# Patient Record
Sex: FEMALE | Race: BLACK OR AFRICAN AMERICAN | NOT HISPANIC OR LATINO | URBAN - METROPOLITAN AREA
[De-identification: names, ages, dates, MRNs, and addresses within clinical notes are randomized per-mention and may not be internally consistent; named-entity substitution may affect disease eponyms.]

---

## 2019-01-19 ENCOUNTER — INPATIENT (INPATIENT)
Facility: HOSPITAL | Age: 54
LOS: 25 days | Discharge: NURSING, REHAB OR LONG-TERM CARE | DRG: 674 | End: 2019-02-14
Attending: INTERNAL MEDICINE | Admitting: INTERNAL MEDICINE
Payer: MEDICARE

## 2019-01-19 VITALS
HEART RATE: 72 BPM | OXYGEN SATURATION: 99 % | DIASTOLIC BLOOD PRESSURE: 70 MMHG | HEIGHT: 63 IN | TEMPERATURE: 97 F | SYSTOLIC BLOOD PRESSURE: 121 MMHG | WEIGHT: 154.98 LBS | RESPIRATION RATE: 18 BRPM

## 2019-01-19 DIAGNOSIS — N17.9 ACUTE KIDNEY FAILURE, UNSPECIFIED: ICD-10-CM

## 2019-01-19 LAB
ALBUMIN SERPL ELPH-MCNC: 4 G/DL — SIGNIFICANT CHANGE UP (ref 3.5–5)
ALP SERPL-CCNC: 110 U/L — SIGNIFICANT CHANGE UP (ref 40–120)
ALT FLD-CCNC: 28 U/L DA — SIGNIFICANT CHANGE UP (ref 10–60)
ANION GAP SERPL CALC-SCNC: 13 MMOL/L — SIGNIFICANT CHANGE UP (ref 5–17)
ANION GAP SERPL CALC-SCNC: 14 MMOL/L — SIGNIFICANT CHANGE UP (ref 5–17)
APPEARANCE UR: CLEAR — SIGNIFICANT CHANGE UP
AST SERPL-CCNC: 19 U/L — SIGNIFICANT CHANGE UP (ref 10–40)
BACTERIA # UR AUTO: ABNORMAL /HPF
BASOPHILS # BLD AUTO: 0.1 K/UL — SIGNIFICANT CHANGE UP (ref 0–0.2)
BASOPHILS NFR BLD AUTO: 0.9 % — SIGNIFICANT CHANGE UP (ref 0–2)
BILIRUB SERPL-MCNC: 0.3 MG/DL — SIGNIFICANT CHANGE UP (ref 0.2–1.2)
BILIRUB UR-MCNC: NEGATIVE — SIGNIFICANT CHANGE UP
BUN SERPL-MCNC: 58 MG/DL — HIGH (ref 7–18)
BUN SERPL-MCNC: 59 MG/DL — HIGH (ref 7–18)
CALCIUM SERPL-MCNC: 8.9 MG/DL — SIGNIFICANT CHANGE UP (ref 8.4–10.5)
CALCIUM SERPL-MCNC: 9 MG/DL — SIGNIFICANT CHANGE UP (ref 8.4–10.5)
CHLORIDE SERPL-SCNC: 110 MMOL/L — HIGH (ref 96–108)
CHLORIDE SERPL-SCNC: 111 MMOL/L — HIGH (ref 96–108)
CO2 SERPL-SCNC: 15 MMOL/L — LOW (ref 22–31)
CO2 SERPL-SCNC: 18 MMOL/L — LOW (ref 22–31)
COLOR SPEC: YELLOW — SIGNIFICANT CHANGE UP
CREAT ?TM UR-MCNC: 60 MG/DL — SIGNIFICANT CHANGE UP
CREAT SERPL-MCNC: 7.74 MG/DL — HIGH (ref 0.5–1.3)
CREAT SERPL-MCNC: 7.93 MG/DL — HIGH (ref 0.5–1.3)
DIFF PNL FLD: ABNORMAL
EOSINOPHIL # BLD AUTO: 0.1 K/UL — SIGNIFICANT CHANGE UP (ref 0–0.5)
EOSINOPHIL NFR BLD AUTO: 0.9 % — SIGNIFICANT CHANGE UP (ref 0–6)
EPI CELLS # UR: SIGNIFICANT CHANGE UP /HPF
GLUCOSE SERPL-MCNC: 244 MG/DL — HIGH (ref 70–99)
GLUCOSE SERPL-MCNC: 97 MG/DL — SIGNIFICANT CHANGE UP (ref 70–99)
GLUCOSE UR QL: NEGATIVE — SIGNIFICANT CHANGE UP
HCG SERPL-ACNC: <1 MIU/ML — SIGNIFICANT CHANGE UP
HCG UR QL: NEGATIVE — SIGNIFICANT CHANGE UP
HCT VFR BLD CALC: 27.4 % — LOW (ref 34.5–45)
HGB BLD-MCNC: 8.8 G/DL — LOW (ref 11.5–15.5)
HYALINE CASTS # UR AUTO: ABNORMAL /LPF
IRON SATN MFR SERPL: 22 % — SIGNIFICANT CHANGE UP (ref 15–50)
IRON SATN MFR SERPL: 66 UG/DL — SIGNIFICANT CHANGE UP (ref 40–150)
KETONES UR-MCNC: NEGATIVE — SIGNIFICANT CHANGE UP
LDH SERPL L TO P-CCNC: 394 U/L — HIGH (ref 120–225)
LEUKOCYTE ESTERASE UR-ACNC: ABNORMAL
LYMPHOCYTES # BLD AUTO: 1.2 K/UL — SIGNIFICANT CHANGE UP (ref 1–3.3)
LYMPHOCYTES # BLD AUTO: 14.6 % — SIGNIFICANT CHANGE UP (ref 13–44)
MAGNESIUM SERPL-MCNC: 1.4 MG/DL — SIGNIFICANT CHANGE UP (ref 1.6–2.6)
MCHC RBC-ENTMCNC: 27.6 PG — SIGNIFICANT CHANGE UP (ref 27–34)
MCHC RBC-ENTMCNC: 32 GM/DL — SIGNIFICANT CHANGE UP (ref 32–36)
MCV RBC AUTO: 86.1 FL — SIGNIFICANT CHANGE UP (ref 80–100)
MONOCYTES # BLD AUTO: 0.5 K/UL — SIGNIFICANT CHANGE UP (ref 0–0.9)
MONOCYTES NFR BLD AUTO: 6.2 % — SIGNIFICANT CHANGE UP (ref 2–14)
NEUTROPHILS # BLD AUTO: 6.3 K/UL — SIGNIFICANT CHANGE UP (ref 1.8–7.4)
NEUTROPHILS NFR BLD AUTO: 77.4 % — HIGH (ref 43–77)
NITRITE UR-MCNC: NEGATIVE — SIGNIFICANT CHANGE UP
OSMOLALITY UR: 284 MOS/KG — SIGNIFICANT CHANGE UP (ref 50–1200)
PH UR: 5 — SIGNIFICANT CHANGE UP (ref 5–8)
PLATELET # BLD AUTO: 265 K/UL — SIGNIFICANT CHANGE UP (ref 150–400)
POTASSIUM SERPL-MCNC: 4.8 MMOL/L — SIGNIFICANT CHANGE UP (ref 3.5–5.3)
POTASSIUM SERPL-MCNC: 5.5 MMOL/L — HIGH (ref 3.5–5.3)
POTASSIUM SERPL-SCNC: 4.8 MMOL/L — SIGNIFICANT CHANGE UP (ref 3.5–5.3)
POTASSIUM SERPL-SCNC: 5.5 MMOL/L — HIGH (ref 3.5–5.3)
PROT ?TM UR-MCNC: 51 MG/DL — HIGH (ref 0–12)
PROT SERPL-MCNC: 8.3 G/DL — SIGNIFICANT CHANGE UP (ref 6–8.3)
PROT UR-MCNC: 30 MG/DL
RBC # BLD: 3.18 M/UL — LOW (ref 3.8–5.2)
RBC # FLD: 11.7 % — SIGNIFICANT CHANGE UP (ref 10.3–14.5)
RBC CASTS # UR COMP ASSIST: ABNORMAL /HPF (ref 0–2)
SODIUM SERPL-SCNC: 140 MMOL/L — SIGNIFICANT CHANGE UP (ref 135–145)
SODIUM SERPL-SCNC: 141 MMOL/L — SIGNIFICANT CHANGE UP (ref 135–145)
SODIUM UR-SCNC: 52 MMOL/L — SIGNIFICANT CHANGE UP (ref 40–220)
SP GR SPEC: 1.01 — SIGNIFICANT CHANGE UP (ref 1.01–1.02)
TIBC SERPL-MCNC: 300 UG/DL — SIGNIFICANT CHANGE UP (ref 250–450)
TROPONIN I SERPL-MCNC: <0.015 NG/ML — SIGNIFICANT CHANGE UP (ref 0–0.04)
TSH SERPL-MCNC: 0.76 UU/ML — SIGNIFICANT CHANGE UP (ref 0.34–4.82)
UIBC SERPL-MCNC: 234 UG/DL — SIGNIFICANT CHANGE UP (ref 110–370)
URATE SERPL-MCNC: 5.2 MG/DL — SIGNIFICANT CHANGE UP (ref 2.5–7)
UROBILINOGEN FLD QL: NEGATIVE — SIGNIFICANT CHANGE UP
WBC # BLD: 8.2 K/UL — SIGNIFICANT CHANGE UP (ref 3.8–10.5)
WBC # FLD AUTO: 8.2 K/UL — SIGNIFICANT CHANGE UP (ref 3.8–10.5)
WBC UR QL: SIGNIFICANT CHANGE UP /HPF (ref 0–5)

## 2019-01-19 PROCEDURE — 71045 X-RAY EXAM CHEST 1 VIEW: CPT | Mod: 26

## 2019-01-19 PROCEDURE — 99285 EMERGENCY DEPT VISIT HI MDM: CPT

## 2019-01-19 RX ORDER — DEXTROSE 50 % IN WATER 50 %
50 SYRINGE (ML) INTRAVENOUS ONCE
Qty: 0 | Refills: 0 | Status: COMPLETED | OUTPATIENT
Start: 2019-01-19 | End: 2019-01-19

## 2019-01-19 RX ORDER — SODIUM CHLORIDE 9 MG/ML
3 INJECTION INTRAMUSCULAR; INTRAVENOUS; SUBCUTANEOUS ONCE
Qty: 0 | Refills: 0 | Status: COMPLETED | OUTPATIENT
Start: 2019-01-19 | End: 2019-01-19

## 2019-01-19 RX ORDER — SODIUM POLYSTYRENE SULFONATE 4.1 MEQ/G
30 POWDER, FOR SUSPENSION ORAL ONCE
Qty: 0 | Refills: 0 | Status: COMPLETED | OUTPATIENT
Start: 2019-01-19 | End: 2019-01-19

## 2019-01-19 RX ORDER — IPRATROPIUM/ALBUTEROL SULFATE 18-103MCG
3 AEROSOL WITH ADAPTER (GRAM) INHALATION ONCE
Qty: 0 | Refills: 0 | Status: COMPLETED | OUTPATIENT
Start: 2019-01-19 | End: 2019-01-19

## 2019-01-19 RX ORDER — INSULIN LISPRO 100/ML
VIAL (ML) SUBCUTANEOUS
Qty: 0 | Refills: 0 | Status: DISCONTINUED | OUTPATIENT
Start: 2019-01-19 | End: 2019-02-14

## 2019-01-19 RX ORDER — INSULIN HUMAN 100 [IU]/ML
10 INJECTION, SOLUTION SUBCUTANEOUS ONCE
Qty: 0 | Refills: 0 | Status: COMPLETED | OUTPATIENT
Start: 2019-01-19 | End: 2019-01-19

## 2019-01-19 RX ORDER — SODIUM CHLORIDE 9 MG/ML
1000 INJECTION INTRAMUSCULAR; INTRAVENOUS; SUBCUTANEOUS ONCE
Qty: 0 | Refills: 0 | Status: COMPLETED | OUTPATIENT
Start: 2019-01-19 | End: 2019-01-19

## 2019-01-19 RX ADMIN — SODIUM CHLORIDE 3 MILLILITER(S): 9 INJECTION INTRAMUSCULAR; INTRAVENOUS; SUBCUTANEOUS at 19:13

## 2019-01-19 NOTE — H&P ADULT - PROBLEM SELECTOR PLAN 4
On Glucophage at home , will hold   Start hss   f/u a1c On Glucophage at home , will hold   Start hss   f/u a1c  PATIENT DOES NOT KNOW DOSES OF MEDS ,  PRIMARY TEAM TO OBTAIN On Glucophage and amyryl at home , will hold   Start hss   f/u a1c  PATIENT DOES NOT KNOW DOSES OF MEDS ,  PRIMARY TEAM TO OBTAIN

## 2019-01-19 NOTE — ED ADULT TRIAGE NOTE - CHIEF COMPLAINT QUOTE
"lethargic, no appetite" as per nursing home paper "she is not opening her eyes, she said she is hot and sweating" patient alert awake and responsive at triage.

## 2019-01-19 NOTE — H&P ADULT - HISTORY OF PRESENT ILLNESS
53 female with PMH of DM , Schizophrenia , HLD , comes in with complaint of vomiting. Patient was BIB EMS to the ED for from assisted living with the report that patient was difficult to arouse, hot and sweating. In ED, patient states that she felt like sleeping after having her medication and did not want to wake up. Patient denies feeling sweaty or hot or losing consciousness. Patient has no complaints a this time. Patient denies recent illness, pain, headache, shortness of breath, dizziness or any other symptoms. Denies 53 female with PMH of DM , Schizophrenia , HLD , comes in with complaint of vomiting. Patient was BIB EMS to the ED for from assisted living with the report that patient was difficult to arouse, hot and sweating. In ED, patient states that she felt like sleeping after having her medication and did not want to wake up for 3 hours. Reports having nose bleeding and episode of NBNP vomiting after which she fell asleep for 3 hours. Patient denies feeling sweaty or hot or losing consciousness. Patient has no complaints a this time. Patient denies recent illness, pain, headache, shortness of breath, dizziness or any other symptoms. Per EMS note and Assisted living papers , patient  has been noted to be slightly lethargic since few months now with poor po intake.     In ED, BP : 121/70 mm hg, HR: 72 , Temp : 98.4 F  Cbc shows hb of 8.8 , with normal MCV  Bmp shows elevated BUN/ Creatinine (58/7.74)  T1 negative   EKG shows :   CXR shows mild increased interstitial markings

## 2019-01-19 NOTE — H&P ADULT - PROBLEM SELECTOR PLAN 8
IMPROVE VTE Individual Risk Assessment          RISK                                                          Points  [  ] Previous VTE                                                3  [  ] Thrombophilia                                             2  [  ] Lower limb paralysis                                   2        (unable to hold up >15 seconds)    [  ] Current Cancer                                             2         (within 6 months)  [ x ] Immobilization > 24 hrs                              1  [  ] ICU/CCU stay > 24 hours                             1  [  ] Age > 60                                                         1    IMPROVE VTE Score: 1  No indication of dvt ppx

## 2019-01-19 NOTE — ED PROVIDER NOTE - OBJECTIVE STATEMENT
52 y/o F pt with a PMHx of CAD, bipolar disorder, DM, schizophrenia, BIB EMS to the ED for from assisted living with the report that patient was difficult to arouse, hot and sweating. In ED, patient states that she felt like sleeping after having her medication and did not want to wake up. Patient denies feeling sweaty or hot or losing consciousness. Patient has no complaints a this time. Patient denies recent illness, pain, headache, shortness of breath, dizziness or any other symptoms. NKDA.

## 2019-01-19 NOTE — ED PROVIDER NOTE - PSYCHIATRIC, MLM
Alert and oriented to person, place, time/situation. normal mood and affect. no apparent risk to self or others. No suicidal ideations.

## 2019-01-19 NOTE — ED ADULT NURSE NOTE - NSIMPLEMENTINTERV_GEN_ALL_ED
Implemented All Universal Safety Interventions:  [place] to call system. Call bell, personal items and telephone within reach. Instruct patient to call for assistance. Room bathroom lighting operational. Non-slip footwear when patient is off stretcher. Physically safe environment: no spills, clutter or unnecessary equipment. Stretcher in lowest position, wheels locked, appropriate side rails in place.

## 2019-01-19 NOTE — H&P ADULT - PROBLEM SELECTOR PLAN 6
IMPROVE VTE Individual Risk Assessment          RISK                                                          Points  [  ] Previous VTE                                                3  [  ] Thrombophilia                                             2  [  ] Lower limb paralysis                                   2        (unable to hold up >15 seconds)    [  ] Current Cancer                                             2         (within 6 months)  [ x ] Immobilization > 24 hrs                              1  [  ] ICU/CCU stay > 24 hours                             1  [  ] Age > 60                                                         1    IMPROVE VTE Score: 1  No indication of dvt ppx c/w zocor   obtain lipid profile

## 2019-01-19 NOTE — H&P ADULT - ASSESSMENT
53 female with PMH of DM , Schizophrenia , HLD , comes in with complaint of vomiting. Patient was BIB EMS to the ED for from assisted living with the report that patient was difficult to arouse, hot and sweating. In ED, patient states that she felt like sleeping after having her medication and did not want to wake up for 3 hours. Reports having nose bleeding and episode of NBNP vomiting after which she fell asleep for 3 hours. Patient denies feeling sweaty or hot or losing consciousness. Patient has no complaints a this time. Patient denies recent illness, pain, headache, shortness of breath, dizziness or any other symptoms. Per EMS note and Assisted living papers , patient  has been noted to be slightly lethargic since few months now with poor po intake. No history of renal failure.     In ED, BP : 121/70 mm hg, HR: 72 , Temp : 98.4 F  Cbc shows hb of 8.8 , with normal MCV  Bmp shows elevated BUN/ Creatinine (58/7.74) with K of 5.5   T1 negative   EKG shows :   CXR shows mild increased interstitial markings     Will admit to telemetry for New onset Renal Failure.

## 2019-01-19 NOTE — H&P ADULT - PROBLEM SELECTOR PLAN 2
Hyperkalemia on labs , K of 5.5 , likely from kidney failure   s/p cocktail , d50 , insulin , duoneb , kayxelate   Would repeat BMP in couple hours   Monitor on tele   No EKG changes

## 2019-01-19 NOTE — ED PROVIDER NOTE - MEDICAL DECISION MAKING DETAILS
52 y/o F pt presents with episode of lethargy per assisted living. Patient at baseline. Will do labs, EKG and discuss with Dr. Mendez.

## 2019-01-19 NOTE — H&P ADULT - PROBLEM SELECTOR PLAN 1
Comes in with Acute Kidney failure , no known history of CKD  Could have underlying CKD given h/o DM  No abdo distension on exam , can pass urine   Urine lytes s/o post obstructive picture   Patient refusing Yoder   Would obtain CT A/P non contrast   s/p 1 L Fluid bolus , euvolemic on exam however   CXR shows mild interstitial lung markings , but no sob on exam   Would obtain Pro-bnp , VBG   Monitor I&Os  Avoid nephrotoxins   Nephro Dr. Herzog

## 2019-01-20 DIAGNOSIS — E11.9 TYPE 2 DIABETES MELLITUS WITHOUT COMPLICATIONS: ICD-10-CM

## 2019-01-20 DIAGNOSIS — D64.9 ANEMIA, UNSPECIFIED: ICD-10-CM

## 2019-01-20 DIAGNOSIS — E03.9 HYPOTHYROIDISM, UNSPECIFIED: ICD-10-CM

## 2019-01-20 DIAGNOSIS — N17.9 ACUTE KIDNEY FAILURE, UNSPECIFIED: ICD-10-CM

## 2019-01-20 DIAGNOSIS — E87.5 HYPERKALEMIA: ICD-10-CM

## 2019-01-20 DIAGNOSIS — F20.9 SCHIZOPHRENIA, UNSPECIFIED: ICD-10-CM

## 2019-01-20 DIAGNOSIS — E78.5 HYPERLIPIDEMIA, UNSPECIFIED: ICD-10-CM

## 2019-01-20 DIAGNOSIS — Z29.9 ENCOUNTER FOR PROPHYLACTIC MEASURES, UNSPECIFIED: ICD-10-CM

## 2019-01-20 LAB
ALBUMIN SERPL ELPH-MCNC: 3.2 G/DL — LOW (ref 3.5–5)
ALP SERPL-CCNC: 99 U/L — SIGNIFICANT CHANGE UP (ref 40–120)
ALT FLD-CCNC: 22 U/L DA — SIGNIFICANT CHANGE UP (ref 10–60)
ANION GAP SERPL CALC-SCNC: 10 MMOL/L — SIGNIFICANT CHANGE UP (ref 5–17)
ANION GAP SERPL CALC-SCNC: 11 MMOL/L — SIGNIFICANT CHANGE UP (ref 5–17)
ANION GAP SERPL CALC-SCNC: 9 MMOL/L — SIGNIFICANT CHANGE UP (ref 5–17)
AST SERPL-CCNC: 12 U/L — SIGNIFICANT CHANGE UP (ref 10–40)
BASE EXCESS BLDV CALC-SCNC: -7.4 MMOL/L — LOW (ref -2–2)
BASOPHILS # BLD AUTO: 0.1 K/UL — SIGNIFICANT CHANGE UP (ref 0–0.2)
BASOPHILS NFR BLD AUTO: 1.5 % — SIGNIFICANT CHANGE UP (ref 0–2)
BILIRUB SERPL-MCNC: 0.3 MG/DL — SIGNIFICANT CHANGE UP (ref 0.2–1.2)
BLOOD GAS COMMENTS, VENOUS: SIGNIFICANT CHANGE UP
BUN SERPL-MCNC: 58 MG/DL — HIGH (ref 7–18)
BUN SERPL-MCNC: 60 MG/DL — HIGH (ref 7–18)
BUN SERPL-MCNC: 63 MG/DL — HIGH (ref 7–18)
CALCIUM SERPL-MCNC: 8.1 MG/DL — LOW (ref 8.4–10.5)
CALCIUM SERPL-MCNC: 8.4 MG/DL — SIGNIFICANT CHANGE UP (ref 8.4–10.5)
CALCIUM SERPL-MCNC: 8.5 MG/DL — SIGNIFICANT CHANGE UP (ref 8.4–10.5)
CHLORIDE SERPL-SCNC: 112 MMOL/L — HIGH (ref 96–108)
CHLORIDE SERPL-SCNC: 116 MMOL/L — HIGH (ref 96–108)
CHLORIDE SERPL-SCNC: 116 MMOL/L — HIGH (ref 96–108)
CHOLEST SERPL-MCNC: 94 MG/DL — SIGNIFICANT CHANGE UP (ref 10–199)
CO2 SERPL-SCNC: 19 MMOL/L — LOW (ref 22–31)
CO2 SERPL-SCNC: 20 MMOL/L — LOW (ref 22–31)
CO2 SERPL-SCNC: 22 MMOL/L — SIGNIFICANT CHANGE UP (ref 22–31)
CREAT SERPL-MCNC: 7.62 MG/DL — HIGH (ref 0.5–1.3)
CREAT SERPL-MCNC: 7.74 MG/DL — HIGH (ref 0.5–1.3)
CREAT SERPL-MCNC: 7.74 MG/DL — HIGH (ref 0.5–1.3)
EOSINOPHIL # BLD AUTO: 0.2 K/UL — SIGNIFICANT CHANGE UP (ref 0–0.5)
EOSINOPHIL NFR BLD AUTO: 3.1 % — SIGNIFICANT CHANGE UP (ref 0–6)
FERRITIN SERPL-MCNC: 140 NG/ML — SIGNIFICANT CHANGE UP (ref 15–150)
GLUCOSE BLDC GLUCOMTR-MCNC: 188 MG/DL — HIGH (ref 70–99)
GLUCOSE BLDC GLUCOMTR-MCNC: 189 MG/DL — HIGH (ref 70–99)
GLUCOSE BLDC GLUCOMTR-MCNC: 235 MG/DL — HIGH (ref 70–99)
GLUCOSE BLDC GLUCOMTR-MCNC: 468 MG/DL — CRITICAL HIGH (ref 70–99)
GLUCOSE BLDC GLUCOMTR-MCNC: 60 MG/DL — LOW (ref 70–99)
GLUCOSE SERPL-MCNC: 156 MG/DL — HIGH (ref 70–99)
GLUCOSE SERPL-MCNC: 49 MG/DL — LOW (ref 70–99)
GLUCOSE SERPL-MCNC: 50 MG/DL — LOW (ref 70–99)
HAPTOGLOB SERPL-MCNC: 151 MG/DL — SIGNIFICANT CHANGE UP (ref 34–200)
HBA1C BLD-MCNC: 6.8 % — HIGH (ref 4–5.6)
HCO3 BLDV-SCNC: 18 MMOL/L — LOW (ref 21–29)
HCT VFR BLD CALC: 24 % — LOW (ref 34.5–45)
HDLC SERPL-MCNC: 33 MG/DL — LOW
HGB BLD-MCNC: 7.2 G/DL — LOW (ref 11.5–15.5)
HOROWITZ INDEX BLDV+IHG-RTO: 21 — SIGNIFICANT CHANGE UP
LIPID PNL WITH DIRECT LDL SERPL: 38 MG/DL — SIGNIFICANT CHANGE UP
LYMPHOCYTES # BLD AUTO: 1.8 K/UL — SIGNIFICANT CHANGE UP (ref 1–3.3)
LYMPHOCYTES # BLD AUTO: 25.9 % — SIGNIFICANT CHANGE UP (ref 13–44)
MAGNESIUM SERPL-MCNC: 1.6 MG/DL — SIGNIFICANT CHANGE UP (ref 1.6–2.6)
MCHC RBC-ENTMCNC: 26.4 PG — LOW (ref 27–34)
MCHC RBC-ENTMCNC: 30.1 GM/DL — LOW (ref 32–36)
MCV RBC AUTO: 87.8 FL — SIGNIFICANT CHANGE UP (ref 80–100)
MONOCYTES # BLD AUTO: 0.6 K/UL — SIGNIFICANT CHANGE UP (ref 0–0.9)
MONOCYTES NFR BLD AUTO: 8.4 % — SIGNIFICANT CHANGE UP (ref 2–14)
NEUTROPHILS # BLD AUTO: 4.1 K/UL — SIGNIFICANT CHANGE UP (ref 1.8–7.4)
NEUTROPHILS NFR BLD AUTO: 61.2 % — SIGNIFICANT CHANGE UP (ref 43–77)
NT-PROBNP SERPL-SCNC: 97 PG/ML — SIGNIFICANT CHANGE UP (ref 0–125)
PCO2 BLDV: 36 MMHG — SIGNIFICANT CHANGE UP (ref 35–50)
PH BLDV: 7.31 — LOW (ref 7.35–7.45)
PHOSPHATE SERPL-MCNC: 4 MG/DL — SIGNIFICANT CHANGE UP (ref 2.5–4.5)
PLATELET # BLD AUTO: 220 K/UL — SIGNIFICANT CHANGE UP (ref 150–400)
PO2 BLDV: 72 MMHG — HIGH (ref 25–45)
POTASSIUM SERPL-MCNC: 4.5 MMOL/L — SIGNIFICANT CHANGE UP (ref 3.5–5.3)
POTASSIUM SERPL-MCNC: 4.6 MMOL/L — SIGNIFICANT CHANGE UP (ref 3.5–5.3)
POTASSIUM SERPL-MCNC: 4.7 MMOL/L — SIGNIFICANT CHANGE UP (ref 3.5–5.3)
POTASSIUM SERPL-SCNC: 4.5 MMOL/L — SIGNIFICANT CHANGE UP (ref 3.5–5.3)
POTASSIUM SERPL-SCNC: 4.6 MMOL/L — SIGNIFICANT CHANGE UP (ref 3.5–5.3)
POTASSIUM SERPL-SCNC: 4.7 MMOL/L — SIGNIFICANT CHANGE UP (ref 3.5–5.3)
PROT SERPL-MCNC: 7.2 G/DL — SIGNIFICANT CHANGE UP (ref 6–8.3)
RBC # BLD: 2.74 M/UL — LOW (ref 3.8–5.2)
RBC # FLD: 11.8 % — SIGNIFICANT CHANGE UP (ref 10.3–14.5)
SAO2 % BLDV: 94 % — HIGH (ref 67–88)
SODIUM SERPL-SCNC: 143 MMOL/L — SIGNIFICANT CHANGE UP (ref 135–145)
SODIUM SERPL-SCNC: 146 MMOL/L — HIGH (ref 135–145)
SODIUM SERPL-SCNC: 146 MMOL/L — HIGH (ref 135–145)
TOTAL CHOLESTEROL/HDL RATIO MEASUREMENT: 2.8 RATIO — LOW (ref 3.3–7.1)
TRIGL SERPL-MCNC: 114 MG/DL — SIGNIFICANT CHANGE UP (ref 10–149)
TSH SERPL-MCNC: 1.48 UU/ML — SIGNIFICANT CHANGE UP (ref 0.34–4.82)
WBC # BLD: 6.8 K/UL — SIGNIFICANT CHANGE UP (ref 3.8–10.5)
WBC # FLD AUTO: 6.8 K/UL — SIGNIFICANT CHANGE UP (ref 3.8–10.5)

## 2019-01-20 PROCEDURE — 74176 CT ABD & PELVIS W/O CONTRAST: CPT | Mod: 26

## 2019-01-20 RX ORDER — DEXTROSE 50 % IN WATER 50 %
50 SYRINGE (ML) INTRAVENOUS ONCE
Qty: 0 | Refills: 0 | Status: COMPLETED | OUTPATIENT
Start: 2019-01-20 | End: 2019-01-20

## 2019-01-20 RX ORDER — DEXTROSE 50 % IN WATER 50 %
25 SYRINGE (ML) INTRAVENOUS ONCE
Qty: 0 | Refills: 0 | Status: DISCONTINUED | OUTPATIENT
Start: 2019-01-20 | End: 2019-02-02

## 2019-01-20 RX ORDER — SIMVASTATIN 20 MG/1
20 TABLET, FILM COATED ORAL AT BEDTIME
Qty: 0 | Refills: 0 | Status: DISCONTINUED | OUTPATIENT
Start: 2019-01-20 | End: 2019-02-14

## 2019-01-20 RX ORDER — GLUCAGON INJECTION, SOLUTION 0.5 MG/.1ML
1 INJECTION, SOLUTION SUBCUTANEOUS ONCE
Qty: 0 | Refills: 0 | Status: DISCONTINUED | OUTPATIENT
Start: 2019-01-20 | End: 2019-02-02

## 2019-01-20 RX ORDER — SODIUM CHLORIDE 9 MG/ML
1000 INJECTION, SOLUTION INTRAVENOUS
Qty: 0 | Refills: 0 | Status: DISCONTINUED | OUTPATIENT
Start: 2019-01-20 | End: 2019-01-29

## 2019-01-20 RX ORDER — DEXTROSE 50 % IN WATER 50 %
12.5 SYRINGE (ML) INTRAVENOUS ONCE
Qty: 0 | Refills: 0 | Status: DISCONTINUED | OUTPATIENT
Start: 2019-01-20 | End: 2019-02-02

## 2019-01-20 RX ORDER — BENZTROPINE MESYLATE 1 MG
0.5 TABLET ORAL
Qty: 0 | Refills: 0 | Status: DISCONTINUED | OUTPATIENT
Start: 2019-01-20 | End: 2019-02-14

## 2019-01-20 RX ORDER — LEVOTHYROXINE SODIUM 125 MCG
50 TABLET ORAL DAILY
Qty: 0 | Refills: 0 | Status: DISCONTINUED | OUTPATIENT
Start: 2019-01-20 | End: 2019-01-22

## 2019-01-20 RX ORDER — SODIUM CHLORIDE 9 MG/ML
1000 INJECTION, SOLUTION INTRAVENOUS
Qty: 0 | Refills: 0 | Status: DISCONTINUED | OUTPATIENT
Start: 2019-01-20 | End: 2019-01-20

## 2019-01-20 RX ORDER — SODIUM BICARBONATE 1 MEQ/ML
50 SYRINGE (ML) INTRAVENOUS ONCE
Qty: 0 | Refills: 0 | Status: COMPLETED | OUTPATIENT
Start: 2019-01-20 | End: 2019-01-20

## 2019-01-20 RX ORDER — SODIUM CHLORIDE 9 MG/ML
1000 INJECTION INTRAMUSCULAR; INTRAVENOUS; SUBCUTANEOUS
Qty: 0 | Refills: 0 | Status: DISCONTINUED | OUTPATIENT
Start: 2019-01-20 | End: 2019-01-20

## 2019-01-20 RX ORDER — ASPIRIN/CALCIUM CARB/MAGNESIUM 324 MG
81 TABLET ORAL DAILY
Qty: 0 | Refills: 0 | Status: DISCONTINUED | OUTPATIENT
Start: 2019-01-20 | End: 2019-01-29

## 2019-01-20 RX ORDER — DEXTROSE 50 % IN WATER 50 %
15 SYRINGE (ML) INTRAVENOUS ONCE
Qty: 0 | Refills: 0 | Status: DISCONTINUED | OUTPATIENT
Start: 2019-01-20 | End: 2019-02-02

## 2019-01-20 RX ORDER — SODIUM CHLORIDE 9 MG/ML
1000 INJECTION, SOLUTION INTRAVENOUS
Qty: 0 | Refills: 0 | Status: DISCONTINUED | OUTPATIENT
Start: 2019-01-20 | End: 2019-01-22

## 2019-01-20 RX ORDER — AMLODIPINE BESYLATE 2.5 MG/1
5 TABLET ORAL DAILY
Qty: 0 | Refills: 0 | Status: DISCONTINUED | OUTPATIENT
Start: 2019-01-20 | End: 2019-02-11

## 2019-01-20 RX ADMIN — Medication 0.5 MILLIGRAM(S): at 06:39

## 2019-01-20 RX ADMIN — Medication 81 MILLIGRAM(S): at 13:50

## 2019-01-20 RX ADMIN — Medication 0.5 MILLIGRAM(S): at 18:23

## 2019-01-20 RX ADMIN — Medication 2: at 17:08

## 2019-01-20 RX ADMIN — AMLODIPINE BESYLATE 5 MILLIGRAM(S): 2.5 TABLET ORAL at 06:39

## 2019-01-20 RX ADMIN — SODIUM CHLORIDE 2000 MILLILITER(S): 9 INJECTION INTRAMUSCULAR; INTRAVENOUS; SUBCUTANEOUS at 00:29

## 2019-01-20 RX ADMIN — Medication 50 MILLILITER(S): at 00:29

## 2019-01-20 RX ADMIN — SODIUM CHLORIDE 80 MILLILITER(S): 9 INJECTION INTRAMUSCULAR; INTRAVENOUS; SUBCUTANEOUS at 06:41

## 2019-01-20 RX ADMIN — Medication 1: at 22:41

## 2019-01-20 RX ADMIN — Medication 50 MICROGRAM(S): at 06:39

## 2019-01-20 RX ADMIN — SIMVASTATIN 20 MILLIGRAM(S): 20 TABLET, FILM COATED ORAL at 22:42

## 2019-01-20 RX ADMIN — Medication 3 MILLILITER(S): at 00:28

## 2019-01-20 RX ADMIN — Medication 50 MILLIEQUIVALENT(S): at 06:40

## 2019-01-20 RX ADMIN — SODIUM POLYSTYRENE SULFONATE 30 GRAM(S): 4.1 POWDER, FOR SUSPENSION ORAL at 00:29

## 2019-01-20 RX ADMIN — Medication 50 MILLILITER(S): at 08:15

## 2019-01-20 RX ADMIN — SODIUM CHLORIDE 80 MILLILITER(S): 9 INJECTION, SOLUTION INTRAVENOUS at 11:50

## 2019-01-20 RX ADMIN — INSULIN HUMAN 10 UNIT(S): 100 INJECTION, SOLUTION SUBCUTANEOUS at 00:28

## 2019-01-20 NOTE — CONSULT NOTE ADULT - SUBJECTIVE AND OBJECTIVE BOX
HPI:  53 female with PMH of DM , Schizophrenia , HLD , comes in with complaint of vomiting. Patient was BIB EMS to the ED for from assisted living with the report that patient was difficult to arouse, hot and sweating. In ED, patient states that she felt like sleeping after having her medication and did not want to wake up for 3 hours. Reports having nose bleeding and episode of NBNP vomiting after which she fell asleep for 3 hours. Patient denies feeling sweaty or hot or losing consciousness. Patient has no complaints a this time. Patient denies recent illness, pain, headache, shortness of breath, dizziness or any other symptoms. Per EMS note and Assisted living papers , patient  has been noted to be slightly lethargic since few months now with poor po intake.   There is no previous history or kidney disease, blood in urine or difficulty passing urine.    PMH:   CAD (coronary artery disease)  Bipolar disorder  Schizophrenia  Diabetes      PSH:   No significant past surgical history      FAMILY HISTORY:  Family history of diabetes mellitus (Father)      Social History:  non-smoker/ non-alcoholic     Home Meds:  MEDICATIONS  (STANDING):  amLODIPine   Tablet 5 milliGRAM(s) Oral daily  aspirin enteric coated 81 milliGRAM(s) Oral daily  benztropine 0.5 milliGRAM(s) Oral two times a day  dextrose 5%. 1000 milliLiter(s) (50 mL/Hr) IV Continuous <Continuous>  dextrose 50% Injectable 12.5 Gram(s) IV Push once  dextrose 50% Injectable 25 Gram(s) IV Push once  dextrose 50% Injectable 25 Gram(s) IV Push once  insulin lispro (HumaLOG) corrective regimen sliding scale   SubCutaneous Before meals and at bedtime  levothyroxine 50 MICROGram(s) Oral daily  simvastatin 20 milliGRAM(s) Oral at bedtime  sodium chloride 0.45%. 1000 milliLiter(s) (80 mL/Hr) IV Continuous <Continuous>    MEDICATIONS  (PRN):  dextrose 40% Gel 15 Gram(s) Oral once PRN Blood Glucose LESS THAN 70 milliGRAM(s)/deciLiter  glucagon  Injectable 1 milliGRAM(s) IntraMuscular once PRN Glucose <70 milliGRAM(s)/deciLiter      Allergies:  No Known Allergies    REVIEW OF SYSTEMS:    CONSTITUTIONAL: No fever or chills  EYES: No eye pain or discharge  ENMT: No throat pain  NECK: No pain or stiffness  RESPIRATORY: No cough. No shortness of breath  CARDIOVASCULAR: No chest pain, palpitations, dizziness, or leg swelling  GASTROINTESTINAL: Positive for vomiting  GENITOURINARY: No dysuria, frequency, hematuria, or incontinence  NEUROLOGICAL: No headaches  SKIN: No itching, burning, rashes    Vital Signs Last 24 Hrs  T(C): 36.8 (2019 16:10), Max: 37 (2019 00:19)  T(F): 98.3 (2019 16:10), Max: 98.6 (2019 00:19)  HR: 62 (2019 16:10) (61 - 79)  BP: 117/47 (2019 16:10) (117/47 - 157/61)  BP(mean): --  RR: 16 (2019 16:10) (16 - 19)  SpO2: 100% (2019 16:10) (97% - 100%)     @ 07:01  -   @ 16:54  --------------------------------------------------------  IN: 0 mL / OUT: 1500 mL / NET: -1500 mL    PHYSICAL EXAM:    GENERAL: NAD, well-nourished, well-developed  HEAD:  Atraumatic, Normocephalic  EYES: Sclera anicteric  ENMT: Moist mucous membranes  NECK: Supple, No JVD  NERVOUS SYSTEM:  Alert & Oriented X3  CHEST/LUNG: Clear   HEART: Regular rate and rhythm; No murmurs  ABDOMEN: Soft, Nontender, Nondistended; Bowel sounds present  EXTREMITIES:  no edema  SKIN: Normal turgor    LABS:                        7.2    6.8   )-----------( 220      ( 2019 06:43 )             24.0         143  |  112<H>  |  58<H>  ----------------------------<  156<H>  4.5   |  22  |  7.74<H>    Ca    8.1<L>      2019 11:07  Phos  4.0       Mg     1.6         TPro  7.2  /  Alb  3.2<L>  /  TBili  0.3  /  DBili  x   /  AST  12  /  ALT  22  /  AlkPhos  99  -20    Urinalysis Basic - ( 2019 19:16 )    Color: Yellow / Appearance: Clear / S.010 / pH: x  Gluc: x / Ketone: Negative  / Bili: Negative / Urobili: Negative   Blood: x / Protein: 30 mg/dL / Nitrite: Negative   Leuk Esterase: Trace / RBC: 2-5 /HPF / WBC 3-5 /HPF   Sq Epi: x / Non Sq Epi: Few /HPF / Bacteria: Few /HPF    ASSESSMENT AND PLAN:  1. Impaired renal function appears chronic, however there might be acute component due to volume depletion (vomiting)  2. R/O CKD due to Diabetic nephropathy  3. Anemia possible due to CKD  4. Hypoglycemia   Continue current IVF  Keep patient euvolemic and renal diet  Avoid Nephrtoxic Meds/ Agents such as NSAIDs, Gadolinium contrast, Phosphate containing enemas, etc..)  Adjust Medications according to eGFR  Obtain TSAT, PO4, PTH, spot urine for protein and creatinine, Renal US  Follow BMP, H/H  Many thanks HPI:  53 female with PMH of DM , Schizophrenia , HLD , comes in with complaint of vomiting. Patient was BIB EMS to the ED for from assisted living with the report that patient was difficult to arouse, hot and sweating. In ED, patient states that she felt like sleeping after having her medication and did not want to wake up for 3 hours. Reports having nose bleeding and episode of NBNP vomiting after which she fell asleep for 3 hours. Patient denies feeling sweaty or hot or losing consciousness. Patient has no complaints a this time. Patient denies recent illness, pain, headache, shortness of breath, dizziness or any other symptoms. Per EMS note and Assisted living papers , patient  has been noted to be slightly lethargic since few months now with poor po intake.   There is no previous history or kidney disease, blood in urine or difficulty passing urine.    PMH:   CAD (coronary artery disease)  Bipolar disorder  Schizophrenia  Diabetes      PSH:   No significant past surgical history      FAMILY HISTORY:  Family history of diabetes mellitus (Father)      Social History:  non-smoker/ non-alcoholic     Home Meds:  MEDICATIONS  (STANDING):  amLODIPine   Tablet 5 milliGRAM(s) Oral daily  aspirin enteric coated 81 milliGRAM(s) Oral daily  benztropine 0.5 milliGRAM(s) Oral two times a day  dextrose 5%. 1000 milliLiter(s) (50 mL/Hr) IV Continuous <Continuous>  dextrose 50% Injectable 12.5 Gram(s) IV Push once  dextrose 50% Injectable 25 Gram(s) IV Push once  dextrose 50% Injectable 25 Gram(s) IV Push once  insulin lispro (HumaLOG) corrective regimen sliding scale   SubCutaneous Before meals and at bedtime  levothyroxine 50 MICROGram(s) Oral daily  simvastatin 20 milliGRAM(s) Oral at bedtime  sodium chloride 0.45%. 1000 milliLiter(s) (80 mL/Hr) IV Continuous <Continuous>    MEDICATIONS  (PRN):  dextrose 40% Gel 15 Gram(s) Oral once PRN Blood Glucose LESS THAN 70 milliGRAM(s)/deciLiter  glucagon  Injectable 1 milliGRAM(s) IntraMuscular once PRN Glucose <70 milliGRAM(s)/deciLiter      Allergies:  No Known Allergies    REVIEW OF SYSTEMS:    CONSTITUTIONAL: No fever or chills  EYES: No eye pain or discharge  ENMT: No throat pain  NECK: No pain or stiffness  RESPIRATORY: No cough. No shortness of breath  CARDIOVASCULAR: No chest pain, palpitations, dizziness, or leg swelling  GASTROINTESTINAL: Positive for vomiting  GENITOURINARY: No dysuria, frequency, hematuria, or incontinence  NEUROLOGICAL: No headaches  SKIN: No itching, burning, rashes    Vital Signs Last 24 Hrs  T(C): 36.8 (2019 16:10), Max: 37 (2019 00:19)  T(F): 98.3 (2019 16:10), Max: 98.6 (2019 00:19)  HR: 62 (2019 16:10) (61 - 79)  BP: 117/47 (2019 16:10) (117/47 - 157/61)  BP(mean): --  RR: 16 (2019 16:10) (16 - 19)  SpO2: 100% (2019 16:10) (97% - 100%)     @ 07:01  -   @ 16:54  --------------------------------------------------------  IN: 0 mL / OUT: 1500 mL / NET: -1500 mL    PHYSICAL EXAM:    GENERAL: NAD, well-nourished, well-developed  HEAD:  Atraumatic, Normocephalic  EYES: Sclera anicteric  ENMT: Moist mucous membranes  NECK: Supple, No JVD  NERVOUS SYSTEM:  Alert & Oriented X3  CHEST/LUNG: Clear   HEART: Regular rate and rhythm; No murmurs  ABDOMEN: Soft, Nontender, Nondistended; Bowel sounds present  EXTREMITIES:  no edema  SKIN: Normal turgor    LABS:                        7.2    6.8   )-----------( 220      ( 2019 06:43 )             24.0         143  |  112<H>  |  58<H>  ----------------------------<  156<H>  4.5   |  22  |  7.74<H>    Ca    8.1<L>      2019 11:07  Phos  4.0       Mg     1.6         TPro  7.2  /  Alb  3.2<L>  /  TBili  0.3  /  DBili  x   /  AST  12  /  ALT  22  /  AlkPhos  99  -20    Urinalysis Basic - ( 2019 19:16 )    Color: Yellow / Appearance: Clear / S.010 / pH: x  Gluc: x / Ketone: Negative  / Bili: Negative / Urobili: Negative   Blood: x / Protein: 30 mg/dL / Nitrite: Negative   Leuk Esterase: Trace / RBC: 2-5 /HPF / WBC 3-5 /HPF   Sq Epi: x / Non Sq Epi: Few /HPF / Bacteria: Few /HPF    ASSESSMENT AND PLAN:  1. Impaired renal function appears chronic, however there might be acute component due to volume depletion (vomiting). There is no indication for emergent HD  2. R/O CKD due to Diabetic nephropathy  3. Anemia possible due to CKD  4. Hypoglycemia   Continue current IVF  Keep patient euvolemic and renal diet  Avoid Nephrtoxic Meds/ Agents such as NSAIDs, Gadolinium contrast, Phosphate containing enemas, etc..)  Adjust Medications according to eGFR  Obtain TSAT, PO4, PTH, spot urine for protein and creatinine, Renal US  Follow BMP, H/H  Many thanks

## 2019-01-21 LAB
ALBUMIN SERPL ELPH-MCNC: 3.1 G/DL — LOW (ref 3.5–5)
ALP SERPL-CCNC: 91 U/L — SIGNIFICANT CHANGE UP (ref 40–120)
ALT FLD-CCNC: 19 U/L DA — SIGNIFICANT CHANGE UP (ref 10–60)
ANION GAP SERPL CALC-SCNC: 11 MMOL/L — SIGNIFICANT CHANGE UP (ref 5–17)
AST SERPL-CCNC: 10 U/L — SIGNIFICANT CHANGE UP (ref 10–40)
BASOPHILS # BLD AUTO: 0.2 K/UL — SIGNIFICANT CHANGE UP (ref 0–0.2)
BASOPHILS NFR BLD AUTO: 3 % — HIGH (ref 0–2)
BILIRUB SERPL-MCNC: 0.3 MG/DL — SIGNIFICANT CHANGE UP (ref 0.2–1.2)
BUN SERPL-MCNC: 56 MG/DL — HIGH (ref 7–18)
CALCIUM SERPL-MCNC: 8.2 MG/DL — LOW (ref 8.4–10.5)
CHLORIDE SERPL-SCNC: 112 MMOL/L — HIGH (ref 96–108)
CO2 SERPL-SCNC: 22 MMOL/L — SIGNIFICANT CHANGE UP (ref 22–31)
CREAT SERPL-MCNC: 7.39 MG/DL — HIGH (ref 0.5–1.3)
EOSINOPHIL # BLD AUTO: 0.2 K/UL — SIGNIFICANT CHANGE UP (ref 0–0.5)
EOSINOPHIL NFR BLD AUTO: 4.6 % — SIGNIFICANT CHANGE UP (ref 0–6)
GLUCOSE BLDC GLUCOMTR-MCNC: 163 MG/DL — HIGH (ref 70–99)
GLUCOSE BLDC GLUCOMTR-MCNC: 222 MG/DL — HIGH (ref 70–99)
GLUCOSE BLDC GLUCOMTR-MCNC: 286 MG/DL — HIGH (ref 70–99)
GLUCOSE BLDC GLUCOMTR-MCNC: 94 MG/DL — SIGNIFICANT CHANGE UP (ref 70–99)
GLUCOSE SERPL-MCNC: 83 MG/DL — SIGNIFICANT CHANGE UP (ref 70–99)
HCT VFR BLD CALC: 23.8 % — LOW (ref 34.5–45)
HGB BLD-MCNC: 7.4 G/DL — LOW (ref 11.5–15.5)
LYMPHOCYTES # BLD AUTO: 2.2 K/UL — SIGNIFICANT CHANGE UP (ref 1–3.3)
LYMPHOCYTES # BLD AUTO: 42.4 % — SIGNIFICANT CHANGE UP (ref 13–44)
MCHC RBC-ENTMCNC: 26.8 PG — LOW (ref 27–34)
MCHC RBC-ENTMCNC: 31 GM/DL — LOW (ref 32–36)
MCV RBC AUTO: 86.3 FL — SIGNIFICANT CHANGE UP (ref 80–100)
MONOCYTES # BLD AUTO: 0.5 K/UL — SIGNIFICANT CHANGE UP (ref 0–0.9)
MONOCYTES NFR BLD AUTO: 9.2 % — SIGNIFICANT CHANGE UP (ref 2–14)
NEUTROPHILS # BLD AUTO: 2.1 K/UL — SIGNIFICANT CHANGE UP (ref 1.8–7.4)
NEUTROPHILS NFR BLD AUTO: 40.7 % — LOW (ref 43–77)
PLATELET # BLD AUTO: 220 K/UL — SIGNIFICANT CHANGE UP (ref 150–400)
POTASSIUM SERPL-MCNC: 3.8 MMOL/L — SIGNIFICANT CHANGE UP (ref 3.5–5.3)
POTASSIUM SERPL-SCNC: 3.8 MMOL/L — SIGNIFICANT CHANGE UP (ref 3.5–5.3)
PROT SERPL-MCNC: 6.6 G/DL — SIGNIFICANT CHANGE UP (ref 6–8.3)
RBC # BLD: 2.76 M/UL — LOW (ref 3.8–5.2)
RBC # FLD: 11.4 % — SIGNIFICANT CHANGE UP (ref 10.3–14.5)
SODIUM SERPL-SCNC: 145 MMOL/L — SIGNIFICANT CHANGE UP (ref 135–145)
TRANSFERRIN SERPL-MCNC: 213 MG/DL — SIGNIFICANT CHANGE UP (ref 200–360)
WBC # BLD: 5.2 K/UL — SIGNIFICANT CHANGE UP (ref 3.8–10.5)
WBC # FLD AUTO: 5.2 K/UL — SIGNIFICANT CHANGE UP (ref 3.8–10.5)

## 2019-01-21 RX ORDER — FLUTICASONE PROPIONATE 50 MCG
1 SPRAY, SUSPENSION NASAL
Qty: 0 | Refills: 0 | Status: DISCONTINUED | OUTPATIENT
Start: 2019-01-21 | End: 2019-02-14

## 2019-01-21 RX ORDER — ERYTHROPOIETIN 10000 [IU]/ML
6000 INJECTION, SOLUTION INTRAVENOUS; SUBCUTANEOUS
Qty: 0 | Refills: 0 | Status: DISCONTINUED | OUTPATIENT
Start: 2019-01-21 | End: 2019-01-30

## 2019-01-21 RX ADMIN — Medication 81 MILLIGRAM(S): at 12:04

## 2019-01-21 RX ADMIN — ERYTHROPOIETIN 6000 UNIT(S): 10000 INJECTION, SOLUTION INTRAVENOUS; SUBCUTANEOUS at 18:13

## 2019-01-21 RX ADMIN — Medication 0.5 MILLIGRAM(S): at 18:17

## 2019-01-21 RX ADMIN — AMLODIPINE BESYLATE 5 MILLIGRAM(S): 2.5 TABLET ORAL at 06:08

## 2019-01-21 RX ADMIN — Medication 3: at 12:03

## 2019-01-21 RX ADMIN — Medication 0.5 MILLIGRAM(S): at 06:07

## 2019-01-21 RX ADMIN — Medication 50 MICROGRAM(S): at 06:07

## 2019-01-21 RX ADMIN — Medication 2: at 18:12

## 2019-01-21 NOTE — PROGRESS NOTE ADULT - ASSESSMENT
53 female with PMH of DM , Schizophrenia , HLD , comes in with complaint of vomiting. Patient was BIB EMS to the ED for from assisted living with the report that patient was difficult to arouse, hot and sweatingWill admit to telemetry for New onset Renal Failure.     Problem/Plan - 1:  ·  Problem: Acute renal failure.  Plan: Comes in with Acute Kidney failure , no known history of CKD  Could have underlying CKD given h/o DM      Problem/Plan - 2:  ·  Problem: Hyperkalemia.  Plan: Hyperkalemia on labs , K of 5.5 , likely from kidney failure   s/p cocktail , d50 , insulin , duoneb , kayxelate   Corrected    Problem/Plan - 3:  ·  Problem: Anemia.  Plan: Hb of 8.8 with normal MCV   Likely anemia in chronic dz  Would get anemia panel   No signs of active bleeding   Occult with next BM  Refused occult on admission  Monitor cbc daily.     Problem/Plan - 4:  ·  Problem: Diabetes.  Plan: On Glucophage and amyryl at home , will hold   Start hss       Problem/Plan - 5:  ·  Problem: Schizophrenia.  Plan: on monthly injections of invega   Euphoric mood on exam.     Problem/Plan - 6:  Problem: HLD (hyperlipidemia).Plan: c/w zocor Lipid Profile in AM (01.20.19 @ 06:43)    Total Cholesterol/HDL Ratio Measurement: 2.8 RATIO    Cholesterol, Serum: 94 mg/dL    Triglycerides, Serum: 114 mg/dL    HDL Cholesterol, Cya71ws/dL    Problem/Plan - 7:  ·  Problem: Hypothyroid.  Plan: will start synthroid 50 OD  Primary team to confirm doses

## 2019-01-21 NOTE — ED ADULT NURSE REASSESSMENT NOTE - NS ED NURSE REASSESS COMMENT FT1
PT AOX3 admitted for kidney failure. Skin intact. Denies respiratory distress or chest pain. Ambulates at [place]. Sleeps the whole day but responds when called. Yoder 16" intact and patent. Vitals stable, afebrile. Tele monitoring discontinued. Normal Sinus rhythm.
Pt noted sleepy most of the time easily arose by verbal stimuli no complains voiced F/C in place continue monitoring safety maintained

## 2019-01-21 NOTE — PROGRESS NOTE ADULT - SUBJECTIVE AND OBJECTIVE BOX
SUBJECTIVE:  pt feels fine and denies cp,sob,gi or uremic  symptons  U/O good via del cid cath        REVIEW OF SYSTEMS:  CONSTITUTIONAL: No weakness, fevers or chills  EYES/ENT: No visual changes;  No vertigo or throat pain   NECK: No pain or stiffness  RESPIRATORY: No cough, wheezing, hemoptysis; No shortness of breath  CARDIOVASCULAR: No chest pain or palpitations  GASTROINTESTINAL: No abdominal or epigastric pain. No nausea, vomiting, or hematemesis; No diarrhea or constipation. No melena or hematochezia.  GENITOURINARY: No dysuria, frequency , hematuria, flank pain or nocturia  NEUROLOGICAL: No numbness or weakness  SKIN: No itching, burning, rashes, or lesions   All other review of systems is negative unless indicated above    Current meds:    amLODIPine   Tablet 5 milliGRAM(s) Oral daily  aspirin enteric coated 81 milliGRAM(s) Oral daily  benztropine 0.5 milliGRAM(s) Oral two times a day  glucagon  Injectable 1 milliGRAM(s) IntraMuscular once PRN  insulin lispro (HumaLOG) corrective regimen sliding scale   SubCutaneous Before meals and at bedtime  levothyroxine 50 MICROGram(s) Oral daily  simvastatin 20 milliGRAM(s) Oral at bedtime  sodium chloride 0.45%. 1000 milliLiter(s) IV Continuous <Continuous>      Vital Signs    T(F): 97.8 (01-21-19 @ 11:37), Max: 98.4 (01-20-19 @ 22:47)  HR: 77 (01-21-19 @ 11:37) (56 - 77)  BP: 132/60 (01-21-19 @ 11:37) (117/47 - 132/60)  RR: 16 (01-21-19 @ 11:37) (16 - 18)  SpO2: 100% (01-21-19 @ 11:37) (100% - 100%)  I and O's:    01-20 @ 07:01  -  01-21 @ 07:00  --------------------------------------------------------  IN:  Total IN: 0 mL    OUT:    Indwelling Catheter - Urethral: 1900 mL    Voided: 1500 mL  Total OUT: 3400 mL    Total NET: -3400 mL    PHYSICAL EXAM:  Constitutional: well developed, obese  and in nad  HEENT: PERRLA,  no icteric sclera and mild pallor of conjunctiva noted  Neck: No JVD, thyromegaly or adenopathy  Respiratory: reduced air entry lower lungs with no rales, wheezing or rhonchi  Cardiovascular: S1 and S2 normally heard  Gastrointestinal: soft,obese, nondistended, nontender and normal bowel sounds heard  Extremities: No peripheral edema or cyanosis  Neurological: A/O x 3, no focal deficits  : No flank or cva tenderness palpated.  Skin: No rashes      LABS:    CBC:                          7.4    5.2   )-----------( 220      ( 21 Jan 2019 06:07 )             23.8     BMP:    01-21    145  |  112<H>  |  56<H>  ----------------------------<  83  3.8   |  22  |  7.39<H>  01-20    143  |  112<H>  |  58<H>  ----------------------------<  156<H>  4.5   |  22  |  7.74<H>  01-20    146<H>  |  116<H>  |  63<H>  ----------------------------<  49<L>  4.7   |  19<L>  |  7.74<H>  01-20    146<H>  |  116<H>  |  60<H>  ----------------------------<  50<L>  4.6   |  20<L>  |  7.62<H>  01-19    141  |  110<H>  |  59<H>  ----------------------------<  244<H>  5.5<H>   |  18<L>  |  7.93<H>  01-19    140  |  111<H>  |  58<H>  ----------------------------<  97  4.8   |  15<L>  |  7.74<H>    Ca    8.2<L>      21 Jan 2019 06:07  Ca    8.1<L>      20 Jan 2019 11:07  Ca    8.4      20 Jan 2019 06:43  Ca    8.5      20 Jan 2019 03:17  Ca    8.9      19 Jan 2019 21:53  Ca    9.0      19 Jan 2019 19:16  Phos  4.0     01-20  Mg     1.6     01-20  Mg     1.4     01-19    TPro  6.6  /  Alb  3.1<L>  /  TBili  0.3  /  DBili  x   /  AST  10  /  ALT  19  /  AlkPhos  91  01-21  TPro  7.2  /  Alb  3.2<L>  /  TBili  0.3  /  DBili  x   /  AST  12  /  ALT  22  /  AlkPhos  99  01-20  TPro  8.3  /  Alb  4.0  /  TBili  0.3  /  DBili  x   /  AST  19  /  ALT  28  /  AlkPhos  110  01-19      Thyroid Stimulating Hormone, Serum: 1.48 uU/mL (01-20 @ 06:43)  Thyroid Stimulating Hormone, Serum: 0.76 uU/mL (01-19 @ 19:16)    Iron with Total Binding Capacity (01.19.19 @ 22:19)    Iron - Total Binding Capacity.: 300 ug/dL    % Saturation, Iron: 22 %    Iron Total, Serum: 66 ug/dL    Unsaturated Iron Binding Capacity: 234 ug/dL      URINE STUDIES:  Sodium, Random Urine: 52 mmol/L (01-19 @ 21:58)  Creatinine, Random Urine: 60 mg/dL (01-19 @ 21:58)  Osmolality, Random Urine: 284 mos/kg (01-19 @ 21:58)    Total Protein, Random Urine (01.19.19 @ 21:58)    Total Protein, Random Urine: 51 mg/dL

## 2019-01-21 NOTE — PROGRESS NOTE ADULT - SUBJECTIVE AND OBJECTIVE BOX
pt seen and examined.pts current chart reviewed and case discussed with resident covering.    SUBJECTIVE:  pt feels fine and denies cp,sob,gi or gu/uremic  symptons    REVIEW OF SYSTEMS:  CONSTITUTIONAL: No weakness, fevers or chills  EYES/ENT: No visual changes;  No vertigo or throat pain   NECK: No pain or stiffness  RESPIRATORY: No cough, wheezing, hemoptysis; No shortness of breath  CARDIOVASCULAR: No chest pain or palpitations  GASTROINTESTINAL: No abdominal or epigastric pain. No nausea, vomiting, or hematemesis; No diarrhea or constipation. No melena or hematochezia.  GENITOURINARY: No dysuria, frequency , hematuria, flank pain or nocturia  NEUROLOGICAL: No numbness or weakness  SKIN: No itching, burning, rashes, or lesions   All other review of systems is negative unless indicated above    Current meds:    amLODIPine   Tablet 5 milliGRAM(s) Oral daily  aspirin enteric coated 81 milliGRAM(s) Oral daily  benztropine 0.5 milliGRAM(s) Oral two times a day  dextrose 40% Gel 15 Gram(s) Oral once PRN  dextrose 5%. 1000 milliLiter(s) IV Continuous <Continuous>  dextrose 50% Injectable 12.5 Gram(s) IV Push once  dextrose 50% Injectable 25 Gram(s) IV Push once  dextrose 50% Injectable 25 Gram(s) IV Push once  glucagon  Injectable 1 milliGRAM(s) IntraMuscular once PRN  insulin lispro (HumaLOG) corrective regimen sliding scale   SubCutaneous Before meals and at bedtime  levothyroxine 50 MICROGram(s) Oral daily  simvastatin 20 milliGRAM(s) Oral at bedtime  sodium chloride 0.45%. 1000 milliLiter(s) IV Continuous <Continuous>      Vital Signs    T(F): 97.8 (01-21-19 @ 11:37), Max: 98.4 (01-20-19 @ 22:47)  HR: 77 (01-21-19 @ 11:37) (56 - 77)  BP: 132/60 (01-21-19 @ 11:37) (117/47 - 132/60)  ABP: --  RR: 16 (01-21-19 @ 11:37) (16 - 18)  SpO2: 100% (01-21-19 @ 11:37) (100% - 100%)  Wt(kg): --  CVP(cm H2O): --  CO: --  PCWP: --    I and O's:    01-20 @ 07:01  -  01-21 @ 07:00  --------------------------------------------------------  IN:  Total IN: 0 mL    OUT:    Indwelling Catheter - Urethral: 1900 mL    Voided: 1500 mL  Total OUT: 3400 mL    Total NET: -3400 mL        Daily     Daily     PHYSICAL EXAM:  Constitutional: well developed, well nourished  and in nad  HEENT: PERRLA,  no icteric sclera and mild pallor of conjunctiva noted  Neck: No JVD, thyromegaly or adenopathy  Respiratory: reduced air entry lower lungs with no rales, wheezing or rhonchi  Cardiovascular: S1 and S2 normally heard  Gastrointestinal: soft, nondistended, nontender and normal bowel sounds heard  Extremities: No peripheral edema or cyanosis  Neurological: A/O x 3, no focal deficits  : No flank or cva tenderness palpated.  Skin: No rashes      LABS:    CBC:                          7.4    5.2   )-----------( 220      ( 21 Jan 2019 06:07 )             23.8           BMP:    01-21    145  |  112<H>  |  56<H>  ----------------------------<  83  3.8   |  22  |  7.39<H>  01-20    143  |  112<H>  |  58<H>  ----------------------------<  156<H>  4.5   |  22  |  7.74<H>  01-20    146<H>  |  116<H>  |  63<H>  ----------------------------<  49<L>  4.7   |  19<L>  |  7.74<H>  01-20    146<H>  |  116<H>  |  60<H>  ----------------------------<  50<L>  4.6   |  20<L>  |  7.62<H>  01-19    141  |  110<H>  |  59<H>  ----------------------------<  244<H>  5.5<H>   |  18<L>  |  7.93<H>  01-19    140  |  111<H>  |  58<H>  ----------------------------<  97  4.8   |  15<L>  |  7.74<H>    Ca    8.2<L>      21 Jan 2019 06:07  Ca    8.1<L>      20 Jan 2019 11:07  Ca    8.4      20 Jan 2019 06:43  Ca    8.5      20 Jan 2019 03:17  Ca    8.9      19 Jan 2019 21:53  Ca    9.0      19 Jan 2019 19:16  Phos  4.0     01-20  Mg     1.6     01-20  Mg     1.4     01-19    TPro  6.6  /  Alb  3.1<L>  /  TBili  0.3  /  DBili  x   /  AST  10  /  ALT  19  /  AlkPhos  91  01-21  TPro  7.2  /  Alb  3.2<L>  /  TBili  0.3  /  DBili  x   /  AST  12  /  ALT  22  /  AlkPhos  99  01-20  TPro  8.3  /  Alb  4.0  /  TBili  0.3  /  DBili  x   /  AST  19  /  ALT  28  /  AlkPhos  110  01-19      Thyroid Stimulating Hormone, Serum: 1.48 uU/mL (01-20 @ 06:43)  Thyroid Stimulating Hormone, Serum: 0.76 uU/mL (01-19 @ 19:16)      URINE STUDIES:        Sodium, Random Urine: 52 mmol/L (01-19 @ 21:58)  Creatinine, Random Urine: 60 mg/dL (01-19 @ 21:58)  Osmolality, Random Urine: 284 mos/kg (01-19 @ 21:58)                  RADIOLOGY & ADDITIONAL STUDIES: pt seen and examined.pts current chart reviewed and case discussed with NP  covering.    SUBJECTIVE:  pt feels fine and denies cp,sob,gi or uremic  symptons  U/O good via del cid cath  REVIEW OF SYSTEMS:  CONSTITUTIONAL: No weakness, fevers or chills  EYES/ENT: No visual changes;  No vertigo or throat pain   NECK: No pain or stiffness  RESPIRATORY: No cough, wheezing, hemoptysis; No shortness of breath  CARDIOVASCULAR: No chest pain or palpitations  GASTROINTESTINAL: No abdominal or epigastric pain. No nausea, vomiting, or hematemesis; No diarrhea or constipation. No melena or hematochezia.  GENITOURINARY: No dysuria, frequency , hematuria, flank pain or nocturia  NEUROLOGICAL: No numbness or weakness  SKIN: No itching, burning, rashes, or lesions   All other review of systems is negative unless indicated above    Current meds:    amLODIPine   Tablet 5 milliGRAM(s) Oral daily  aspirin enteric coated 81 milliGRAM(s) Oral daily  benztropine 0.5 milliGRAM(s) Oral two times a day  dextrose 40% Gel 15 Gram(s) Oral once PRN  dextrose 5%. 1000 milliLiter(s) IV Continuous <Continuous>  dextrose 50% Injectable 12.5 Gram(s) IV Push once  dextrose 50% Injectable 25 Gram(s) IV Push once  dextrose 50% Injectable 25 Gram(s) IV Push once  glucagon  Injectable 1 milliGRAM(s) IntraMuscular once PRN  insulin lispro (HumaLOG) corrective regimen sliding scale   SubCutaneous Before meals and at bedtime  levothyroxine 50 MICROGram(s) Oral daily  simvastatin 20 milliGRAM(s) Oral at bedtime  sodium chloride 0.45%. 1000 milliLiter(s) IV Continuous <Continuous>      Vital Signs    T(F): 97.8 (01-21-19 @ 11:37), Max: 98.4 (01-20-19 @ 22:47)  HR: 77 (01-21-19 @ 11:37) (56 - 77)  BP: 132/60 (01-21-19 @ 11:37) (117/47 - 132/60)  ABP: --  RR: 16 (01-21-19 @ 11:37) (16 - 18)  SpO2: 100% (01-21-19 @ 11:37) (100% - 100%)  Wt(kg): --  CVP(cm H2O): --  CO: --  PCWP: --    I and O's:    01-20 @ 07:01  -  01-21 @ 07:00  --------------------------------------------------------  IN:  Total IN: 0 mL    OUT:    Indwelling Catheter - Urethral: 1900 mL    Voided: 1500 mL  Total OUT: 3400 mL    Total NET: -3400 mL        Daily     Daily     PHYSICAL EXAM:  Constitutional: well developed, obese  and in nad  HEENT: PERRLA,  no icteric sclera and mild pallor of conjunctiva noted  Neck: No JVD, thyromegaly or adenopathy  Respiratory: reduced air entry lower lungs with no rales, wheezing or rhonchi  Cardiovascular: S1 and S2 normally heard  Gastrointestinal: soft,obese, nondistended, nontender and normal bowel sounds heard  Extremities: No peripheral edema or cyanosis  Neurological: A/O x 3, no focal deficits  : No flank or cva tenderness palpated.  Skin: No rashes      LABS:    CBC:                          7.4    5.2   )-----------( 220      ( 21 Jan 2019 06:07 )             23.8     BMP:    01-21    145  |  112<H>  |  56<H>  ----------------------------<  83  3.8   |  22  |  7.39<H>  01-20    143  |  112<H>  |  58<H>  ----------------------------<  156<H>  4.5   |  22  |  7.74<H>  01-20    146<H>  |  116<H>  |  63<H>  ----------------------------<  49<L>  4.7   |  19<L>  |  7.74<H>  01-20    146<H>  |  116<H>  |  60<H>  ----------------------------<  50<L>  4.6   |  20<L>  |  7.62<H>  01-19    141  |  110<H>  |  59<H>  ----------------------------<  244<H>  5.5<H>   |  18<L>  |  7.93<H>  01-19    140  |  111<H>  |  58<H>  ----------------------------<  97  4.8   |  15<L>  |  7.74<H>    Ca    8.2<L>      21 Jan 2019 06:07  Ca    8.1<L>      20 Jan 2019 11:07  Ca    8.4      20 Jan 2019 06:43  Ca    8.5      20 Jan 2019 03:17  Ca    8.9      19 Jan 2019 21:53  Ca    9.0      19 Jan 2019 19:16  Phos  4.0     01-20  Mg     1.6     01-20  Mg     1.4     01-19    TPro  6.6  /  Alb  3.1<L>  /  TBili  0.3  /  DBili  x   /  AST  10  /  ALT  19  /  AlkPhos  91  01-21  TPro  7.2  /  Alb  3.2<L>  /  TBili  0.3  /  DBili  x   /  AST  12  /  ALT  22  /  AlkPhos  99  01-20  TPro  8.3  /  Alb  4.0  /  TBili  0.3  /  DBili  x   /  AST  19  /  ALT  28  /  AlkPhos  110  01-19      Thyroid Stimulating Hormone, Serum: 1.48 uU/mL (01-20 @ 06:43)  Thyroid Stimulating Hormone, Serum: 0.76 uU/mL (01-19 @ 19:16)    Iron with Total Binding Capacity (01.19.19 @ 22:19)    Iron - Total Binding Capacity.: 300 ug/dL    % Saturation, Iron: 22 %    Iron Total, Serum: 66 ug/dL    Unsaturated Iron Binding Capacity: 234 ug/dL      URINE STUDIES:  Sodium, Random Urine: 52 mmol/L (01-19 @ 21:58)  Creatinine, Random Urine: 60 mg/dL (01-19 @ 21:58)  Osmolality, Random Urine: 284 mos/kg (01-19 @ 21:58)    Total Protein, Random Urine (01.19.19 @ 21:58)    Total Protein, Random Urine: 51 mg/dL                  RADIOLOGY & ADDITIONAL STUDIES:    < from: CT Abdomen and Pelvis No Cont (01.20.19 @ 01:51) >  EXAM:  CT ABDOMEN AND PELVIS                            PROCEDURE DATE:  01/20/2019          INTERPRETATION:  CT of the Abdomen without contrast and CT of the Pelvis   without contrast    HISTORY: Acute renal failure. Evaluate for possible urinary obstruction.    TECHNIQUE: Multiple axial scans of the abdomen and pelvis were obtained   without administration of IV contrast material.  A small amount of oral   contrast is present.    Interpretation: Liver: No focal lesions in this noncontrast study.      Biliary tree: Not dilated.      Solid organs: unremarkable.      Retroperitoneum: No evidence of lymphadenopathy.      Bowel: No evidence of ascites, bowel obstruction or free air.      Appendix: Not dilated    Urinary bladder: Fills with smooth margins.      Pelvis: Shows no free fluid.      The inguinal regions are unremarkable.      The lung bases are clear.      IMPRESSION: No evidence of kidney stones or hydronephrosis.  The above findings correspond to a report provided by the offsite   overnight image reading service at the time of the examination.    < from: Xray Chest 1 View-PORTABLE IMMEDIATE (01.19.19 @ 21:37) >  EXAM:  XR CHEST PORTABLE IMMED 1V                            PROCEDURE DATE:  01/19/2019          INTERPRETATION:  AP semierect chest on January 19, 2019 at 9:23 PM.   Concern is fluid overload. Patient is lethargic.    COMPARISON: None available.    Heart is magnified by technique.    The lung fields and pleural surfaces are unremarkable.    IMPRESSION: Clear lungs.    < end of copied text >

## 2019-01-21 NOTE — PROGRESS NOTE ADULT - ATTENDING COMMENTS
Patient was seen and examined,interim events noted,labs and radiology studies reviewed.  Marshall Sanchez MD,FACC.  [address] [place] [address].  [facility].  [phone]

## 2019-01-21 NOTE — PROGRESS NOTE ADULT - ASSESSMENT
ASSESSMENT AND PLAN:  1. Impaired renal function appears chronic, however there might be acute component due to volume depletion (vomiting). There is no indication for  HD at this time  2. R/O CKD due to Diabetic nephropathy  -pts baseline cr is [place]   -please obatin old records from assited living home  -obtain renal us to check kidney size  -f/u bmp dailyKeep patient euvolemic and renal diet  Avoid Nephrtoxic Meds/ Agents such as NSAIDs, Gadolinium contrast, Phosphate containing enemas, etc..)  Adjust Medications according to eGFR  3. 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

## 2019-01-22 DIAGNOSIS — E11.9 TYPE 2 DIABETES MELLITUS WITHOUT COMPLICATIONS: ICD-10-CM

## 2019-01-22 LAB
ALBUMIN SERPL ELPH-MCNC: 3.3 G/DL — LOW (ref 3.5–5)
ALP SERPL-CCNC: 96 U/L — SIGNIFICANT CHANGE UP (ref 40–120)
ALT FLD-CCNC: 20 U/L DA — SIGNIFICANT CHANGE UP (ref 10–60)
ANION GAP SERPL CALC-SCNC: 11 MMOL/L — SIGNIFICANT CHANGE UP (ref 5–17)
AST SERPL-CCNC: 12 U/L — SIGNIFICANT CHANGE UP (ref 10–40)
BILIRUB SERPL-MCNC: 0.2 MG/DL — SIGNIFICANT CHANGE UP (ref 0.2–1.2)
BUN SERPL-MCNC: 71 MG/DL — HIGH (ref 7–18)
CALCIUM SERPL-MCNC: 8.6 MG/DL — SIGNIFICANT CHANGE UP (ref 8.4–10.5)
CALCIUM SERPL-MCNC: 9.1 MG/DL — SIGNIFICANT CHANGE UP (ref 8.4–10.5)
CHLORIDE SERPL-SCNC: 111 MMOL/L — HIGH (ref 96–108)
CO2 SERPL-SCNC: 20 MMOL/L — LOW (ref 22–31)
CREAT SERPL-MCNC: 7.92 MG/DL — HIGH (ref 0.5–1.3)
EOSINOPHIL NFR BLD AUTO: 2 % — SIGNIFICANT CHANGE UP (ref 0–6)
GLUCOSE BLDC GLUCOMTR-MCNC: 101 MG/DL — HIGH (ref 70–99)
GLUCOSE BLDC GLUCOMTR-MCNC: 201 MG/DL — HIGH (ref 70–99)
GLUCOSE BLDC GLUCOMTR-MCNC: 207 MG/DL — HIGH (ref 70–99)
GLUCOSE BLDC GLUCOMTR-MCNC: 245 MG/DL — HIGH (ref 70–99)
GLUCOSE SERPL-MCNC: 94 MG/DL — SIGNIFICANT CHANGE UP (ref 70–99)
HCT VFR BLD CALC: 22.9 % — LOW (ref 34.5–45)
HGB BLD-MCNC: 7.2 G/DL — LOW (ref 11.5–15.5)
LYMPHOCYTES # BLD AUTO: 39 % — SIGNIFICANT CHANGE UP (ref 13–44)
MAGNESIUM SERPL-MCNC: 1.7 MG/DL — SIGNIFICANT CHANGE UP (ref 1.6–2.6)
MCHC RBC-ENTMCNC: 27 PG — SIGNIFICANT CHANGE UP (ref 27–34)
MCHC RBC-ENTMCNC: 31.2 GM/DL — LOW (ref 32–36)
MCV RBC AUTO: 86.6 FL — SIGNIFICANT CHANGE UP (ref 80–100)
MONOCYTES NFR BLD AUTO: 4 % — SIGNIFICANT CHANGE UP (ref 2–14)
NEUTROPHILS NFR BLD AUTO: 53 % — SIGNIFICANT CHANGE UP (ref 43–77)
PHOSPHATE SERPL-MCNC: 4.7 MG/DL — HIGH (ref 2.5–4.5)
PLATELET # BLD AUTO: 223 K/UL — SIGNIFICANT CHANGE UP (ref 150–400)
POTASSIUM SERPL-MCNC: 4 MMOL/L — SIGNIFICANT CHANGE UP (ref 3.5–5.3)
POTASSIUM SERPL-SCNC: 4 MMOL/L — SIGNIFICANT CHANGE UP (ref 3.5–5.3)
PROT SERPL-MCNC: 7.1 G/DL — SIGNIFICANT CHANGE UP (ref 6–8.3)
PTH-INTACT FLD-MCNC: 116 PG/ML — HIGH (ref 15–65)
RBC # BLD: 2.65 M/UL — LOW (ref 3.8–5.2)
RBC # FLD: 11.5 % — SIGNIFICANT CHANGE UP (ref 10.3–14.5)
SODIUM SERPL-SCNC: 142 MMOL/L — SIGNIFICANT CHANGE UP (ref 135–145)
WBC # BLD: 5.6 K/UL — SIGNIFICANT CHANGE UP (ref 3.8–10.5)
WBC # FLD AUTO: 5.6 K/UL — SIGNIFICANT CHANGE UP (ref 3.8–10.5)

## 2019-01-22 PROCEDURE — 76770 US EXAM ABDO BACK WALL COMP: CPT | Mod: 26

## 2019-01-22 RX ORDER — LEVOTHYROXINE SODIUM 125 MCG
25 TABLET ORAL DAILY
Qty: 0 | Refills: 0 | Status: DISCONTINUED | OUTPATIENT
Start: 2019-01-22 | End: 2019-02-14

## 2019-01-22 RX ORDER — AMLODIPINE BESYLATE 2.5 MG/1
0 TABLET ORAL
Qty: 0 | Refills: 0 | COMMUNITY

## 2019-01-22 RX ORDER — METFORMIN HYDROCHLORIDE 850 MG/1
0 TABLET ORAL
Qty: 0 | Refills: 0 | COMMUNITY

## 2019-01-22 RX ORDER — HEPARIN SODIUM 5000 [USP'U]/ML
5000 INJECTION INTRAVENOUS; SUBCUTANEOUS EVERY 8 HOURS
Qty: 0 | Refills: 0 | Status: DISCONTINUED | OUTPATIENT
Start: 2019-01-22 | End: 2019-02-05

## 2019-01-22 RX ORDER — LEVOTHYROXINE SODIUM 125 MCG
0 TABLET ORAL
Qty: 0 | Refills: 0 | COMMUNITY

## 2019-01-22 RX ORDER — SIMVASTATIN 20 MG/1
0 TABLET, FILM COATED ORAL
Qty: 0 | Refills: 0 | COMMUNITY

## 2019-01-22 RX ADMIN — AMLODIPINE BESYLATE 5 MILLIGRAM(S): 2.5 TABLET ORAL at 06:51

## 2019-01-22 RX ADMIN — SIMVASTATIN 20 MILLIGRAM(S): 20 TABLET, FILM COATED ORAL at 22:05

## 2019-01-22 RX ADMIN — Medication 1 SPRAY(S): at 17:28

## 2019-01-22 RX ADMIN — Medication 0.5 MILLIGRAM(S): at 06:51

## 2019-01-22 RX ADMIN — Medication 1 SPRAY(S): at 06:51

## 2019-01-22 RX ADMIN — HEPARIN SODIUM 5000 UNIT(S): 5000 INJECTION INTRAVENOUS; SUBCUTANEOUS at 22:06

## 2019-01-22 RX ADMIN — Medication 50 MICROGRAM(S): at 06:51

## 2019-01-22 RX ADMIN — Medication 2: at 22:05

## 2019-01-22 RX ADMIN — Medication 0.5 MILLIGRAM(S): at 17:28

## 2019-01-22 RX ADMIN — Medication 81 MILLIGRAM(S): at 12:07

## 2019-01-22 RX ADMIN — SIMVASTATIN 20 MILLIGRAM(S): 20 TABLET, FILM COATED ORAL at 00:04

## 2019-01-22 RX ADMIN — Medication 2: at 12:07

## 2019-01-22 RX ADMIN — Medication 2: at 17:27

## 2019-01-22 NOTE — PROGRESS NOTE ADULT - SUBJECTIVE AND OBJECTIVE BOX
pt seen and examined.pts current chart reviewed and case discussed with resident covering.    SUBJECTIVE:  pt feels fine and denies cp,sob,gi or gu/uremic  symptons    REVIEW OF SYSTEMS:  CONSTITUTIONAL: No weakness, fevers or chills  EYES/ENT: No visual changes;  No vertigo or throat pain   NECK: No pain or stiffness  RESPIRATORY: No cough, wheezing, hemoptysis; No shortness of breath  CARDIOVASCULAR: No chest pain or palpitations  GASTROINTESTINAL: No abdominal or epigastric pain. No nausea, vomiting, or hematemesis; No diarrhea or constipation. No melena or hematochezia.  GENITOURINARY: No dysuria, frequency , hematuria, flank pain or nocturia  NEUROLOGICAL: No numbness or weakness  SKIN: No itching, burning, rashes, or lesions   All other review of systems is negative unless indicated above    Current meds:    amLODIPine   Tablet 5 milliGRAM(s) Oral daily  aspirin enteric coated 81 milliGRAM(s) Oral daily  benztropine 0.5 milliGRAM(s) Oral two times a day  dextrose 40% Gel 15 Gram(s) Oral once PRN  dextrose 5%. 1000 milliLiter(s) IV Continuous <Continuous>  dextrose 50% Injectable 12.5 Gram(s) IV Push once  dextrose 50% Injectable 25 Gram(s) IV Push once  dextrose 50% Injectable 25 Gram(s) IV Push once  epoetin seema Injectable 6000 Unit(s) SubCutaneous <User Schedule>  fluticasone propionate 50 MICROgram(s)/spray Nasal Spray 1 Spray(s) Both Nostrils two times a day  glucagon  Injectable 1 milliGRAM(s) IntraMuscular once PRN  insulin lispro (HumaLOG) corrective regimen sliding scale   SubCutaneous Before meals and at bedtime  levothyroxine 25 MICROGram(s) Oral daily  simvastatin 20 milliGRAM(s) Oral at bedtime      Vital Signs    T(F): 98.6 (01-22-19 @ 08:07), Max: 98.6 (01-22-19 @ 08:07)  HR: 66 (01-22-19 @ 08:07) (66 - 81)  BP: 130/62 (01-22-19 @ 08:07) (122/74 - 134/59)  ABP: --  RR: 16 (01-22-19 @ 08:07) (16 - 18)  SpO2: 100% (01-22-19 @ 08:07) (98% - 100%)  Wt(kg): --  CVP(cm H2O): --  CO: --  PCWP: --    I and O's:    01-20 @ 07:01  -  01-21 @ 07:00  --------------------------------------------------------  IN:  Total IN: 0 mL    OUT:    Indwelling Catheter - Urethral: 1900 mL    Voided: 1500 mL  Total OUT: 3400 mL    Total NET: -3400 mL        Daily     Daily     PHYSICAL EXAM:  Constitutional: well developed, well nourished  and in nad  HEENT: PERRLA,  no icteric sclera and mild pallor of conjunctiva noted  Neck: No JVD, thyromegaly or adenopathy  Respiratory: reduced air entry lower lungs with no rales, wheezing or rhonchi  Cardiovascular: S1 and S2 normally heard  Gastrointestinal: soft, nondistended, nontender and normal bowel sounds heard  Extremities: No peripheral edema or cyanosis  Neurological: A/O x 3, no focal deficits  : No flank or cva tenderness palpated.  Skin: No rashes      LABS:    CBC:                          7.2    5.6   )-----------( 223      ( 22 Jan 2019 06:26 )             22.9           BMP:    01-22    142  |  111<H>  |  71<H>  ----------------------------<  94  4.0   |  20<L>  |  7.92<H>  01-21    145  |  112<H>  |  56<H>  ----------------------------<  83  3.8   |  22  |  7.39<H>  01-20    143  |  112<H>  |  58<H>  ----------------------------<  156<H>  4.5   |  22  |  7.74<H>  01-20    146<H>  |  116<H>  |  63<H>  ----------------------------<  49<L>  4.7   |  19<L>  |  7.74<H>  01-20    146<H>  |  116<H>  |  60<H>  ----------------------------<  50<L>  4.6   |  20<L>  |  7.62<H>  01-19    141  |  110<H>  |  59<H>  ----------------------------<  244<H>  5.5<H>   |  18<L>  |  7.93<H>  01-19    140  |  111<H>  |  58<H>  ----------------------------<  97  4.8   |  15<L>  |  7.74<H>    Ca    8.6      22 Jan 2019 06:26  Ca    8.2<L>      21 Jan 2019 06:07  Ca    8.1<L>      20 Jan 2019 11:07  Ca    8.4      20 Jan 2019 06:43  Ca    8.5      20 Jan 2019 03:17  Ca    8.9      19 Jan 2019 21:53  Ca    9.0      19 Jan 2019 19:16  Phos  4.7     01-22  Phos  4.0     01-20  Mg     1.7     01-22  Mg     1.6     01-20  Mg     1.4     01-19    TPro  7.1  /  Alb  3.3<L>  /  TBili  0.2  /  DBili  x   /  AST  12  /  ALT  20  /  AlkPhos  96  01-22  TPro  6.6  /  Alb  3.1<L>  /  TBili  0.3  /  DBili  x   /  AST  10  /  ALT  19  /  AlkPhos  91  01-21  TPro  7.2  /  Alb  3.2<L>  /  TBili  0.3  /  DBili  x   /  AST  12  /  ALT  22  /  AlkPhos  99  01-20  TPro  8.3  /  Alb  4.0  /  TBili  0.3  /  DBili  x   /  AST  19  /  ALT  28  /  AlkPhos  110  01-19      Intact PTH: 116 pg/mL (01-22 @ 09:19)  Thyroid Stimulating Hormone, Serum: 1.48 uU/mL (01-20 @ 06:43)  Thyroid Stimulating Hormone, Serum: 0.76 uU/mL (01-19 @ 19:16)      URINE STUDIES:        Sodium, Random Urine: 52 mmol/L (01-19 @ 21:58)  Creatinine, Random Urine: 60 mg/dL (01-19 @ 21:58)  Osmolality, Random Urine: 284 mos/kg (01-19 @ 21:58)                  RADIOLOGY & ADDITIONAL STUDIES: pt seen and examined.    SUBJECTIVE:  pt feels fine and denies cp,sob,gi or uremic  symptons  U/O ok via del cid cath  REVIEW OF SYSTEMS:  CONSTITUTIONAL: No weakness, fevers or chills  RESPIRATORY: No cough, wheezing, hemoptysis; No shortness of breath  CARDIOVASCULAR: No chest pain or palpitations  GASTROINTESTINAL: No abdominal or epigastric pain. No nausea, vomiting, or hematemesis; No diarrhea or constipation. No melena or hematochezia.  GENITOURINARY: No dysuria, frequency , hematuria, flank pain or nocturia  NEUROLOGICAL: No numbness or weakness  SKIN: No itching, burning, rashes, or lesions   All other review of systems is negative unless indicated above    Current meds:    amLODIPine   Tablet 5 milliGRAM(s) Oral daily  aspirin enteric coated 81 milliGRAM(s) Oral daily  benztropine 0.5 milliGRAM(s) Oral two times a day  dextrose 40% Gel 15 Gram(s) Oral once PRN  dextrose 5%. 1000 milliLiter(s) IV Continuous <Continuous>  dextrose 50% Injectable 12.5 Gram(s) IV Push once  dextrose 50% Injectable 25 Gram(s) IV Push once  dextrose 50% Injectable 25 Gram(s) IV Push once  epoetin seema Injectable 6000 Unit(s) SubCutaneous <User Schedule>  fluticasone propionate 50 MICROgram(s)/spray Nasal Spray 1 Spray(s) Both Nostrils two times a day  glucagon  Injectable 1 milliGRAM(s) IntraMuscular once PRN  insulin lispro (HumaLOG) corrective regimen sliding scale   SubCutaneous Before meals and at bedtime  levothyroxine 25 MICROGram(s) Oral daily  simvastatin 20 milliGRAM(s) Oral at bedtime      Vital Signs    T(F): 98.6 (01-22-19 @ 08:07), Max: 98.6 (01-22-19 @ 08:07)  HR: 66 (01-22-19 @ 08:07) (66 - 81)  BP: 130/62 (01-22-19 @ 08:07) (122/74 - 134/59)  ABP: --  RR: 16 (01-22-19 @ 08:07) (16 - 18)  SpO2: 100% (01-22-19 @ 08:07) (98% - 100%)  Wt(kg): --  CVP(cm H2O): --  CO: --  PCWP: --    I and O's:    01-20 @ 07:01  -  01-21 @ 07:00  --------------------------------------------------------  IN:  Total IN: 0 mL    OUT:    Indwelling Catheter - Urethral: 1900 mL    Voided: 1500 mL  Total OUT: 3400 mL    Total NET: -3400 mL        Daily     Daily     PHYSICAL EXAM:  Constitutional: well developed, obese  and in nad  HEENT: PERRLA,  no icteric sclera and mild pallor of conjunctiva noted  Neck: No JVD, thyromegaly or adenopathy  Respiratory: reduced air entry lower lungs with no rales, wheezing or rhonchi  Cardiovascular: S1 and S2 normally heard  Gastrointestinal: soft,obese, nondistended, nontender and normal bowel sounds heard  Extremities: No peripheral edema or cyanosis  Neurological: A/O x 3, no focal deficits  : No flank or cva tenderness palpated.  Skin: No rashes    LABS:    CBC:                          7.2    5.6   )-----------( 223      ( 22 Jan 2019 06:26 )             22.9           BMP:    01-22    142  |  111<H>  |  71<H>  ----------------------------<  94  4.0   |  20<L>  |  7.92<H>  01-21    145  |  112<H>  |  56<H>  ----------------------------<  83  3.8   |  22  |  7.39<H>  01-20    143  |  112<H>  |  58<H>  ----------------------------<  156<H>  4.5   |  22  |  7.74<H>  01-20    146<H>  |  116<H>  |  63<H>  ----------------------------<  49<L>  4.7   |  19<L>  |  7.74<H>  01-20    146<H>  |  116<H>  |  60<H>  ----------------------------<  50<L>  4.6   |  20<L>  |  7.62<H>  01-19    141  |  110<H>  |  59<H>  ----------------------------<  244<H>  5.5<H>   |  18<L>  |  7.93<H>  01-19    140  |  111<H>  |  58<H>  ----------------------------<  97  4.8   |  15<L>  |  7.74<H>    Ca    8.6      22 Jan 2019 06:26  Ca    8.2<L>      21 Jan 2019 06:07  Ca    8.1<L>      20 Jan 2019 11:07  Ca    8.4      20 Jan 2019 06:43  Ca    8.5      20 Jan 2019 03:17  Ca    8.9      19 Jan 2019 21:53  Ca    9.0      19 Jan 2019 19:16  Phos  4.7     01-22  Phos  4.0     01-20  Mg     1.7     01-22  Mg     1.6     01-20  Mg     1.4     01-19    TPro  7.1  /  Alb  3.3<L>  /  TBili  0.2  /  DBili  x   /  AST  12  /  ALT  20  /  AlkPhos  96  01-22  TPro  6.6  /  Alb  3.1<L>  /  TBili  0.3  /  DBili  x   /  AST  10  /  ALT  19  /  AlkPhos  91  01-21  TPro  7.2  /  Alb  3.2<L>  /  TBili  0.3  /  DBili  x   /  AST  12  /  ALT  22  /  AlkPhos  99  01-20  TPro  8.3  /  Alb  4.0  /  TBili  0.3  /  DBili  x   /  AST  19  /  ALT  28  /  AlkPhos  110  01-19      Intact PTH: 116 pg/mL (01-22 @ 09:19)  Thyroid Stimulating Hormone, Serum: 1.48 uU/mL (01-20 @ 06:43)  Thyroid Stimulating Hormone, Serum: 0.76 uU/mL (01-19 @ 19:16)      URINE STUDIES:  Sodium, Random Urine: 52 mmol/L (01-19 @ 21:58)  Creatinine, Random Urine: 60 mg/dL (01-19 @ 21:58)  Osmolality, Random Urine: 284 mos/kg (01-19 @ 21:58)    Total Protein, Random Urine (01.19.19 @ 21:58)    Total Protein, Random Urine: 51 mg/dL                  RADIOLOGY & ADDITIONAL STUDIES:    < from: US Kidney and Bladder (01.22.19 @ 11:20) >  EXAM:  US KIDNEYS AND BLADDER                            PROCEDURE DATE:  01/22/2019          INTERPRETATION:  CLINICAL INFORMATION: Renal failure    COMPARISON: CT of the abdomen pelvis dated 1/20/2008 and    TECHNIQUE: Sonography of the kidneys and bladder.     FINDINGS:    Right kidney:  8.4 cm. No renal mass, hydronephrosis or calculi.    Left kidney:  10.1 cm. No renal mass, hydronephrosis or calculi.    Urinary bladder: Incompletely collapsed around a Del Cid catheter    IMPRESSION:     Normal renal ultrasound.    Urinary bladder is incompletely collapsed around a Del Cid catheter.    < end of copied text >

## 2019-01-22 NOTE — PROGRESS NOTE ADULT - SUBJECTIVE AND OBJECTIVE BOX
Resident Note discussed with  primary attending    Patient is a 53y old  Female who presents with a chief complaint of vomiting (21 Jan 2019 12:54)      INTERVAL HPI/OVERNIGHT EVENTS: no new complaints    MEDICATIONS  (STANDING):  amLODIPine   Tablet 5 milliGRAM(s) Oral daily  aspirin enteric coated 81 milliGRAM(s) Oral daily  benztropine 0.5 milliGRAM(s) Oral two times a day  dextrose 5%. 1000 milliLiter(s) (50 mL/Hr) IV Continuous <Continuous>  dextrose 50% Injectable 12.5 Gram(s) IV Push once  dextrose 50% Injectable 25 Gram(s) IV Push once  dextrose 50% Injectable 25 Gram(s) IV Push once  epoetin seema Injectable 6000 Unit(s) SubCutaneous <User Schedule>  fluticasone propionate 50 MICROgram(s)/spray Nasal Spray 1 Spray(s) Both Nostrils two times a day  insulin lispro (HumaLOG) corrective regimen sliding scale   SubCutaneous Before meals and at bedtime  levothyroxine 50 MICROGram(s) Oral daily  simvastatin 20 milliGRAM(s) Oral at bedtime  sodium chloride 0.45%. 1000 milliLiter(s) (80 mL/Hr) IV Continuous <Continuous>    MEDICATIONS  (PRN):  dextrose 40% Gel 15 Gram(s) Oral once PRN Blood Glucose LESS THAN 70 milliGRAM(s)/deciLiter  glucagon  Injectable 1 milliGRAM(s) IntraMuscular once PRN Glucose <70 milliGRAM(s)/deciLiter      __________________________________________________  REVIEW OF SYSTEMS:    CONSTITUTIONAL: No fever,   EYES: no acute visual disturbances  NECK: No pain or stiffness  RESPIRATORY: No cough; No shortness of breath  CARDIOVASCULAR: No chest pain, no palpitations  GASTROINTESTINAL: No pain. No nausea or vomiting; No diarrhea   NEUROLOGICAL: No headache or numbness, no tremors  MUSCULOSKELETAL: No joint pain, no muscle pain  GENITOURINARY: no dysuria, no frequency, no hesitancy  PSYCHIATRY: no depression , no anxiety  ALL OTHER  ROS negative        Vital Signs Last 24 Hrs  T(C): 37 (22 Jan 2019 08:07), Max: 37 (22 Jan 2019 08:07)  T(F): 98.6 (22 Jan 2019 08:07), Max: 98.6 (22 Jan 2019 08:07)  HR: 66 (22 Jan 2019 08:07) (66 - 81)  BP: 130/62 (22 Jan 2019 08:07) (122/74 - 134/59)  BP(mean): --  RR: 16 (22 Jan 2019 08:07) (16 - 18)  SpO2: 100% (22 Jan 2019 08:07) (98% - 100%)    ________________________________________________  PHYSICAL EXAM:  GENERAL: NAD  HEENT:Normocephalic;  conjunctivae and sclerae clear; moist mucous membranes;   NECK : supple  CHEST/LUNG: Clear to auscuitation bilaterally with good air entry   HEART: S1 S2  regular; no murmurs, gallops or rubs  ABDOMEN: Soft, Nontender, Nondistended; Bowel sounds present  EXTREMITIES: no cyanosis; no edema; no calf tenderness  NERVOUS SYSTEM:  Awake and alert; Oriented  to place, person and time ; no new deficits    _________________________________________________  LABS:                        7.2    5.6   )-----------( 223      ( 22 Jan 2019 06:26 )             22.9     01-22    142  |  111<H>  |  71<H>  ----------------------------<  94  4.0   |  20<L>  |  7.92<H>    Ca    8.6      22 Jan 2019 06:26  Phos  4.7     01-22  Mg     1.7     01-22    TPro  7.1  /  Alb  3.3<L>  /  TBili  0.2  /  DBili  x   /  AST  12  /  ALT  20  /  AlkPhos  96  01-22        CAPILLARY BLOOD GLUCOSE      POCT Blood Glucose.: 101 mg/dL (22 Jan 2019 08:18)  POCT Blood Glucose.: 163 mg/dL (21 Jan 2019 21:46)  POCT Blood Glucose.: 222 mg/dL (21 Jan 2019 17:17)  POCT Blood Glucose.: 286 mg/dL (21 Jan 2019 11:11)        RADIOLOGY & ADDITIONAL TESTS:    Imaging Personally Reviewed:  YES    Consultant(s) Notes Reviewed:   YES    Care Discussed with Consultants : YES    Plan of care was discussed with patient and /or primary care giver; all questions and concerns were addressed and care was aligned with patient's wishes.

## 2019-01-22 NOTE — PROGRESS NOTE ADULT - ASSESSMENT
ASSESSMENT AND PLAN:  1. Impaired renal function appears chronic, however there might be acute component due to volume depletion (vomiting). There is no indication for  HD at this time  2. R/O CKD due to Diabetic nephropathy  -pts baseline cr is uk   -please obatin old records from assited living home  -obtain renal us to check kidney size  -f/u bmp dailyKeep patient euvolemic and renal diet  Avoid Nephrtoxic Meds/ Agents such as NSAIDs, Gadolinium contrast, Phosphate containing enemas, etc..)  Adjust Medications according to eGFR  3. 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 Impaired renal function appears chronic, however there might be acute component due to volume depletion (vomiting). There is no indication for  HD at this time  -pts egfr is still mildly worsening despite good u/o   2. 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  -f/u bmp daily  -Keep patient euvolemic and renal diet  Avoid Nephrtoxic Meds/ Agents such as NSAIDs, Gadolinium contrast, Phosphate containing enemas, etc..)  Adjust Medications according to eGFR  -pt may need dialysis if renal function remains poor next few days  3. 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

## 2019-01-22 NOTE — CHART NOTE - NSCHARTNOTEFT_GEN_A_CORE
Pt stated she takes Flonase nasal spray in each nostril 2 x day for allergy relief and is requesting that medication. Same was ordered.

## 2019-01-22 NOTE — PROGRESS NOTE ADULT - ASSESSMENT
53 female from assisted living with PMH of DM, HLD, Schizophrenia came with complaint of vomiting, difficult to arouse and sweating. Per EMS note and Assisted living papers , patient  has been noted to be slightly lethargic since few months now with poor po intake. No history of renal failure. Patient noted to have acute renal failure with Cr of 7.74 and BUN of 58.

## 2019-01-22 NOTE — PROGRESS NOTE ADULT - PROBLEM SELECTOR PLAN 1
- Cr of 7.74 and BUN of 58 on admission  - Unknown baseline  - Fena on admission was 4.9%; indicating post renal  - CT abdomen showed no hydronephrosis or kidney stones   - f/u US renal   - Avoid nephrotoxic medications   - Monitor BMP  - Nephro follow up - Cr of 7.74 and BUN of 58 on admission  - Unknown baseline; attempt made to contact the facility as per contact number in chart ([phone]) but ni reply   - Fena on admission was 4.9%; indicating post renal  - CT abdomen showed no hydronephrosis or kidney stones   - f/u US renal   - Avoid nephrotoxic medications   - Monitor BMP  - Nephro follow up

## 2019-01-23 DIAGNOSIS — F20.9 SCHIZOPHRENIA, UNSPECIFIED: ICD-10-CM

## 2019-01-23 LAB
ALBUMIN SERPL ELPH-MCNC: 3.2 G/DL — LOW (ref 3.5–5)
ALP SERPL-CCNC: 124 U/L — HIGH (ref 40–120)
ALT FLD-CCNC: 35 U/L DA — SIGNIFICANT CHANGE UP (ref 10–60)
ANION GAP SERPL CALC-SCNC: 11 MMOL/L — SIGNIFICANT CHANGE UP (ref 5–17)
ASO AB SER QL: 157 IU/ML — SIGNIFICANT CHANGE UP (ref 0–199)
AST SERPL-CCNC: 25 U/L — SIGNIFICANT CHANGE UP (ref 10–40)
BASOPHILS # BLD AUTO: 0.1 K/UL — SIGNIFICANT CHANGE UP (ref 0–0.2)
BASOPHILS NFR BLD AUTO: 1.9 % — SIGNIFICANT CHANGE UP (ref 0–2)
BILIRUB SERPL-MCNC: 0.2 MG/DL — SIGNIFICANT CHANGE UP (ref 0.2–1.2)
BUN SERPL-MCNC: 81 MG/DL — HIGH (ref 7–18)
C3 SERPL-MCNC: 116 MG/DL — SIGNIFICANT CHANGE UP (ref 81–157)
C4 SERPL-MCNC: 39 MG/DL — SIGNIFICANT CHANGE UP (ref 13–39)
CALCIUM SERPL-MCNC: 8.5 MG/DL — SIGNIFICANT CHANGE UP (ref 8.4–10.5)
CHLORIDE SERPL-SCNC: 109 MMOL/L — HIGH (ref 96–108)
CO2 SERPL-SCNC: 20 MMOL/L — LOW (ref 22–31)
CREAT SERPL-MCNC: 7.79 MG/DL — HIGH (ref 0.5–1.3)
EOSINOPHIL # BLD AUTO: 0.3 K/UL — SIGNIFICANT CHANGE UP (ref 0–0.5)
EOSINOPHIL NFR BLD AUTO: 4 % — SIGNIFICANT CHANGE UP (ref 0–6)
GGT SERPL-CCNC: 83 U/L — HIGH (ref 8–40)
GLUCOSE BLDC GLUCOMTR-MCNC: 122 MG/DL — HIGH (ref 70–99)
GLUCOSE BLDC GLUCOMTR-MCNC: 212 MG/DL — HIGH (ref 70–99)
GLUCOSE BLDC GLUCOMTR-MCNC: 246 MG/DL — HIGH (ref 70–99)
GLUCOSE BLDC GLUCOMTR-MCNC: 252 MG/DL — HIGH (ref 70–99)
GLUCOSE BLDC GLUCOMTR-MCNC: 269 MG/DL — HIGH (ref 70–99)
GLUCOSE SERPL-MCNC: 100 MG/DL — HIGH (ref 70–99)
HAV IGM SER-ACNC: SIGNIFICANT CHANGE UP
HBV CORE IGM SER-ACNC: SIGNIFICANT CHANGE UP
HBV SURFACE AG SER-ACNC: SIGNIFICANT CHANGE UP
HCT VFR BLD CALC: 22.9 % — LOW (ref 34.5–45)
HCV AB S/CO SERPL IA: 0.08 S/CO — SIGNIFICANT CHANGE UP
HCV AB SERPL-IMP: SIGNIFICANT CHANGE UP
HGB BLD-MCNC: 7.1 G/DL — LOW (ref 11.5–15.5)
HIV 1+2 AB+HIV1 P24 AG SERPL QL IA: SIGNIFICANT CHANGE UP
LYMPHOCYTES # BLD AUTO: 2.4 K/UL — SIGNIFICANT CHANGE UP (ref 1–3.3)
LYMPHOCYTES # BLD AUTO: 35.2 % — SIGNIFICANT CHANGE UP (ref 13–44)
MCHC RBC-ENTMCNC: 26.5 PG — LOW (ref 27–34)
MCHC RBC-ENTMCNC: 30.8 GM/DL — LOW (ref 32–36)
MCV RBC AUTO: 85.9 FL — SIGNIFICANT CHANGE UP (ref 80–100)
MONOCYTES # BLD AUTO: 0.6 K/UL — SIGNIFICANT CHANGE UP (ref 0–0.9)
MONOCYTES NFR BLD AUTO: 8.6 % — SIGNIFICANT CHANGE UP (ref 2–14)
NEUTROPHILS # BLD AUTO: 3.4 K/UL — SIGNIFICANT CHANGE UP (ref 1.8–7.4)
NEUTROPHILS NFR BLD AUTO: 50.4 % — SIGNIFICANT CHANGE UP (ref 43–77)
PLATELET # BLD AUTO: 216 K/UL — SIGNIFICANT CHANGE UP (ref 150–400)
POTASSIUM SERPL-MCNC: 4.2 MMOL/L — SIGNIFICANT CHANGE UP (ref 3.5–5.3)
POTASSIUM SERPL-SCNC: 4.2 MMOL/L — SIGNIFICANT CHANGE UP (ref 3.5–5.3)
PROT SERPL-MCNC: 7 G/DL — SIGNIFICANT CHANGE UP (ref 6–8.3)
RBC # BLD: 2.67 M/UL — LOW (ref 3.8–5.2)
RBC # FLD: 11.3 % — SIGNIFICANT CHANGE UP (ref 10.3–14.5)
RHEUMATOID FACT SERPL-ACNC: <10 IU/ML — SIGNIFICANT CHANGE UP (ref 0–13)
SODIUM SERPL-SCNC: 140 MMOL/L — SIGNIFICANT CHANGE UP (ref 135–145)
WBC # BLD: 6.8 K/UL — SIGNIFICANT CHANGE UP (ref 3.8–10.5)
WBC # FLD AUTO: 6.8 K/UL — SIGNIFICANT CHANGE UP (ref 3.8–10.5)

## 2019-01-23 RX ORDER — SODIUM CHLORIDE 9 MG/ML
1000 INJECTION INTRAMUSCULAR; INTRAVENOUS; SUBCUTANEOUS
Qty: 0 | Refills: 0 | Status: DISCONTINUED | OUTPATIENT
Start: 2019-01-23 | End: 2019-01-28

## 2019-01-23 RX ADMIN — Medication 0.5 MILLIGRAM(S): at 17:15

## 2019-01-23 RX ADMIN — Medication 0.5 MILLIGRAM(S): at 06:37

## 2019-01-23 RX ADMIN — Medication 1 SPRAY(S): at 06:37

## 2019-01-23 RX ADMIN — HEPARIN SODIUM 5000 UNIT(S): 5000 INJECTION INTRAVENOUS; SUBCUTANEOUS at 06:38

## 2019-01-23 RX ADMIN — AMLODIPINE BESYLATE 5 MILLIGRAM(S): 2.5 TABLET ORAL at 06:37

## 2019-01-23 RX ADMIN — HEPARIN SODIUM 5000 UNIT(S): 5000 INJECTION INTRAVENOUS; SUBCUTANEOUS at 22:46

## 2019-01-23 RX ADMIN — ERYTHROPOIETIN 6000 UNIT(S): 10000 INJECTION, SOLUTION INTRAVENOUS; SUBCUTANEOUS at 16:45

## 2019-01-23 RX ADMIN — Medication 81 MILLIGRAM(S): at 11:54

## 2019-01-23 RX ADMIN — Medication 2: at 11:54

## 2019-01-23 RX ADMIN — HEPARIN SODIUM 5000 UNIT(S): 5000 INJECTION INTRAVENOUS; SUBCUTANEOUS at 13:40

## 2019-01-23 RX ADMIN — Medication 3: at 22:46

## 2019-01-23 RX ADMIN — Medication 1 SPRAY(S): at 17:15

## 2019-01-23 RX ADMIN — SODIUM CHLORIDE 100 MILLILITER(S): 9 INJECTION INTRAMUSCULAR; INTRAVENOUS; SUBCUTANEOUS at 17:42

## 2019-01-23 RX ADMIN — Medication 25 MICROGRAM(S): at 06:37

## 2019-01-23 RX ADMIN — SIMVASTATIN 20 MILLIGRAM(S): 20 TABLET, FILM COATED ORAL at 22:47

## 2019-01-23 RX ADMIN — Medication 3: at 16:43

## 2019-01-23 NOTE — PROGRESS NOTE ADULT - SUBJECTIVE AND OBJECTIVE BOX
pt seen and examined.pts current chart reviewed and case discussed with resident covering.    SUBJECTIVE:  pt feels fine and denies cp,sob,gi or gu/uremic  symptons    REVIEW OF SYSTEMS:  CONSTITUTIONAL: No weakness, fevers or chills  EYES/ENT: No visual changes;  No vertigo or throat pain   NECK: No pain or stiffness  RESPIRATORY: No cough, wheezing, hemoptysis; No shortness of breath  CARDIOVASCULAR: No chest pain or palpitations  GASTROINTESTINAL: No abdominal or epigastric pain. No nausea, vomiting, or hematemesis; No diarrhea or constipation. No melena or hematochezia.  GENITOURINARY: No dysuria, frequency , hematuria, flank pain or nocturia  NEUROLOGICAL: No numbness or weakness  SKIN: No itching, burning, rashes, or lesions   All other review of systems is negative unless indicated above    Current meds:    amLODIPine   Tablet 5 milliGRAM(s) Oral daily  aspirin enteric coated 81 milliGRAM(s) Oral daily  benztropine 0.5 milliGRAM(s) Oral two times a day  dextrose 40% Gel 15 Gram(s) Oral once PRN  dextrose 5%. 1000 milliLiter(s) IV Continuous <Continuous>  dextrose 50% Injectable 12.5 Gram(s) IV Push once  dextrose 50% Injectable 25 Gram(s) IV Push once  dextrose 50% Injectable 25 Gram(s) IV Push once  epoetin seema Injectable 6000 Unit(s) SubCutaneous <User Schedule>  fluticasone propionate 50 MICROgram(s)/spray Nasal Spray 1 Spray(s) Both Nostrils two times a day  glucagon  Injectable 1 milliGRAM(s) IntraMuscular once PRN  heparin  Injectable 5000 Unit(s) SubCutaneous every 8 hours  insulin lispro (HumaLOG) corrective regimen sliding scale   SubCutaneous Before meals and at bedtime  levothyroxine 25 MICROGram(s) Oral daily  simvastatin 20 milliGRAM(s) Oral at bedtime  sodium chloride 0.9%. 1000 milliLiter(s) IV Continuous <Continuous>      Vital Signs    T(F): 98.7 (01-23-19 @ 15:51), Max: 98.7 (01-23-19 @ 15:51)  HR: 60 (01-23-19 @ 15:51) (55 - 84)  BP: 138/56 (01-23-19 @ 15:51) (118/53 - 143/70)  ABP: --  RR: 18 (01-23-19 @ 15:51) (16 - 20)  SpO2: 100% (01-23-19 @ 15:51) (95% - 100%)  Wt(kg): --  CVP(cm H2O): --  CO: --  PCWP: --    I and O's:    01-22 @ 07:01  -  01-23 @ 07:00  --------------------------------------------------------  IN:  Total IN: 0 mL    OUT:    Indwelling Catheter - Urethral: 4050 mL  Total OUT: 4050 mL    Total NET: -4050 mL      01-23 @ 07:01  -  01-23 @ 18:50  --------------------------------------------------------  IN:  Total IN: 0 mL    OUT:    Indwelling Catheter - Urethral: 1100 mL  Total OUT: 1100 mL    Total NET: -1100 mL        Daily     Daily     PHYSICAL EXAM:  Constitutional: well developed, well nourished  and in nad  HEENT: PERRLA,  no icteric sclera and mild pallor of conjunctiva noted  Neck: No JVD, thyromegaly or adenopathy  Respiratory: reduced air entry lower lungs with no rales, wheezing or rhonchi  Cardiovascular: S1 and S2 normally heard  Gastrointestinal: soft, nondistended, nontender and normal bowel sounds heard  Extremities: No peripheral edema or cyanosis  Neurological: A/O x 3, no focal deficits  : No flank or cva tenderness palpated.  Skin: No rashes      LABS:    CBC:                          7.1    6.8   )-----------( 216      ( 23 Jan 2019 07:17 )             22.9           BMP:    01-23    140  |  109<H>  |  81<H>  ----------------------------<  100<H>  4.2   |  20<L>  |  7.79<H>  01-22    142  |  111<H>  |  71<H>  ----------------------------<  94  4.0   |  20<L>  |  7.92<H>  01-21    145  |  112<H>  |  56<H>  ----------------------------<  83  3.8   |  22  |  7.39<H>    Ca    8.5      23 Jan 2019 07:17  Ca    8.6      22 Jan 2019 06:26  Ca    8.2<L>      21 Jan 2019 06:07  Phos  4.7     01-22  Mg     1.7     01-22    TPro  7.0  /  Alb  3.2<L>  /  TBili  0.2  /  DBili  x   /  AST  25  /  ALT  35  /  AlkPhos  124<H>  01-23  TPro  7.1  /  Alb  3.3<L>  /  TBili  0.2  /  DBili  x   /  AST  12  /  ALT  20  /  AlkPhos  96  01-22  TPro  6.6  /  Alb  3.1<L>  /  TBili  0.3  /  DBili  x   /  AST  10  /  ALT  19  /  AlkPhos  91  01-21      Intact PTH: 116 pg/mL (01-22 @ 09:19)      URINE STUDIES:        Sodium, Random Urine: 52 mmol/L (01-19 @ 21:58)  Creatinine, Random Urine: 60 mg/dL (01-19 @ 21:58)  Osmolality, Random Urine: 284 mos/kg (01-19 @ 21:58)                  RADIOLOGY & ADDITIONAL STUDIES: pt seen and examined.    SUBJECTIVE:  pt feels fine and denies cp,sob,gi or uremic  symptons  pt ha smild blood tinged urine via del cid cath today  pt was apparently had serum cr 1.04 in oct 2018  as per [facility] PCP   pt gives hx of dental work >2 months ago and she denies recent dental work up ,sorethroat,fever/chills or skin infection  pt also denies using any nephrotoxic meds like Nsaids or antibiotics recently   Pt needs full renal w/u for possible RPGN as d/w resident  pt is having good u/o ,infact 4 liters last 24 hrs ?    REVIEW OF SYSTEMS:  CONSTITUTIONAL: No weakness, fevers or chills  RESPIRATORY: No cough, wheezing, hemoptysis; No shortness of breath  CARDIOVASCULAR: No chest pain or palpitations  GASTROINTESTINAL: No abdominal or epigastric pain. No nausea, vomiting, or hematemesis; No diarrhea or constipation. No melena or hematochezia.  GENITOURINARY: No dysuria, frequency , hematuria, flank pain or nocturia  NEUROLOGICAL: No numbness or weakness  SKIN: No itching, burning, rashes, or lesions   All other review of systems is negative unless indicated above    Current meds:    amLODIPine   Tablet 5 milliGRAM(s) Oral daily  aspirin enteric coated 81 milliGRAM(s) Oral daily  benztropine 0.5 milliGRAM(s) Oral two times a day  dextrose 40% Gel 15 Gram(s) Oral once PRN  dextrose 5%. 1000 milliLiter(s) IV Continuous <Continuous>  dextrose 50% Injectable 12.5 Gram(s) IV Push once  dextrose 50% Injectable 25 Gram(s) IV Push once  dextrose 50% Injectable 25 Gram(s) IV Push once  epoetin seema Injectable 6000 Unit(s) SubCutaneous <User Schedule>  fluticasone propionate 50 MICROgram(s)/spray Nasal Spray 1 Spray(s) Both Nostrils two times a day  glucagon  Injectable 1 milliGRAM(s) IntraMuscular once PRN  heparin  Injectable 5000 Unit(s) SubCutaneous every 8 hours  insulin lispro (HumaLOG) corrective regimen sliding scale   SubCutaneous Before meals and at bedtime  levothyroxine 25 MICROGram(s) Oral daily  simvastatin 20 milliGRAM(s) Oral at bedtime  sodium chloride 0.9%. 1000 milliLiter(s) IV Continuous <Continuous>      Vital Signs    T(F): 98.7 (01-23-19 @ 15:51), Max: 98.7 (01-23-19 @ 15:51)  HR: 60 (01-23-19 @ 15:51) (55 - 84)  BP: 138/56 (01-23-19 @ 15:51) (118/53 - 143/70)  ABP: --  RR: 18 (01-23-19 @ 15:51) (16 - 20)  SpO2: 100% (01-23-19 @ 15:51) (95% - 100%)  Wt(kg): --  CVP(cm H2O): --  CO: --  PCWP: --    I and O's:    01-22 @ 07:01  -  01-23 @ 07:00  --------------------------------------------------------  IN:  Total IN: 0 mL    OUT:    Indwelling Catheter - Urethral: 4050 mL  Total OUT: 4050 mL    Total NET: -4050 mL      01-23 @ 07:01  -  01-23 @ 18:50  --------------------------------------------------------  IN:  Total IN: 0 mL    OUT:    Indwelling Catheter - Urethral: 1100 mL  Total OUT: 1100 mL    Total NET: -1100 mL        Daily     Daily     PHYSICAL EXAM:  Constitutional: well developed, obese  and in nad  HEENT: PERRLA,  no icteric sclera and mild pallor of conjunctiva noted  Neck: No JVD, thyromegaly or adenopathy  Respiratory: reduced air entry lower lungs with no rales, wheezing or rhonchi  Cardiovascular: S1 and S2 normally heard  Gastrointestinal: soft,obese, nondistended, nontender and normal bowel sounds heard  Extremities: No peripheral edema or cyanosis  Neurological: A/O x 3, no focal deficits  : No flank or cva tenderness palpated.  Skin: No rashes      LABS:    CBC:                          7.1    6.8   )-----------( 216      ( 23 Jan 2019 07:17 )             22.9     BMP:    01-23    140  |  109<H>  |  81<H>  ----------------------------<  100<H>  4.2   |  20<L>  |  7.79<H>  01-22    142  |  111<H>  |  71<H>  ----------------------------<  94  4.0   |  20<L>  |  7.92<H>  01-21    145  |  112<H>  |  56<H>  ----------------------------<  83  3.8   |  22  |  7.39<H>    Ca    8.5      23 Jan 2019 07:17  Ca    8.6      22 Jan 2019 06:26  Ca    8.2<L>      21 Jan 2019 06:07  Phos  4.7     01-22  Mg     1.7     01-22    TPro  7.0  /  Alb  3.2<L>  /  TBili  0.2  /  DBili  x   /  AST  25  /  ALT  35  /  AlkPhos  124<H>  01-23  TPro  7.1  /  Alb  3.3<L>  /  TBili  0.2  /  DBili  x   /  AST  12  /  ALT  20  /  AlkPhos  96  01-22  TPro  6.6  /  Alb  3.1<L>  /  TBili  0.3  /  DBili  x   /  AST  10  /  ALT  19  /  AlkPhos  91  01-21      Intact PTH: 116 pg/mL (01-22 @ 09:19)      URINE STUDIES:  Sodium, Random Urine: 52 mmol/L (01-19 @ 21:58)  Creatinine, Random Urine: 60 mg/dL (01-19 @ 21:58)  Osmolality, Random Urine: 284 mos/kg (01-19 @ 21:58)

## 2019-01-23 NOTE — PROGRESS NOTE ADULT - PROBLEM SELECTOR PLAN 1
- Cr of 7.74 and BUN of 58 on admission ; baseline back in Oct 2017 was Cr of 1.04 and BUN of 19; as per Dr. Vanessa Joe (PCP at the facility)  - Fena on admission was 4.9%; indicating post renal but no source noted on imaging. CT abdomen and US renal both unremarkable.   - will send autoimmune work up including KRYSTAL, ANCA, ASCA, GGT, Hepatitis panel, HIV, Complement levels, ASO, RF, Cryoglobulin, SPEP, UPEP, Urine bence stahl   - Patient might need kidney biopsy and dialysis if no improvement noted in Cr.   - Monitor BMP  - Avoid nephrotoxic medications   - Nephro follow up - Cr of 7.74 and BUN of 58 on admission ; baseline back in Oct 2018 was Cr of 1.04 and BUN of 19; as per Dr. Vanessa Joe (PCP at the facility)  - Fena on admission was 4.9%; indicating post renal but no source noted on imaging. CT abdomen and US renal both unremarkable.   - will send autoimmune work up including KRYSTAL, ANCA, ASCA, GGT, Hepatitis panel, HIV, Complement levels, ASO, RF, Cryoglobulin, SPEP, UPEP, Urine bence stahl   - Patient might need kidney biopsy and dialysis if no improvement noted in Cr.   - Monitor BMP  - Avoid nephrotoxic medications   - Nephro follow up

## 2019-01-23 NOTE — PROGRESS NOTE ADULT - SUBJECTIVE AND OBJECTIVE BOX
Resident Note discussed with  primary attending    Patient is a 53y old  Female who presents with a chief complaint of vomiting (22 Jan 2019 13:04)      INTERVAL HPI/ OVERNIGHT EVENTS: no new complaints. Renal US done and it was unremarkable. Patient evaluated by Psych and has capacity. Spoke to Dr. Vanessa Joe, who is PCP at the facility, baseline Cr from Oct 2017 was 1.04 and BUN of 19.       MEDICATIONS  (STANDING):  amLODIPine   Tablet 5 milliGRAM(s) Oral daily  aspirin enteric coated 81 milliGRAM(s) Oral daily  benztropine 0.5 milliGRAM(s) Oral two times a day  dextrose 5%. 1000 milliLiter(s) (50 mL/Hr) IV Continuous <Continuous>  dextrose 50% Injectable 12.5 Gram(s) IV Push once  dextrose 50% Injectable 25 Gram(s) IV Push once  dextrose 50% Injectable 25 Gram(s) IV Push once  epoetin seema Injectable 6000 Unit(s) SubCutaneous <User Schedule>  fluticasone propionate 50 MICROgram(s)/spray Nasal Spray 1 Spray(s) Both Nostrils two times a day  heparin  Injectable 5000 Unit(s) SubCutaneous every 8 hours  insulin lispro (HumaLOG) corrective regimen sliding scale   SubCutaneous Before meals and at bedtime  levothyroxine 25 MICROGram(s) Oral daily  simvastatin 20 milliGRAM(s) Oral at bedtime    MEDICATIONS  (PRN):  dextrose 40% Gel 15 Gram(s) Oral once PRN Blood Glucose LESS THAN 70 milliGRAM(s)/deciLiter  glucagon  Injectable 1 milliGRAM(s) IntraMuscular once PRN Glucose <70 milliGRAM(s)/deciLiter      __________________________________________________  REVIEW OF SYSTEMS:    CONSTITUTIONAL: No fever,   EYES: no acute visual disturbances  NECK: No pain or stiffness  RESPIRATORY: No cough; No shortness of breath  CARDIOVASCULAR: No chest pain, no palpitations  GASTROINTESTINAL: No pain. No nausea or vomiting; No diarrhea   NEUROLOGICAL: No headache or numbness, no tremors  MUSCULOSKELETAL: No joint pain, no muscle pain  GENITOURINARY: no dysuria, no frequency, no hesitancy  PSYCHIATRY: no depression , no anxiety  ALL OTHER  ROS negative        Vital Signs Last 24 Hrs  T(C): 36.6 (23 Jan 2019 07:42), Max: 36.6 (22 Jan 2019 16:03)  T(F): 97.9 (23 Jan 2019 07:42), Max: 97.9 (22 Jan 2019 16:03)  HR: 55 (23 Jan 2019 07:42) (55 - 84)  BP: 129/59 (23 Jan 2019 07:42) (118/53 - 143/70)  BP(mean): --  RR: 20 (23 Jan 2019 07:42) (16 - 20)  SpO2: 100% (23 Jan 2019 07:42) (95% - 100%)    ________________________________________________  PHYSICAL EXAM:  GENERAL: NAD  HEENT: Normocephalic;  conjunctivae and sclerae clear; moist mucous membranes;   NECK : supple  CHEST/LUNG: Clear to auscultation bilaterally with good air entry   HEART: S1 S2  regular; no murmurs, gallops or rubs  ABDOMEN: Soft, Nontender, Nondistended; Bowel sounds present  EXTREMITIES: no cyanosis; no edema; no calf tenderness  NERVOUS SYSTEM:  Awake and alert; Oriented  to place, person and time ; no new deficits    _________________________________________________  LABS:                        7.1    6.8   )-----------( 216      ( 23 Jan 2019 07:17 )             22.9     01-23    140  |  109<H>  |  81<H>  ----------------------------<  100<H>  4.2   |  20<L>  |  7.79<H>    Ca    8.5      23 Jan 2019 07:17  Phos  4.7     01-22  Mg     1.7     01-22    TPro  7.0  /  Alb  3.2<L>  /  TBili  0.2  /  DBili  x   /  AST  25  /  ALT  35  /  AlkPhos  124<H>  01-23        CAPILLARY BLOOD GLUCOSE      POCT Blood Glucose.: 122 mg/dL (23 Jan 2019 08:21)  POCT Blood Glucose.: 207 mg/dL (22 Jan 2019 21:12)  POCT Blood Glucose.: 201 mg/dL (22 Jan 2019 17:04)  POCT Blood Glucose.: 245 mg/dL (22 Jan 2019 11:53)        RADIOLOGY & ADDITIONAL TESTS:    Imaging Personally Reviewed:  YES    Consultant(s) Notes Reviewed:   YES    Care Discussed with Consultants : YES    Plan of care was discussed with patient and /or primary care giver; all questions and concerns were addressed and care was aligned with patient's wishes. Resident Note discussed with  primary attending    Patient is a 53y old  Female who presents with a chief complaint of vomiting (22 Jan 2019 13:04)      INTERVAL HPI/ OVERNIGHT EVENTS: no new complaints. Renal US done and it was unremarkable. Patient evaluated by Psych and has capacity. Spoke to Dr. Vanessa Joe, who is PCP at the facility, baseline Cr from Oct 2018 was 1.04 and BUN of 19.       MEDICATIONS  (STANDING):  amLODIPine   Tablet 5 milliGRAM(s) Oral daily  aspirin enteric coated 81 milliGRAM(s) Oral daily  benztropine 0.5 milliGRAM(s) Oral two times a day  dextrose 5%. 1000 milliLiter(s) (50 mL/Hr) IV Continuous <Continuous>  dextrose 50% Injectable 12.5 Gram(s) IV Push once  dextrose 50% Injectable 25 Gram(s) IV Push once  dextrose 50% Injectable 25 Gram(s) IV Push once  epoetin seema Injectable 6000 Unit(s) SubCutaneous <User Schedule>  fluticasone propionate 50 MICROgram(s)/spray Nasal Spray 1 Spray(s) Both Nostrils two times a day  heparin  Injectable 5000 Unit(s) SubCutaneous every 8 hours  insulin lispro (HumaLOG) corrective regimen sliding scale   SubCutaneous Before meals and at bedtime  levothyroxine 25 MICROGram(s) Oral daily  simvastatin 20 milliGRAM(s) Oral at bedtime    MEDICATIONS  (PRN):  dextrose 40% Gel 15 Gram(s) Oral once PRN Blood Glucose LESS THAN 70 milliGRAM(s)/deciLiter  glucagon  Injectable 1 milliGRAM(s) IntraMuscular once PRN Glucose <70 milliGRAM(s)/deciLiter      __________________________________________________  REVIEW OF SYSTEMS:    CONSTITUTIONAL: No fever,   EYES: no acute visual disturbances  NECK: No pain or stiffness  RESPIRATORY: No cough; No shortness of breath  CARDIOVASCULAR: No chest pain, no palpitations  GASTROINTESTINAL: No pain. No nausea or vomiting; No diarrhea   NEUROLOGICAL: No headache or numbness, no tremors  MUSCULOSKELETAL: No joint pain, no muscle pain  GENITOURINARY: no dysuria, no frequency, no hesitancy  PSYCHIATRY: no depression , no anxiety  ALL OTHER  ROS negative        Vital Signs Last 24 Hrs  T(C): 36.6 (23 Jan 2019 07:42), Max: 36.6 (22 Jan 2019 16:03)  T(F): 97.9 (23 Jan 2019 07:42), Max: 97.9 (22 Jan 2019 16:03)  HR: 55 (23 Jan 2019 07:42) (55 - 84)  BP: 129/59 (23 Jan 2019 07:42) (118/53 - 143/70)  BP(mean): --  RR: 20 (23 Jan 2019 07:42) (16 - 20)  SpO2: 100% (23 Jan 2019 07:42) (95% - 100%)    ________________________________________________  PHYSICAL EXAM:  GENERAL: NAD  HEENT: Normocephalic;  conjunctivae and sclerae clear; moist mucous membranes;   NECK : supple  CHEST/LUNG: Clear to auscultation bilaterally with good air entry   HEART: S1 S2  regular; no murmurs, gallops or rubs  ABDOMEN: Soft, Nontender, Nondistended; Bowel sounds present  EXTREMITIES: no cyanosis; no edema; no calf tenderness  NERVOUS SYSTEM:  Awake and alert; Oriented  to place, person and time ; no new deficits    _________________________________________________  LABS:                        7.1    6.8   )-----------( 216      ( 23 Jan 2019 07:17 )             22.9     01-23    140  |  109<H>  |  81<H>  ----------------------------<  100<H>  4.2   |  20<L>  |  7.79<H>    Ca    8.5      23 Jan 2019 07:17  Phos  4.7     01-22  Mg     1.7     01-22    TPro  7.0  /  Alb  3.2<L>  /  TBili  0.2  /  DBili  x   /  AST  25  /  ALT  35  /  AlkPhos  124<H>  01-23        CAPILLARY BLOOD GLUCOSE      POCT Blood Glucose.: 122 mg/dL (23 Jan 2019 08:21)  POCT Blood Glucose.: 207 mg/dL (22 Jan 2019 21:12)  POCT Blood Glucose.: 201 mg/dL (22 Jan 2019 17:04)  POCT Blood Glucose.: 245 mg/dL (22 Jan 2019 11:53)        RADIOLOGY & ADDITIONAL TESTS:    Imaging Personally Reviewed:  YES    Consultant(s) Notes Reviewed:   YES    Care Discussed with Consultants : YES    Plan of care was discussed with patient and /or primary care giver; all questions and concerns were addressed and care was aligned with patient's wishes.

## 2019-01-23 NOTE — PROGRESS NOTE ADULT - ASSESSMENT
ASSESSMENT AND PLAN:  1. Impaired renal function appears chronic, however there might be acute component due to volume depletion (vomiting). There is no indication for  HD at this time  -pts egfr is still mildly worsening despite good u/o   2. 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  -f/u bmp daily  -Keep patient euvolemic and renal diet  Avoid Nephrtoxic Meds/ Agents such as NSAIDs, Gadolinium contrast, Phosphate containing enemas, etc..)  Adjust Medications according to eGFR  -pt may need dialysis if renal function remains poor next few days  3. 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 DEISY : Etiology unclear as pt had near normal cr of 1.04 in oct 2018 , now ct of 7 to 7.9  DD include;ATN (most likely) ,AIN or RPGN (less likley as pts ua is benign on admission with mild ppriteinuria)  -pts egfr is stable today  with  good u/o   -renal w/u with ANCAs,KRYSTAL,RO,C3/C4,HIV,HEPATITIS PROFILE,UPEP,SPEP,URINE FOR BJP ,,etc  -may need renal biopsy pending serology   -no need for hd at this time  -may add iv saline as pt has high u/o ?diuretic phase of DEISY ?  2. 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  -f/u bmp daily  -check CPK to r/o Rhabdomyolysis as pt was on Zocor at adult home  -Keep patient euvolemic and renal diet  Avoid Nephrtoxic Meds/ Agents such as NSAIDs, Gadolinium contrast, Phosphate containing enemas, etc..)  Adjust Medications according to eGFR  3. Anemia possible due to CKD  -anemia w/u   -continue  epogen for anemia of ckd  -f/u Hb daily  4.Htn:bp is acceptble  5.Metabolic acidosis:mild , secondary to ckd  -f/u co2 daily  6.MBD: secondary to deisy on ckd  -pt has acceptble phos level and acceptble pth level-no intervention needed at this time

## 2019-01-23 NOTE — PROGRESS NOTE ADULT - ASSESSMENT
53 female from assisted living with PMH of DM, HLD, Schizophrenia came with complaint of vomiting, difficult to arouse and sweating. Per EMS note and Assisted living papers , patient  has been noted to be slightly lethargic since few months now with poor po intake. No history of renal failure. Patient noted to have acute renal failure with Cr of 7.74 and BUN of 58. Admitted for further evaluation and management.

## 2019-01-24 LAB
% ALBUMIN: 56 % — SIGNIFICANT CHANGE UP
% ALPHA 1: 4.7 % — SIGNIFICANT CHANGE UP
% ALPHA 2: 10.5 % — SIGNIFICANT CHANGE UP
% BETA: 11.3 % — SIGNIFICANT CHANGE UP
% GAMMA: 17.5 % — SIGNIFICANT CHANGE UP
ALBUMIN SERPL ELPH-MCNC: 3.3 G/DL — LOW (ref 3.5–5)
ALBUMIN SERPL ELPH-MCNC: 3.9 G/DL — SIGNIFICANT CHANGE UP (ref 3.6–5.5)
ALBUMIN/GLOB SERPL ELPH: 1.3 RATIO — SIGNIFICANT CHANGE UP
ALP SERPL-CCNC: 142 U/L — HIGH (ref 40–120)
ALPHA1 GLOB SERPL ELPH-MCNC: 0.3 G/DL — SIGNIFICANT CHANGE UP (ref 0.1–0.4)
ALPHA2 GLOB SERPL ELPH-MCNC: 0.7 G/DL — SIGNIFICANT CHANGE UP (ref 0.5–1)
ALT FLD-CCNC: 43 U/L DA — SIGNIFICANT CHANGE UP (ref 10–60)
ANA TITR SER: NEGATIVE — SIGNIFICANT CHANGE UP
ANION GAP SERPL CALC-SCNC: 12 MMOL/L — SIGNIFICANT CHANGE UP (ref 5–17)
AST SERPL-CCNC: 23 U/L — SIGNIFICANT CHANGE UP (ref 10–40)
AUTO DIFF PNL BLD: NEGATIVE — SIGNIFICANT CHANGE UP
B-GLOBULIN SERPL ELPH-MCNC: 0.8 G/DL — SIGNIFICANT CHANGE UP (ref 0.5–1)
BAKER'S YEAST IGA QN IA: 7.1 UNITS — SIGNIFICANT CHANGE UP
BAKER'S YEAST IGA QN IA: NEGATIVE — SIGNIFICANT CHANGE UP
BAKER'S YEAST IGG QN IA: 7.6 UNITS — SIGNIFICANT CHANGE UP
BAKER'S YEAST IGG QN IA: NEGATIVE — SIGNIFICANT CHANGE UP
BASOPHILS # BLD AUTO: 0.1 K/UL — SIGNIFICANT CHANGE UP (ref 0–0.2)
BASOPHILS NFR BLD AUTO: 1.7 % — SIGNIFICANT CHANGE UP (ref 0–2)
BILIRUB SERPL-MCNC: 0.2 MG/DL — SIGNIFICANT CHANGE UP (ref 0.2–1.2)
BUN SERPL-MCNC: 85 MG/DL — HIGH (ref 7–18)
C-ANCA SER-ACNC: NEGATIVE — SIGNIFICANT CHANGE UP
CALCIUM SERPL-MCNC: 8.8 MG/DL — SIGNIFICANT CHANGE UP (ref 8.4–10.5)
CHLORIDE SERPL-SCNC: 111 MMOL/L — HIGH (ref 96–108)
CO2 SERPL-SCNC: 18 MMOL/L — LOW (ref 22–31)
CREAT SERPL-MCNC: 7.97 MG/DL — HIGH (ref 0.5–1.3)
CREATININE, URINE RESULT: 27 MG/DL — SIGNIFICANT CHANGE UP
EOSINOPHIL # BLD AUTO: 0.3 K/UL — SIGNIFICANT CHANGE UP (ref 0–0.5)
EOSINOPHIL NFR BLD AUTO: 3.7 % — SIGNIFICANT CHANGE UP (ref 0–6)
GAMMA GLOBULIN: 1.2 G/DL — SIGNIFICANT CHANGE UP (ref 0.6–1.6)
GLUCOSE BLDC GLUCOMTR-MCNC: 125 MG/DL — HIGH (ref 70–99)
GLUCOSE BLDC GLUCOMTR-MCNC: 224 MG/DL — HIGH (ref 70–99)
GLUCOSE BLDC GLUCOMTR-MCNC: 283 MG/DL — HIGH (ref 70–99)
GLUCOSE BLDC GLUCOMTR-MCNC: 289 MG/DL — HIGH (ref 70–99)
GLUCOSE SERPL-MCNC: 102 MG/DL — HIGH (ref 70–99)
HCT VFR BLD CALC: 22.2 % — LOW (ref 34.5–45)
HGB BLD-MCNC: 7 G/DL — CRITICAL LOW (ref 11.5–15.5)
INTERPRETATION SERPL IFE-IMP: SIGNIFICANT CHANGE UP
LYMPHOCYTES # BLD AUTO: 2 K/UL — SIGNIFICANT CHANGE UP (ref 1–3.3)
LYMPHOCYTES # BLD AUTO: 29.5 % — SIGNIFICANT CHANGE UP (ref 13–44)
MCHC RBC-ENTMCNC: 26.6 PG — LOW (ref 27–34)
MCHC RBC-ENTMCNC: 31.4 GM/DL — LOW (ref 32–36)
MCV RBC AUTO: 84.5 FL — SIGNIFICANT CHANGE UP (ref 80–100)
MONOCYTES # BLD AUTO: 0.6 K/UL — SIGNIFICANT CHANGE UP (ref 0–0.9)
MONOCYTES NFR BLD AUTO: 8.9 % — SIGNIFICANT CHANGE UP (ref 2–14)
NEUTROPHILS # BLD AUTO: 3.9 K/UL — SIGNIFICANT CHANGE UP (ref 1.8–7.4)
NEUTROPHILS NFR BLD AUTO: 56.2 % — SIGNIFICANT CHANGE UP (ref 43–77)
P-ANCA SER-ACNC: NEGATIVE — SIGNIFICANT CHANGE UP
PLATELET # BLD AUTO: 223 K/UL — SIGNIFICANT CHANGE UP (ref 150–400)
POTASSIUM SERPL-MCNC: 4.3 MMOL/L — SIGNIFICANT CHANGE UP (ref 3.5–5.3)
POTASSIUM SERPL-SCNC: 4.3 MMOL/L — SIGNIFICANT CHANGE UP (ref 3.5–5.3)
PROT ?TM UR-MCNC: 44 MG/DL — HIGH (ref 0–12)
PROT PATTERN SERPL ELPH-IMP: SIGNIFICANT CHANGE UP
PROT SERPL-MCNC: 7.2 G/DL — SIGNIFICANT CHANGE UP (ref 6–8.3)
RBC # BLD: 2.63 M/UL — LOW (ref 3.8–5.2)
RBC # FLD: 11.4 % — SIGNIFICANT CHANGE UP (ref 10.3–14.5)
SODIUM SERPL-SCNC: 141 MMOL/L — SIGNIFICANT CHANGE UP (ref 135–145)
WBC # BLD: 6.9 K/UL — SIGNIFICANT CHANGE UP (ref 3.8–10.5)
WBC # FLD AUTO: 6.9 K/UL — SIGNIFICANT CHANGE UP (ref 3.8–10.5)

## 2019-01-24 RX ORDER — CALCIUM ACETATE 667 MG
667 TABLET ORAL
Qty: 0 | Refills: 0 | Status: DISCONTINUED | OUTPATIENT
Start: 2019-01-24 | End: 2019-02-04

## 2019-01-24 RX ORDER — IRON SUCROSE 20 MG/ML
100 INJECTION, SOLUTION INTRAVENOUS EVERY 24 HOURS
Qty: 0 | Refills: 0 | Status: COMPLETED | OUTPATIENT
Start: 2019-01-24 | End: 2019-01-26

## 2019-01-24 RX ADMIN — Medication 1 SPRAY(S): at 06:38

## 2019-01-24 RX ADMIN — SIMVASTATIN 20 MILLIGRAM(S): 20 TABLET, FILM COATED ORAL at 22:15

## 2019-01-24 RX ADMIN — Medication 1 SPRAY(S): at 17:34

## 2019-01-24 RX ADMIN — AMLODIPINE BESYLATE 5 MILLIGRAM(S): 2.5 TABLET ORAL at 06:38

## 2019-01-24 RX ADMIN — IRON SUCROSE 210 MILLIGRAM(S): 20 INJECTION, SOLUTION INTRAVENOUS at 10:52

## 2019-01-24 RX ADMIN — Medication 3: at 12:34

## 2019-01-24 RX ADMIN — Medication 0.5 MILLIGRAM(S): at 06:38

## 2019-01-24 RX ADMIN — Medication 25 MICROGRAM(S): at 06:38

## 2019-01-24 RX ADMIN — SODIUM CHLORIDE 100 MILLILITER(S): 9 INJECTION INTRAMUSCULAR; INTRAVENOUS; SUBCUTANEOUS at 17:35

## 2019-01-24 RX ADMIN — HEPARIN SODIUM 5000 UNIT(S): 5000 INJECTION INTRAVENOUS; SUBCUTANEOUS at 06:38

## 2019-01-24 RX ADMIN — HEPARIN SODIUM 5000 UNIT(S): 5000 INJECTION INTRAVENOUS; SUBCUTANEOUS at 13:51

## 2019-01-24 RX ADMIN — Medication 2: at 17:34

## 2019-01-24 RX ADMIN — Medication 667 MILLIGRAM(S): at 22:15

## 2019-01-24 RX ADMIN — Medication 0.5 MILLIGRAM(S): at 17:34

## 2019-01-24 RX ADMIN — Medication 3: at 22:15

## 2019-01-24 RX ADMIN — SODIUM CHLORIDE 100 MILLILITER(S): 9 INJECTION INTRAMUSCULAR; INTRAVENOUS; SUBCUTANEOUS at 10:52

## 2019-01-24 RX ADMIN — HEPARIN SODIUM 5000 UNIT(S): 5000 INJECTION INTRAVENOUS; SUBCUTANEOUS at 22:15

## 2019-01-24 RX ADMIN — Medication 81 MILLIGRAM(S): at 11:00

## 2019-01-24 NOTE — PROGRESS NOTE ADULT - SUBJECTIVE AND OBJECTIVE BOX
Resident Note discussed with  primary attending    Patient is a 53y old  Female who presents with a chief complaint of vomiting (23 Jan 2019 18:50)      INTERVAL HPI/OVERNIGHT EVENTS: no new complaints    MEDICATIONS  (STANDING):  amLODIPine   Tablet 5 milliGRAM(s) Oral daily  aspirin enteric coated 81 milliGRAM(s) Oral daily  benztropine 0.5 milliGRAM(s) Oral two times a day  dextrose 5%. 1000 milliLiter(s) (50 mL/Hr) IV Continuous <Continuous>  dextrose 50% Injectable 12.5 Gram(s) IV Push once  dextrose 50% Injectable 25 Gram(s) IV Push once  dextrose 50% Injectable 25 Gram(s) IV Push once  epoetin seema Injectable 6000 Unit(s) SubCutaneous <User Schedule>  fluticasone propionate 50 MICROgram(s)/spray Nasal Spray 1 Spray(s) Both Nostrils two times a day  heparin  Injectable 5000 Unit(s) SubCutaneous every 8 hours  insulin lispro (HumaLOG) corrective regimen sliding scale   SubCutaneous Before meals and at bedtime  iron sucrose IVPB 100 milliGRAM(s) IV Intermittent every 24 hours  levothyroxine 25 MICROGram(s) Oral daily  simvastatin 20 milliGRAM(s) Oral at bedtime  sodium chloride 0.9%. 1000 milliLiter(s) (100 mL/Hr) IV Continuous <Continuous>    MEDICATIONS  (PRN):  dextrose 40% Gel 15 Gram(s) Oral once PRN Blood Glucose LESS THAN 70 milliGRAM(s)/deciLiter  glucagon  Injectable 1 milliGRAM(s) IntraMuscular once PRN Glucose <70 milliGRAM(s)/deciLiter      __________________________________________________  REVIEW OF SYSTEMS:    CONSTITUTIONAL: No fever,   EYES: no acute visual disturbances  NECK: No pain or stiffness  RESPIRATORY: No cough; No shortness of breath  CARDIOVASCULAR: No chest pain, no palpitations  GASTROINTESTINAL: No pain. No nausea or vomiting; No diarrhea   NEUROLOGICAL: No headache or numbness, no tremors  MUSCULOSKELETAL: No joint pain, no muscle pain  GENITOURINARY: no dysuria, no frequency, no hesitancy  PSYCHIATRY: no depression , no anxiety  ALL OTHER  ROS negative        Vital Signs Last 24 Hrs  T(C): 36.8 (24 Jan 2019 08:06), Max: 37.1 (23 Jan 2019 15:51)  T(F): 98.2 (24 Jan 2019 08:06), Max: 98.7 (23 Jan 2019 15:51)  HR: 63 (24 Jan 2019 08:06) (60 - 80)  BP: 112/39 (24 Jan 2019 08:06) (112/39 - 138/56)  BP(mean): --  RR: 18 (24 Jan 2019 08:06) (18 - 18)  SpO2: 100% (24 Jan 2019 08:06) (99% - 100%)    ________________________________________________  PHYSICAL EXAM:  GENERAL: NAD  HEENT:Normocephalic;  conjunctivae and sclerae clear; moist mucous membranes;   NECK : supple  CHEST/LUNG: Clear to auscuitation bilaterally with good air entry   HEART: S1 S2  regular; no murmurs, gallops or rubs  ABDOMEN: Soft, Nontender, Nondistended; Bowel sounds present  EXTREMITIES: no cyanosis; no edema; no calf tenderness  NERVOUS SYSTEM:  Awake and alert; Oriented  to place, person and time ; no new deficits    _________________________________________________  LABS:                        7.0    6.9   )-----------( 223      ( 24 Jan 2019 07:43 )             22.2     01-24    141  |  111<H>  |  85<H>  ----------------------------<  102<H>  4.3   |  18<L>  |  7.97<H>    Ca    8.8      24 Jan 2019 07:43    TPro  7.2  /  Alb  3.3<L>  /  TBili  0.2  /  DBili  x   /  AST  23  /  ALT  43  /  AlkPhos  142<H>  01-24        CAPILLARY BLOOD GLUCOSE      POCT Blood Glucose.: 125 mg/dL (24 Jan 2019 08:25)  POCT Blood Glucose.: 252 mg/dL (23 Jan 2019 22:43)  POCT Blood Glucose.: 246 mg/dL (23 Jan 2019 21:35)  POCT Blood Glucose.: 269 mg/dL (23 Jan 2019 16:13)  POCT Blood Glucose.: 212 mg/dL (23 Jan 2019 11:52)        Consultant(s) Notes Reviewed:   YES    Care Discussed with Consultants : YES    Plan of care was discussed with patient and /or primary care giver; all questions and concerns were addressed and care was aligned with patient's wishes.

## 2019-01-24 NOTE — PROGRESS NOTE ADULT - ASSESSMENT
ASSESSMENT AND PLAN:  1. DEISY : Etiology unclear as pt had near normal cr of 1.04 in oct 2018 , now ct of 7 to 7.9  DD include;ATN (most likely) ,AIN or RPGN (less likley as pts ua is benign on admission with mild ppriteinuria)  -pts egfr is stable today  with  good u/o   -renal w/u with ANCAs,KRYSTAL,RO,C3/C4,HIV,HEPATITIS PROFILE,UPEP,SPEP,URINE FOR BJP ,,etc  -may need renal biopsy pending serology   -no need for hd at this time  -may add iv saline as pt has high u/o ?diuretic phase of DEISY ?  2. 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  -f/u bmp daily  -check CPK to r/o Rhabdomyolysis as pt was on Zocor at adult home  -Keep patient euvolemic and renal diet  Avoid Nephrtoxic Meds/ Agents such as NSAIDs, Gadolinium contrast, Phosphate containing enemas, etc..)  Adjust Medications according to eGFR  3. Anemia possible due to CKD  -anemia w/u   -continue  epogen for anemia of ckd  -f/u Hb daily  4.Htn:bp is acceptble  5.Metabolic acidosis:mild , secondary to ckd  -f/u co2 daily  6.MBD: secondary to deisy on ckd  -pt has acceptble phos level and acceptble pth level-no intervention needed at this time ASSESSMENT AND PLAN:  1. DEISY : Etiology unclear as pt had near normal cr of 1.04 in oct 2018 , now ct of 7 to 7.9  DD include;ATN (most likely) ,AIN or RPGN (less likley as pts ua is benign on admission with mild ppriteinuria)  -pts egfr is poor and not getting better with iv hydration , non oliguric  -may need renal biopsy pending serology   -pt will need dialysis x few times prior to renal biospy as pt will be high risk for bleeding if pt is not dialysed prior to biopsy  -d/t pt/pts family(sister) regarding dialysis and pt/pts sister agree for hd and renal biopsy if needed next week  -continue iv saline x 24 hrs  2. 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  -f/u bmp daily  -check CPK to r/o Rhabdomyolysis as pt was on Zocor at adult home  -Keep patient euvolemic and renal diet  Avoid Nephrtoxic Meds/ Agents such as NSAIDs, Gadolinium contrast, Phosphate containing enemas, etc..)  Adjust Medications according to eGFR  3. Anemia possible due to CKD  -anemia w/u   -continue  epogen for anemia of ckd and add iv venofer  -f/u Hb daily  4.Htn:bp is acceptble  5.Metabolic acidosis :mild , secondary to ckd  -f/u co2 daily  -add phoslo  6.MBD: secondary to deisy on ckd  -pt s  phos level  is high now  -add phoslo and pt has acceptble pth level-no intervention needed at this time

## 2019-01-24 NOTE — PROGRESS NOTE ADULT - SUBJECTIVE AND OBJECTIVE BOX
pt seen and examined.pts current chart reviewed and case discussed with resident covering.    SUBJECTIVE:  pt feels fine and denies cp,sob,gi or gu/uremic  symptons    REVIEW OF SYSTEMS:  CONSTITUTIONAL: No weakness, fevers or chills  EYES/ENT: No visual changes;  No vertigo or throat pain   NECK: No pain or stiffness  RESPIRATORY: No cough, wheezing, hemoptysis; No shortness of breath  CARDIOVASCULAR: No chest pain or palpitations  GASTROINTESTINAL: No abdominal or epigastric pain. No nausea, vomiting, or hematemesis; No diarrhea or constipation. No melena or hematochezia.  GENITOURINARY: No dysuria, frequency , hematuria, flank pain or nocturia  NEUROLOGICAL: No numbness or weakness  SKIN: No itching, burning, rashes, or lesions   All other review of systems is negative unless indicated above    Current meds:    amLODIPine   Tablet 5 milliGRAM(s) Oral daily  aspirin enteric coated 81 milliGRAM(s) Oral daily  benztropine 0.5 milliGRAM(s) Oral two times a day  dextrose 40% Gel 15 Gram(s) Oral once PRN  dextrose 5%. 1000 milliLiter(s) IV Continuous <Continuous>  dextrose 50% Injectable 12.5 Gram(s) IV Push once  dextrose 50% Injectable 25 Gram(s) IV Push once  dextrose 50% Injectable 25 Gram(s) IV Push once  epoetin seema Injectable 6000 Unit(s) SubCutaneous <User Schedule>  fluticasone propionate 50 MICROgram(s)/spray Nasal Spray 1 Spray(s) Both Nostrils two times a day  glucagon  Injectable 1 milliGRAM(s) IntraMuscular once PRN  heparin  Injectable 5000 Unit(s) SubCutaneous every 8 hours  insulin lispro (HumaLOG) corrective regimen sliding scale   SubCutaneous Before meals and at bedtime  iron sucrose IVPB 100 milliGRAM(s) IV Intermittent every 24 hours  levothyroxine 25 MICROGram(s) Oral daily  simvastatin 20 milliGRAM(s) Oral at bedtime  sodium chloride 0.9%. 1000 milliLiter(s) IV Continuous <Continuous>      Vital Signs    T(F): 98.6 (01-24-19 @ 15:46), Max: 98.6 (01-23-19 @ 23:30)  HR: 59 (01-24-19 @ 15:46) (59 - 80)  BP: 129/54 (01-24-19 @ 15:46) (112/39 - 135/71)  ABP: --  RR: 18 (01-24-19 @ 15:46) (18 - 18)  SpO2: 100% (01-24-19 @ 15:46) (99% - 100%)  Wt(kg): --  CVP(cm H2O): --  CO: --  PCWP: --    I and O's:    01-22 @ 07:01  -  01-23 @ 07:00  --------------------------------------------------------  IN:  Total IN: 0 mL    OUT:    Indwelling Catheter - Urethral: 4050 mL  Total OUT: 4050 mL    Total NET: -4050 mL      01-23 @ 07:01  -  01-24 @ 07:00  --------------------------------------------------------  IN:  Total IN: 0 mL    OUT:    Indwelling Catheter - Urethral: 2600 mL  Total OUT: 2600 mL    Total NET: -2600 mL      01-24 @ 07:01  -  01-24 @ 16:58  --------------------------------------------------------  IN:  Total IN: 0 mL    OUT:    Indwelling Catheter - Urethral: 1350 mL  Total OUT: 1350 mL    Total NET: -1350 mL        Daily     Daily     PHYSICAL EXAM:  Constitutional: well developed, well nourished  and in nad  HEENT: PERRLA,  no icteric sclera and mild pallor of conjunctiva noted  Neck: No JVD, thyromegaly or adenopathy  Respiratory: reduced air entry lower lungs with no rales, wheezing or rhonchi  Cardiovascular: S1 and S2 normally heard  Gastrointestinal: soft, nondistended, nontender and normal bowel sounds heard  Extremities: No peripheral edema or cyanosis  Neurological: A/O x 3, no focal deficits  : No flank or cva tenderness palpated.  Skin: No rashes      LABS:    CBC:                          7.0    6.9   )-----------( 223      ( 24 Jan 2019 07:43 )             22.2           BMP:    01-24    141  |  111<H>  |  85<H>  ----------------------------<  102<H>  4.3   |  18<L>  |  7.97<H>  01-23    140  |  109<H>  |  81<H>  ----------------------------<  100<H>  4.2   |  20<L>  |  7.79<H>  01-22    142  |  111<H>  |  71<H>  ----------------------------<  94  4.0   |  20<L>  |  7.92<H>    Ca    8.8      24 Jan 2019 07:43  Ca    8.5      23 Jan 2019 07:17  Ca    8.6      22 Jan 2019 06:26  Phos  5.6     01-24  Phos  4.7     01-22  Mg     1.7     01-22    TPro  7.2  /  Alb  3.3<L>  /  TBili  0.2  /  DBili  x   /  AST  23  /  ALT  43  /  AlkPhos  142<H>  01-24  TPro  7.0  /  Alb  x   /  TBili  x   /  DBili  x   /  AST  x   /  ALT  x   /  AlkPhos  x   01-23  TPro  7.0  /  Alb  3.2<L>  /  TBili  0.2  /  DBili  x   /  AST  25  /  ALT  35  /  AlkPhos  124<H>  01-23  TPro  7.1  /  Alb  3.3<L>  /  TBili  0.2  /  DBili  x   /  AST  12  /  ALT  20  /  AlkPhos  96  01-22      Intact PTH: 116 pg/mL (01-22 @ 09:19)      URINE STUDIES:        Sodium, Random Urine: 52 mmol/L (01-19 @ 21:58)  Creatinine, Random Urine: 60 mg/dL (01-19 @ 21:58)  Osmolality, Random Urine: 284 mos/kg (01-19 @ 21:58)                  RADIOLOGY & ADDITIONAL STUDIES: pt seen , examined and case discussed with resident covering.    SUBJECTIVE:  pt feels fine and denies cp,sob,gi or uremic  symptons  pt is having less bloody urine via del cid cath       Current meds:    amLODIPine   Tablet 5 milliGRAM(s) Oral daily  aspirin enteric coated 81 milliGRAM(s) Oral daily  benztropine 0.5 milliGRAM(s) Oral two times a day  dextrose 40% Gel 15 Gram(s) Oral once PRN  dextrose 5%. 1000 milliLiter(s) IV Continuous <Continuous>  dextrose 50% Injectable 12.5 Gram(s) IV Push once  dextrose 50% Injectable 25 Gram(s) IV Push once  dextrose 50% Injectable 25 Gram(s) IV Push once  epoetin seema Injectable 6000 Unit(s) SubCutaneous <User Schedule>  fluticasone propionate 50 MICROgram(s)/spray Nasal Spray 1 Spray(s) Both Nostrils two times a day  glucagon  Injectable 1 milliGRAM(s) IntraMuscular once PRN  heparin  Injectable 5000 Unit(s) SubCutaneous every 8 hours  insulin lispro (HumaLOG) corrective regimen sliding scale   SubCutaneous Before meals and at bedtime  iron sucrose IVPB 100 milliGRAM(s) IV Intermittent every 24 hours  levothyroxine 25 MICROGram(s) Oral daily  simvastatin 20 milliGRAM(s) Oral at bedtime  sodium chloride 0.9%. 1000 milliLiter(s) IV Continuous <Continuous>      Vital Signs    T(F): 98.6 (01-24-19 @ 15:46), Max: 98.6 (01-23-19 @ 23:30)  HR: 59 (01-24-19 @ 15:46) (59 - 80)  BP: 129/54 (01-24-19 @ 15:46) (112/39 - 135/71)  ABP: --  RR: 18 (01-24-19 @ 15:46) (18 - 18)  SpO2: 100% (01-24-19 @ 15:46) (99% - 100%)  Wt(kg): --  CVP(cm H2O): --  CO: --  PCWP: --    I and O's:    01-22 @ 07:01  -  01-23 @ 07:00  --------------------------------------------------------  IN:  Total IN: 0 mL    OUT:    Indwelling Catheter - Urethral: 4050 mL  Total OUT: 4050 mL    Total NET: -4050 mL      01-23 @ 07:01 - 01-24 @ 07:00  --------------------------------------------------------  IN:  Total IN: 0 mL    OUT:    Indwelling Catheter - Urethral: 2600 mL  Total OUT: 2600 mL    Total NET: -2600 mL      01-24 @ 07:01 - 01-24 @ 16:58  --------------------------------------------------------  IN:  Total IN: 0 mL    OUT:    Indwelling Catheter - Urethral: 1350 mL  Total OUT: 1350 mL    Total NET: -1350 mL        Daily     Daily     PHYSICAL EXAM:  Constitutional: well developed, obese  and in nad  HEENT: PERRLA,  no icteric sclera and mild pallor of conjunctiva noted  Neck: No JVD, thyromegaly or adenopathy  Respiratory: reduced air entry lower lungs with no rales, wheezing or rhonchi  Cardiovascular: S1 and S2 normally heard  Gastrointestinal: soft,obese, nondistended, nontender and normal bowel sounds heard  Extremities: No peripheral edema or cyanosis  Neurological: A/O x 3, no focal deficits  : No flank or cva tenderness palpated.  Skin: No rashes        LABS:    CBC:                          7.0    6.9   )-----------( 223      ( 24 Jan 2019 07:43 )             22.2     BMP:    01-24    141  |  111<H>  |  85<H>  ----------------------------<  102<H>  4.3   |  18<L>  |  7.97<H>  current egfr: 6 % -esrd/abbey  01-23    140  |  109<H>  |  81<H>  ----------------------------<  100<H>  4.2   |  20<L>  |  7.79<H>  01-22    142  |  111<H>  |  71<H>  ----------------------------<  94  4.0   |  20<L>  |  7.92<H>    Ca    8.8      24 Jan 2019 07:43  Ca    8.5      23 Jan 2019 07:17  Ca    8.6      22 Jan 2019 06:26  Phos  5.6     01-24  Phos  4.7     01-22  Mg     1.7     01-22    TPro  7.2  /  Alb  3.3<L>  /  TBili  0.2  /  DBili  x   /  AST  23  /  ALT  43  /  AlkPhos  142<H>  01-24  TPro  7.0  /  Alb  x   /  TBili  x   /  DBili  x   /  AST  x   /  ALT  x   /  AlkPhos  x   01-23  TPro  7.0  /  Alb  3.2<L>  /  TBili  0.2  /  DBili  x   /  AST  25  /  ALT  35  /  AlkPhos  124<H>  01-23  TPro  7.1  /  Alb  3.3<L>  /  TBili  0.2  /  DBili  x   /  AST  12  /  ALT  20  /  AlkPhos  96  01-22      Intact PTH: 116 pg/mL (01-22 @ 09:19)      URINE STUDIES:  Sodium, Random Urine: 52 mmol/L (01-19 @ 21:58)  Creatinine, Random Urine: 60 mg/dL (01-19 @ 21:58)  Osmolality, Random Urine: 284 mos/kg (01-19 @ 21:58)

## 2019-01-24 NOTE — PROGRESS NOTE ADULT - PROBLEM SELECTOR PLAN 1
- Cr of 7.74 and BUN of 58 on admission ; baseline back in Oct 2018 was Cr of 1.04 and BUN of 19; as per Dr. Vanessa Joe (PCP at the facility)  - Fena on admission was 4.9%; indicating post renal but no source noted on imaging. CT abdomen and US renal both unremarkable. Yoder in place and making good urine.   - No improvement in Cr over few days   - GGT elevated to 83  - KRYSTAL, Hepatitis panel, HIV, Complement levels, ASO, RF negative   - f/u ANCA, ASCA, Cryoglobulin, Urine bence stahl   - Patient might need kidney biopsy and dialysis if no improvement noted in Cr.   - Monitor BMP  - Avoid nephrotoxic medications   - Nephro follow up - Cr of 7.74 and BUN of 58 on admission ; baseline back in Oct 2018 was Cr of 1.04 and BUN of 19  - Fena on admission was 4.9%; indicating post renal but no source noted on imaging. CT abdomen and US renal both unremarkable. Yoder in place and making good urine.   - No improvement in Cr over few days   - GGT elevated to 83  - KRYSTAL, Hepatitis panel, HIV, Complement levels, ASO, RF negative   - f/u ANCA, ASCA, Cryoglobulin, Urine bence stahl, SPEP and UPEP   - Patient might need kidney biopsy and dialysis  - Monitor BMP  - Avoid nephrotoxic medications   - Nephro follow up - Cr of 7.74 and BUN of 58 on admission ; baseline back in Oct 2018 was Cr of 1.04 and BUN of 19  - Fena on admission was 4.9%; indicating post renal but no source noted on imaging. CT abdomen and US renal both unremarkable. Yoder in place and making good urine.   - No improvement in Cr over few days   - KRYSTAL, Hepatitis panel, HIV, Complement levels, ASO, RF negative   - f/u ANCA, ASCA, Cryoglobulin, Urine bence stahl, SPEP and UPEP , beta 2 microglobulin   - Could be ATN, continue with hydration.  - Patient might need kidney biopsy and dialysis  - Monitor BMP  - Avoid nephrotoxic medications   - Nephro follow up

## 2019-01-25 LAB
ABO RH CONFIRMATION: SIGNIFICANT CHANGE UP
ALBUMIN SERPL ELPH-MCNC: 3.2 G/DL — LOW (ref 3.5–5)
ALP SERPL-CCNC: 160 U/L — HIGH (ref 40–120)
ALT FLD-CCNC: 51 U/L DA — SIGNIFICANT CHANGE UP (ref 10–60)
ANION GAP SERPL CALC-SCNC: 9 MMOL/L — SIGNIFICANT CHANGE UP (ref 5–17)
AST SERPL-CCNC: 31 U/L — SIGNIFICANT CHANGE UP (ref 10–40)
B2 MICROGLOB SERPL-MCNC: 13.5 MG/L — HIGH (ref 0.8–2.2)
BILIRUB SERPL-MCNC: 0.3 MG/DL — SIGNIFICANT CHANGE UP (ref 0.2–1.2)
BUN SERPL-MCNC: 84 MG/DL — HIGH (ref 7–18)
CALCIUM SERPL-MCNC: 8.5 MG/DL — SIGNIFICANT CHANGE UP (ref 8.4–10.5)
CHLORIDE SERPL-SCNC: 111 MMOL/L — HIGH (ref 96–108)
CO2 SERPL-SCNC: 18 MMOL/L — LOW (ref 22–31)
CREAT SERPL-MCNC: 7.91 MG/DL — HIGH (ref 0.5–1.3)
GLUCOSE BLDC GLUCOMTR-MCNC: 127 MG/DL — HIGH (ref 70–99)
GLUCOSE BLDC GLUCOMTR-MCNC: 131 MG/DL — HIGH (ref 70–99)
GLUCOSE BLDC GLUCOMTR-MCNC: 135 MG/DL — HIGH (ref 70–99)
GLUCOSE BLDC GLUCOMTR-MCNC: 135 MG/DL — HIGH (ref 70–99)
GLUCOSE BLDC GLUCOMTR-MCNC: 153 MG/DL — HIGH (ref 70–99)
GLUCOSE SERPL-MCNC: 114 MG/DL — HIGH (ref 70–99)
HCT VFR BLD CALC: 23.2 % — LOW (ref 34.5–45)
HGB BLD-MCNC: 7.2 G/DL — LOW (ref 11.5–15.5)
INR BLD: 0.96 RATIO — SIGNIFICANT CHANGE UP (ref 0.88–1.16)
MAGNESIUM SERPL-MCNC: 2 MG/DL — SIGNIFICANT CHANGE UP (ref 1.6–2.6)
MCHC RBC-ENTMCNC: 26.7 PG — LOW (ref 27–34)
MCHC RBC-ENTMCNC: 31.1 GM/DL — LOW (ref 32–36)
MCV RBC AUTO: 86 FL — SIGNIFICANT CHANGE UP (ref 80–100)
PHOSPHATE SERPL-MCNC: 5 MG/DL — HIGH (ref 2.5–4.5)
PLATELET # BLD AUTO: 227 K/UL — SIGNIFICANT CHANGE UP (ref 150–400)
POTASSIUM SERPL-MCNC: 4.5 MMOL/L — SIGNIFICANT CHANGE UP (ref 3.5–5.3)
POTASSIUM SERPL-SCNC: 4.5 MMOL/L — SIGNIFICANT CHANGE UP (ref 3.5–5.3)
PROT SERPL-MCNC: 7.1 G/DL — SIGNIFICANT CHANGE UP (ref 6–8.3)
PROTHROM AB SERPL-ACNC: 10.6 SEC — SIGNIFICANT CHANGE UP (ref 10–12.9)
RBC # BLD: 2.7 M/UL — LOW (ref 3.8–5.2)
RBC # FLD: 11.3 % — SIGNIFICANT CHANGE UP (ref 10.3–14.5)
SODIUM SERPL-SCNC: 138 MMOL/L — SIGNIFICANT CHANGE UP (ref 135–145)
WBC # BLD: 7.6 K/UL — SIGNIFICANT CHANGE UP (ref 3.8–10.5)
WBC # FLD AUTO: 7.6 K/UL — SIGNIFICANT CHANGE UP (ref 3.8–10.5)

## 2019-01-25 PROCEDURE — 77001 FLUOROGUIDE FOR VEIN DEVICE: CPT | Mod: 26

## 2019-01-25 PROCEDURE — 36558 INSERT TUNNELED CV CATH: CPT

## 2019-01-25 PROCEDURE — 76937 US GUIDE VASCULAR ACCESS: CPT | Mod: 26

## 2019-01-25 RX ORDER — TUBERCULIN PURIFIED PROTEIN DERIVATIVE 5 [IU]/.1ML
5 INJECTION, SOLUTION INTRADERMAL ONCE
Qty: 0 | Refills: 0 | Status: COMPLETED | OUTPATIENT
Start: 2019-01-25 | End: 2019-01-25

## 2019-01-25 RX ADMIN — Medication 0.5 MILLIGRAM(S): at 06:23

## 2019-01-25 RX ADMIN — ERYTHROPOIETIN 6000 UNIT(S): 10000 INJECTION, SOLUTION INTRAVENOUS; SUBCUTANEOUS at 17:47

## 2019-01-25 RX ADMIN — TUBERCULIN PURIFIED PROTEIN DERIVATIVE 5 UNIT(S): 5 INJECTION, SOLUTION INTRADERMAL at 16:43

## 2019-01-25 RX ADMIN — HEPARIN SODIUM 5000 UNIT(S): 5000 INJECTION INTRAVENOUS; SUBCUTANEOUS at 06:23

## 2019-01-25 RX ADMIN — IRON SUCROSE 210 MILLIGRAM(S): 20 INJECTION, SOLUTION INTRAVENOUS at 11:26

## 2019-01-25 RX ADMIN — SIMVASTATIN 20 MILLIGRAM(S): 20 TABLET, FILM COATED ORAL at 23:00

## 2019-01-25 RX ADMIN — AMLODIPINE BESYLATE 5 MILLIGRAM(S): 2.5 TABLET ORAL at 06:23

## 2019-01-25 RX ADMIN — SODIUM CHLORIDE 100 MILLILITER(S): 9 INJECTION INTRAMUSCULAR; INTRAVENOUS; SUBCUTANEOUS at 06:28

## 2019-01-25 RX ADMIN — Medication 1: at 12:27

## 2019-01-25 RX ADMIN — Medication 1 SPRAY(S): at 06:22

## 2019-01-25 RX ADMIN — Medication 25 MICROGRAM(S): at 06:23

## 2019-01-25 RX ADMIN — HEPARIN SODIUM 5000 UNIT(S): 5000 INJECTION INTRAVENOUS; SUBCUTANEOUS at 23:00

## 2019-01-25 NOTE — CONSULT NOTE ADULT - SUBJECTIVE AND OBJECTIVE BOX
HPI:  53 female with PMH of DM , Schizophrenia , HLD , comes in with complaint of vomiting. Patient was BIB EMS to the ED for from assisted living with the report that patient was difficult to arouse, hot and sweating. In ED, patient states that she felt like sleeping after having her medication and did not want to wake up for 3 hours. Reports having nose bleeding and episode of NBNP vomiting after which she fell asleep for 3 hours. Patient denies feeling sweaty or hot or losing consciousness. Patient has no complaints a this time. Patient denies recent illness, pain, headache, shortness of breath, dizziness or any other symptoms. Per EMS note and Assisted living papers , patient  has been noted to be slightly lethargic since few months now with poor po intake.     Interval History:   53 Female with PMH of DM admitted for worsening renal failure. Going for PermCath placement for long term renal replacement therapy. No chest pain or shortness of breath.     MEDICATIONS  (STANDING):  amLODIPine   Tablet 5 milliGRAM(s) Oral daily  aspirin enteric coated 81 milliGRAM(s) Oral daily  benztropine 0.5 milliGRAM(s) Oral two times a day  calcium acetate 667 milliGRAM(s) Oral three times a day with meals  dextrose 5%. 1000 milliLiter(s) (50 mL/Hr) IV Continuous <Continuous>  dextrose 50% Injectable 12.5 Gram(s) IV Push once  dextrose 50% Injectable 25 Gram(s) IV Push once  dextrose 50% Injectable 25 Gram(s) IV Push once  epoetin seema Injectable 6000 Unit(s) SubCutaneous <User Schedule>  fluticasone propionate 50 MICROgram(s)/spray Nasal Spray 1 Spray(s) Both Nostrils two times a day  heparin  Injectable 5000 Unit(s) SubCutaneous every 8 hours  insulin lispro (HumaLOG) corrective regimen sliding scale   SubCutaneous Before meals and at bedtime  iron sucrose IVPB 100 milliGRAM(s) IV Intermittent every 24 hours  levothyroxine 25 MICROGram(s) Oral daily  simvastatin 20 milliGRAM(s) Oral at bedtime  sodium chloride 0.9%. 1000 milliLiter(s) (100 mL/Hr) IV Continuous <Continuous>    MEDICATIONS  (PRN):  dextrose 40% Gel 15 Gram(s) Oral once PRN Blood Glucose LESS THAN 70 milliGRAM(s)/deciLiter  glucagon  Injectable 1 milliGRAM(s) IntraMuscular once PRN Glucose <70 milliGRAM(s)/deciLiter    PAST MEDICAL & SURGICAL HISTORY:  CAD (coronary artery disease)  Bipolar disorder  Schizophrenia  Diabetes  No significant past surgical history      REVIEW OF SYSTEMS:  CONSTITUTIONAL: No weakness, fevers or chills  RESPIRATORY: No cough, wheezing, hemoptysis; No shortness of breath  CARDIOVASCULAR: No chest pain or palpitations  GASTROINTESTINAL: No abdominal or epigastric pain. No nausea, vomiting  NEUROLOGICAL: No numbness or weakness  All other review of systems is negative unless indicated above.    Vital Signs Last 24 Hrs  T(C): 37 (25 Jan 2019 08:10), Max: 37.4 (25 Jan 2019 05:07)  T(F): 98.6 (25 Jan 2019 08:10), Max: 99.3 (25 Jan 2019 05:07)  HR: 64 (25 Jan 2019 08:10) (59 - 66)  BP: 134/54 (25 Jan 2019 08:10) (119/46 - 134/54)  BP(mean): --  RR: 20 (25 Jan 2019 08:10) (18 - 20)  SpO2: 100% (25 Jan 2019 08:10) (100% - 100%)    .  GENERAL: Well developed, Well nourished  female, NAD  HEENT:  Normocephalic/Atraumatic, reactive light reflex, moist mucous membranes  NECK: Supple, no JVD  RESP: Symmetric movement of the chest, clear to auscultation bilaterally  CVS:  S1 and S2 audible, no murmur, rubs or gallops noted  GI: Normal active bowel sounds present, abdomen soft, non tender, non distended, Yoder+  EXTREMITIES:  No edema, no clubbing, cyanosis   MSK: 5/5 strength bilateral upper and lower extremities  NEURO: Alert and oriented x 3      EKG: Normal sinus rhythm with occasional PVCs.                          7.2    7.6   )-----------( 227      ( 25 Jan 2019 07:09 )             23.2     01-25    138  |  111<H>  |  84<H>  ----------------------------<  114<H>  4.5   |  18<L>  |  7.91<H>    Ca    8.5      25 Jan 2019 07:09  Phos  5.0     01-25  Mg     2.0     01-25    TPro  7.1  /  Alb  3.2<L>  /  TBili  0.3  /  DBili  x   /  AST  31  /  ALT  51  /  AlkPhos  160<H>  01-25      Assessment/Plan    1) Pre-operation risk stratification for PermCath placement for long term renal replacement therapy  No chest pain, shortness of breath  CXR: Grossly normal  EKG: Normal sinus with PVCs without peaked T waves  Elevated Renal functions which may cause uremic bleeding transiently afterwards  RCRI score of 1: 6% risk of major cardiac event   Leung score: 0.1% risk of cardiac arrest or MI  Pt is at low risk for Catheter placement

## 2019-01-25 NOTE — CHART NOTE - NSCHARTNOTEFT_GEN_A_CORE
Patient got permacath placement, going for dialysis soon.    PPD placed on flexor side of left forearm

## 2019-01-25 NOTE — PROGRESS NOTE ADULT - SUBJECTIVE AND OBJECTIVE BOX
pt seen and examined.pts current chart reviewed and case discussed with resident covering.    SUBJECTIVE:  pt feels fine and denies cp,sob,gi or gu/uremic  symptons    REVIEW OF SYSTEMS:  CONSTITUTIONAL: No weakness, fevers or chills  EYES/ENT: No visual changes;  No vertigo or throat pain   NECK: No pain or stiffness  RESPIRATORY: No cough, wheezing, hemoptysis; No shortness of breath  CARDIOVASCULAR: No chest pain or palpitations  GASTROINTESTINAL: No abdominal or epigastric pain. No nausea, vomiting, or hematemesis; No diarrhea or constipation. No melena or hematochezia.  GENITOURINARY: No dysuria, frequency , hematuria, flank pain or nocturia  NEUROLOGICAL: No numbness or weakness  SKIN: No itching, burning, rashes, or lesions   All other review of systems is negative unless indicated above    Current meds:    amLODIPine   Tablet 5 milliGRAM(s) Oral daily  aspirin enteric coated 81 milliGRAM(s) Oral daily  benztropine 0.5 milliGRAM(s) Oral two times a day  calcium acetate 667 milliGRAM(s) Oral three times a day with meals  dextrose 40% Gel 15 Gram(s) Oral once PRN  dextrose 5%. 1000 milliLiter(s) IV Continuous <Continuous>  dextrose 50% Injectable 12.5 Gram(s) IV Push once  dextrose 50% Injectable 25 Gram(s) IV Push once  dextrose 50% Injectable 25 Gram(s) IV Push once  epoetin seema Injectable 6000 Unit(s) SubCutaneous <User Schedule>  fluticasone propionate 50 MICROgram(s)/spray Nasal Spray 1 Spray(s) Both Nostrils two times a day  glucagon  Injectable 1 milliGRAM(s) IntraMuscular once PRN  heparin  Injectable 5000 Unit(s) SubCutaneous every 8 hours  insulin lispro (HumaLOG) corrective regimen sliding scale   SubCutaneous Before meals and at bedtime  iron sucrose IVPB 100 milliGRAM(s) IV Intermittent every 24 hours  levothyroxine 25 MICROGram(s) Oral daily  simvastatin 20 milliGRAM(s) Oral at bedtime  sodium chloride 0.9%. 1000 milliLiter(s) IV Continuous <Continuous>      Vital Signs    T(F): 98.6 (01-25-19 @ 08:10), Max: 99.3 (01-25-19 @ 05:07)  HR: 64 (01-25-19 @ 08:10) (64 - 66)  BP: 134/54 (01-25-19 @ 08:10) (119/46 - 134/54)  ABP: --  RR: 20 (01-25-19 @ 08:10) (18 - 20)  SpO2: 100% (01-25-19 @ 08:10) (100% - 100%)  Wt(kg): --  CVP(cm H2O): --  CO: --  PCWP: --    I and O's:    01-23 @ 07:01  -  01-24 @ 07:00  --------------------------------------------------------  IN:  Total IN: 0 mL    OUT:    Indwelling Catheter - Urethral: 2600 mL  Total OUT: 2600 mL    Total NET: -2600 mL      01-24 @ 07:01  -  01-25 @ 07:00  --------------------------------------------------------  IN:  Total IN: 0 mL    OUT:    Indwelling Catheter - Urethral: 3850 mL  Total OUT: 3850 mL    Total NET: -3850 mL      01-25 @ 07:01  -  01-25 @ 16:46  --------------------------------------------------------  IN:  Total IN: 0 mL    OUT:    Indwelling Catheter - Urethral: 1100 mL  Total OUT: 1100 mL    Total NET: -1100 mL        Daily     Daily     PHYSICAL EXAM:  Constitutional: well developed, well nourished  and in nad  HEENT: PERRLA,  no icteric sclera and mild pallor of conjunctiva noted  Neck: No JVD, thyromegaly or adenopathy  Respiratory: reduced air entry lower lungs with no rales, wheezing or rhonchi  Cardiovascular: S1 and S2 normally heard  Gastrointestinal: soft, nondistended, nontender and normal bowel sounds heard  Extremities: No peripheral edema or cyanosis  Neurological: A/O x 3, no focal deficits  : No flank or cva tenderness palpated.  Skin: No rashes      LABS:    CBC:                          7.2    7.6   )-----------( 227      ( 25 Jan 2019 07:09 )             23.2           BMP:    01-25    138  |  111<H>  |  84<H>  ----------------------------<  114<H>  4.5   |  18<L>  |  7.91<H>  01-24    141  |  111<H>  |  85<H>  ----------------------------<  102<H>  4.3   |  18<L>  |  7.97<H>  01-23    140  |  109<H>  |  81<H>  ----------------------------<  100<H>  4.2   |  20<L>  |  7.79<H>    Ca    8.5      25 Jan 2019 07:09  Ca    8.8      24 Jan 2019 07:43  Ca    8.5      23 Jan 2019 07:17  Phos  5.0     01-25  Phos  5.6     01-24  Mg     2.0     01-25    TPro  7.1  /  Alb  3.2<L>  /  TBili  0.3  /  DBili  x   /  AST  31  /  ALT  51  /  AlkPhos  160<H>  01-25  TPro  7.2  /  Alb  3.3<L>  /  TBili  0.2  /  DBili  x   /  AST  23  /  ALT  43  /  AlkPhos  142<H>  01-24  TPro  7.0  /  Alb  3.9  /  TBili  x   /  DBili  x   /  AST  x   /  ALT  x   /  AlkPhos  x   01-23  TPro  7.0  /  Alb  3.2<L>  /  TBili  0.2  /  DBili  x   /  AST  25  /  ALT  35  /  AlkPhos  124<H>  01-23          URINE STUDIES:        Sodium, Random Urine: 52 mmol/L (01-19 @ 21:58)  Creatinine, Random Urine: 60 mg/dL (01-19 @ 21:58)  Osmolality, Random Urine: 284 mos/kg (01-19 @ 21:58)                  RADIOLOGY & ADDITIONAL STUDIES: pt seen and examined.    SUBJECTIVE:  pt seen in dialysis , s/p PC by IR today  pt feels fine and denies cp,sob,gi or uremic  symptons  U/O good via del cid cath    Current meds:    amLODIPine   Tablet 5 milliGRAM(s) Oral daily  aspirin enteric coated 81 milliGRAM(s) Oral daily  benztropine 0.5 milliGRAM(s) Oral two times a day  calcium acetate 667 milliGRAM(s) Oral three times a day with meals  dextrose 40% Gel 15 Gram(s) Oral once PRN  dextrose 5%. 1000 milliLiter(s) IV Continuous <Continuous>  dextrose 50% Injectable 12.5 Gram(s) IV Push once  dextrose 50% Injectable 25 Gram(s) IV Push once  dextrose 50% Injectable 25 Gram(s) IV Push once  epoetin seema Injectable 6000 Unit(s) SubCutaneous <User Schedule>  fluticasone propionate 50 MICROgram(s)/spray Nasal Spray 1 Spray(s) Both Nostrils two times a day  glucagon  Injectable 1 milliGRAM(s) IntraMuscular once PRN  heparin  Injectable 5000 Unit(s) SubCutaneous every 8 hours  insulin lispro (HumaLOG) corrective regimen sliding scale   SubCutaneous Before meals and at bedtime  iron sucrose IVPB 100 milliGRAM(s) IV Intermittent every 24 hours  levothyroxine 25 MICROGram(s) Oral daily  simvastatin 20 milliGRAM(s) Oral at bedtime  sodium chloride 0.9%. 1000 milliLiter(s) IV Continuous <Continuous>      Vital Signs    T(F): 98.6 (01-25-19 @ 08:10), Max: 99.3 (01-25-19 @ 05:07)  HR: 64 (01-25-19 @ 08:10) (64 - 66)  BP: 134/54 (01-25-19 @ 08:10) (119/46 - 134/54)  ABP: --  RR: 20 (01-25-19 @ 08:10) (18 - 20)  SpO2: 100% (01-25-19 @ 08:10) (100% - 100%)  Wt(kg): --  CVP(cm H2O): --  CO: --  PCWP: --    I and O's:    01-23 @ 07:01  -  01-24 @ 07:00  --------------------------------------------------------  IN:  Total IN: 0 mL    OUT:    Indwelling Catheter - Urethral: 2600 mL  Total OUT: 2600 mL    Total NET: -2600 mL      01-24 @ 07:01 - 01-25 @ 07:00  --------------------------------------------------------  IN:  Total IN: 0 mL    OUT:    Indwelling Catheter - Urethral: 3850 mL  Total OUT: 3850 mL    Total NET: -3850 mL      01-25 @ 07:01 - 01-25 @ 16:46  --------------------------------------------------------  IN:  Total IN: 0 mL    OUT:    Indwelling Catheter - Urethral: 1100 mL  Total OUT: 1100 mL    Total NET: -1100 mL        Daily     Daily     PHYSICAL EXAM:  Constitutional: well developed, well nourished  and in nad  HEENT: PERRLA,  no icteric sclera and mild pallor of conjunctiva noted  Neck: No JVD, thyromegaly or adenopathy  PC noted in Rt chest  Respiratory: reduced air entry lower lungs with no rales, wheezing or rhonchi  Cardiovascular: S1 and S2 normally heard  Gastrointestinal: soft, nondistended, nontender and normal bowel sounds heard  Extremities: No peripheral edema or cyanosis  Neurological: A/O x 3, no focal deficits  Skin: No rashes      LABS:    CBC:                          7.2    7.6   )-----------( 227      ( 25 Jan 2019 07:09 )             23.2           BMP:    01-25    138  |  111<H>  |  84<H>  ----------------------------<  114<H>  4.5   |  18<L>  |  7.91<H>  01-24    141  |  111<H>  |  85<H>  ----------------------------<  102<H>  4.3   |  18<L>  |  7.97<H>  01-23    140  |  109<H>  |  81<H>  ----------------------------<  100<H>  4.2   |  20<L>  |  7.79<H>    Ca    8.5      25 Jan 2019 07:09  Ca    8.8      24 Jan 2019 07:43  Ca    8.5      23 Jan 2019 07:17  Phos  5.0     01-25  Phos  5.6     01-24  Mg     2.0     01-25    TPro  7.1  /  Alb  3.2<L>  /  TBili  0.3  /  DBili  x   /  AST  31  /  ALT  51  /  AlkPhos  160<H>  01-25  TPro  7.2  /  Alb  3.3<L>  /  TBili  0.2  /  DBili  x   /  AST  23  /  ALT  43  /  AlkPhos  142<H>  01-24  TPro  7.0  /  Alb  3.9  /  TBili  x   /  DBili  x   /  AST  x   /  ALT  x   /  AlkPhos  x   01-23  TPro  7.0  /  Alb  3.2<L>  /  TBili  0.2  /  DBili  x   /  AST  25  /  ALT  35  /  AlkPhos  124<H>  01-23

## 2019-01-25 NOTE — PROGRESS NOTE ADULT - PROBLEM SELECTOR PLAN 1
OPERATIVE REPORT  PATIENT NAME: Kemi Bull    :  1973  MRN: [id number]  Pt Location: AN OR ROOM 04    SURGERY DATE: 2018    Surgeon(s) and Role:     * Prosepr Blanc MD - Primary    Preop Diagnosis:  Nephrolithiasis [N20 0]    Post-Op Diagnosis Codes:     * Nephrolithiasis [N20 0]    Procedure(s) (LRB):  CYSTOSCOPY URETEROSCOPY, RETROGRADE PYELOGRAM AND INSERTION STENT URETERAL (Left)    Specimen(s):  * No specimens in log *    Estimated Blood Loss:   Minimal    Drains:  Ureteral Drain/Stent Left ureter 6 Fr  (Active)   Number of days: 0       Anesthesia Type:   General    Operative Indications:  Nephrolithiasis [N20 0]  Distal left ureteral stones    Operative Findings:  Cystoscopy is normal, a dilated left ureteral orifice was noted, retrograde pyelography is normal, no stones were seen in the distal, proximal, mid, or proximal most ureter and the collecting system showed some Chris's plaques but no other significant findings  A stent was left on a string    Complications:   None    Procedure and Technique:      PROCEDURES PERFORMED:  1) Cystoscopy  2) Left retrograde pyelography with fluoroscopic interpretation  3) Left ureteroscopy (diagnostic)  4) Left ureteral stent placement (6F x 26cm    SURGEON:  Prosper Blanc MD    ASSISTANTS:  None    NOTE:  There were no qualified teaching residents to assist with this case    ANESTHESIA: General     COMPLICATIONS:   None    ANTIBIOTICS:  Ancef    INTRAOPERATIVE THROMBOEMBOLISM PROPHYLAXIS:  Pneumatic compression stockings         FINDINGS:    1  The Left calculus was not radiopaque on plain fluoroscopy  2  Retrograde pyelogram was performed on the Left side using a 5 Fr open ended catheter  4 of 50% dilute Isovue was injected  3  The following findings were noted: Mild hydroureteronephrosis  Mildly dilated calyces  No filling defects  Contrast drained out of the collecting system  INDICATIONS FOR PROCEDURE:  Kemi Bull is an 41 y  o  old male with Left ureteral calculus as noted on preoperative CT scan of the abdomen and pelvis apparently lies within the intramural ureter  After discussing the options for treatment, including medical expulsive therapy, extracorporeal shockwave lithotripsy, and ureteroscopy, the patient elected to undergo ureteroscopy and ureteral stent placement  We discussed the procedure in detail, the alternatives, and the risks, and they signed informed consent to proceed (these are outlined in the surgical consent form)  PROCEDURE IN DETAIL:     The patient was identified by name, date of birth, and MRN  and brought to the OR  Antibiotic prophylaxis and DVT prophylaxis were administered as per the guidelines  They were placed in the dorsal lithotomy position with care to pad all pressure points  They were prepped and draped in the usual sterile fashion  A surgical time out was performed with all in the room in agreement with the correct patient, procedure, indications, and laterality  A 21-Portuguese rigid cystoscope was used to enter the bladder  The bladder was inspected in its entirety and there were no lesions noted  The ureteral orifices were identified in their normal orthotopic positions  The Left ureteral orifice was identified and a 5 Fr open ended catheter was placed into the ureteral orifice  A retrograde pyelogram was performed with the findings as described above  A Solo wire was advanced up to the kidney under fluoroscopic guidance  Leaving this safety wire in place, the bladder was drained  A "7 5 Fr semi-rigid ureteroscope was advanced up the ureter under vision   No stone was encountered in the distal ureter although there was an area of dilatation here indicative of previous stone being in this area  The proximal ureter was free of stricture and other structural abnormalities      The patient had no evidence of a ureteral duplication on retrograde pyelography or on preoperative imaging  I again reviewed his films in realtime confirming a distal ureteral stone on these films and then passed a 5 3 Western Desiree flexible ureteral scope into the proximal collecting system and systemic pyeloscopy was performed  There were 2 Chris's plaques noted in the inferior calices, but no free-floating stones  The ureteroscope was backed down the ureter under vision and there were no residual fragments and the ureter was noted to be intact with no injury and mild edema where the stone had been located  A 6 Solomon Islander by 26 centimeter left JJ stent was then passed up the wire  under fluoroscopic guidance into the kidney with a good curl noted in the kidney and in the bladder  The stent string was not removed  The bladder was drained  All instrument counts and sponge counts were correct  The patient was placed back into the supine position, awakened from general anesthesia and brought to recovery room in stable condition  ESTIMATED BLOOD LOSS:  Minimal      DRAINS:   Ureteral Drain/Stent Left ureter 6 Fr  (Active)       SPECIMENS:   * No orders in the log *     IMPLANTS:     Implant Name Type Inv  Item Serial No   Lot No  LRB No  Used   URETERAL STENT 6 FR X 26 CM OPTIMA INLAY - [id number]   URETERAL STENT 6 FR X 26 CM OPTIMA INLAY   [employer] [id number] Left 1        COMPLICATIONS:  None    DISPOSITION: PACU     PLAN:  The patient will follow up in a number of days for ureteral stent removal, he will then get a 6 weeks KUB and renal ultrasound    He will then follow on a yearly basis     I was present for the entire procedure and A qualified resident physician was not available    Patient Disposition:  PACU     SIGNATURE: Ryan Roman MD  DATE: August 21, 2018  TIME: 1:23 PM - Cr of 7.74 and BUN of 58 on admission ; baseline back in Oct 2018 was Cr of 1.04 and BUN of 19  - Fena on admission was 4.9%; indicating post renal but no source noted on imaging. CT abdomen and US renal both unremarkable. Yoder in place and making good urine.   - ANCA, ASCA, KRYSTAL, Hepatitis panel, HIV, Complement levels, ASO, RF and SPEP negative   - f/u Cryoglobulin, Urine bence stahl, UPEP and beta 2 microglobulin   - Could be ATN, continue with hydration  - Since no improvement in creatinine over few days, will proceed with dialysis as per Nephro prior to kidney biopsy.   - Monitor BMP  - Avoid nephrotoxic medications   - Nephro follow up

## 2019-01-25 NOTE — PROGRESS NOTE ADULT - ASSESSMENT
ASSESSMENT AND PLAN:  1. DEISY : Etiology unclear as pt had near normal cr of 1.04 in oct 2018 , now ct of 7 to 7.9  DD include;ATN (most likely) ,AIN or RPGN (less likley as pts ua is benign on admission with mild ppriteinuria)  -pts egfr is poor and not getting better with iv hydration , non oliguric  -may need renal biopsy pending serology   -pt will need dialysis x few times prior to renal biospy as pt will be high risk for bleeding if pt is not dialysed prior to biopsy  -d/t pt/pts family(sister) regarding dialysis and pt/pts sister agree for hd and renal biopsy if needed next week  -continue iv saline x 24 hrs  2. 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  -f/u bmp daily  -check CPK to r/o Rhabdomyolysis as pt was on Zocor at adult home  -Keep patient euvolemic and renal diet  Avoid Nephrtoxic Meds/ Agents such as NSAIDs, Gadolinium contrast, Phosphate containing enemas, etc..)  Adjust Medications according to eGFR  3. 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 DEISY : Etiology unclear as pt had near normal cr of 1.04 in oct 2018 , now ct of 7 to 7.9  DD include;ATN (most likely) ,AIN or RPGN (less likley as pts ua is benign on admission with mild ppriteinuria)  -pts egfr is poor and not getting better with iv hydration , non oliguric  -may need renal biopsy pending serology   -we will continue hd as discussed with pt and housestaff  -pt will get 2 hrs today ,3hrs tomorrow and then Monday  -we will plan for renal biopsy next week as per IR   -2. 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  -f/u bmp daily  -Keep patient euvolemic and renal diet  Avoid Nephrtoxic Meds/ Agents such as NSAIDs, Gadolinium contrast, Phosphate containing enemas, etc..)  Adjust Medications according to eGFR  3. 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

## 2019-01-26 LAB
ANION GAP SERPL CALC-SCNC: 6 MMOL/L — SIGNIFICANT CHANGE UP (ref 5–17)
BUN SERPL-MCNC: 58 MG/DL — HIGH (ref 7–18)
CALCIUM SERPL-MCNC: 8.6 MG/DL — SIGNIFICANT CHANGE UP (ref 8.4–10.5)
CHLORIDE SERPL-SCNC: 111 MMOL/L — HIGH (ref 96–108)
CO2 SERPL-SCNC: 24 MMOL/L — SIGNIFICANT CHANGE UP (ref 22–31)
CREAT SERPL-MCNC: 6.64 MG/DL — HIGH (ref 0.5–1.3)
GLUCOSE BLDC GLUCOMTR-MCNC: 128 MG/DL — HIGH (ref 70–99)
GLUCOSE BLDC GLUCOMTR-MCNC: 144 MG/DL — HIGH (ref 70–99)
GLUCOSE BLDC GLUCOMTR-MCNC: 160 MG/DL — HIGH (ref 70–99)
GLUCOSE BLDC GLUCOMTR-MCNC: 217 MG/DL — HIGH (ref 70–99)
GLUCOSE SERPL-MCNC: 119 MG/DL — HIGH (ref 70–99)
HCT VFR BLD CALC: 28.5 % — LOW (ref 34.5–45)
HGB BLD-MCNC: 9.2 G/DL — LOW (ref 11.5–15.5)
MAGNESIUM SERPL-MCNC: 1.8 MG/DL — SIGNIFICANT CHANGE UP (ref 1.6–2.6)
MCHC RBC-ENTMCNC: 27.2 PG — SIGNIFICANT CHANGE UP (ref 27–34)
MCHC RBC-ENTMCNC: 32.2 GM/DL — SIGNIFICANT CHANGE UP (ref 32–36)
MCV RBC AUTO: 84.3 FL — SIGNIFICANT CHANGE UP (ref 80–100)
PHOSPHATE SERPL-MCNC: 4.3 MG/DL — SIGNIFICANT CHANGE UP (ref 2.5–4.5)
PLATELET # BLD AUTO: 238 K/UL — SIGNIFICANT CHANGE UP (ref 150–400)
POTASSIUM SERPL-MCNC: 4.4 MMOL/L — SIGNIFICANT CHANGE UP (ref 3.5–5.3)
POTASSIUM SERPL-SCNC: 4.4 MMOL/L — SIGNIFICANT CHANGE UP (ref 3.5–5.3)
RBC # BLD: 3.38 M/UL — LOW (ref 3.8–5.2)
RBC # FLD: 11.9 % — SIGNIFICANT CHANGE UP (ref 10.3–14.5)
SODIUM SERPL-SCNC: 141 MMOL/L — SIGNIFICANT CHANGE UP (ref 135–145)
WBC # BLD: 9.7 K/UL — SIGNIFICANT CHANGE UP (ref 3.8–10.5)
WBC # FLD AUTO: 9.7 K/UL — SIGNIFICANT CHANGE UP (ref 3.8–10.5)

## 2019-01-26 RX ADMIN — Medication 25 MICROGRAM(S): at 07:02

## 2019-01-26 RX ADMIN — Medication 0.5 MILLIGRAM(S): at 07:02

## 2019-01-26 RX ADMIN — AMLODIPINE BESYLATE 5 MILLIGRAM(S): 2.5 TABLET ORAL at 07:02

## 2019-01-26 RX ADMIN — Medication 667 MILLIGRAM(S): at 08:22

## 2019-01-26 RX ADMIN — Medication 667 MILLIGRAM(S): at 12:01

## 2019-01-26 RX ADMIN — HEPARIN SODIUM 5000 UNIT(S): 5000 INJECTION INTRAVENOUS; SUBCUTANEOUS at 22:08

## 2019-01-26 RX ADMIN — SIMVASTATIN 20 MILLIGRAM(S): 20 TABLET, FILM COATED ORAL at 22:08

## 2019-01-26 RX ADMIN — HEPARIN SODIUM 5000 UNIT(S): 5000 INJECTION INTRAVENOUS; SUBCUTANEOUS at 14:23

## 2019-01-26 RX ADMIN — HEPARIN SODIUM 5000 UNIT(S): 5000 INJECTION INTRAVENOUS; SUBCUTANEOUS at 07:03

## 2019-01-26 RX ADMIN — IRON SUCROSE 210 MILLIGRAM(S): 20 INJECTION, SOLUTION INTRAVENOUS at 14:26

## 2019-01-26 RX ADMIN — Medication 2: at 22:08

## 2019-01-26 RX ADMIN — Medication 81 MILLIGRAM(S): at 12:01

## 2019-01-26 RX ADMIN — Medication 1 SPRAY(S): at 07:02

## 2019-01-26 NOTE — PROGRESS NOTE ADULT - SUBJECTIVE AND OBJECTIVE BOX
pt seen and examined.pts current chart reviewed and case discussed with resident covering.    SUBJECTIVE:  pt feels fine and denies cp,sob,gi or gu/uremic  symptons    REVIEW OF SYSTEMS:  CONSTITUTIONAL: No weakness, fevers or chills  EYES/ENT: No visual changes;  No vertigo or throat pain   NECK: No pain or stiffness  RESPIRATORY: No cough, wheezing, hemoptysis; No shortness of breath  CARDIOVASCULAR: No chest pain or palpitations  GASTROINTESTINAL: No abdominal or epigastric pain. No nausea, vomiting, or hematemesis; No diarrhea or constipation. No melena or hematochezia.  GENITOURINARY: No dysuria, frequency , hematuria, flank pain or nocturia  NEUROLOGICAL: No numbness or weakness  SKIN: No itching, burning, rashes, or lesions   All other review of systems is negative unless indicated above    Current meds:    amLODIPine   Tablet 5 milliGRAM(s) Oral daily  aspirin enteric coated 81 milliGRAM(s) Oral daily  benztropine 0.5 milliGRAM(s) Oral two times a day  calcium acetate 667 milliGRAM(s) Oral three times a day with meals  dextrose 40% Gel 15 Gram(s) Oral once PRN  dextrose 5%. 1000 milliLiter(s) IV Continuous <Continuous>  dextrose 50% Injectable 12.5 Gram(s) IV Push once  dextrose 50% Injectable 25 Gram(s) IV Push once  dextrose 50% Injectable 25 Gram(s) IV Push once  epoetin seema Injectable 6000 Unit(s) SubCutaneous <User Schedule>  fluticasone propionate 50 MICROgram(s)/spray Nasal Spray 1 Spray(s) Both Nostrils two times a day  glucagon  Injectable 1 milliGRAM(s) IntraMuscular once PRN  heparin  Injectable 5000 Unit(s) SubCutaneous every 8 hours  insulin lispro (HumaLOG) corrective regimen sliding scale   SubCutaneous Before meals and at bedtime  levothyroxine 25 MICROGram(s) Oral daily  simvastatin 20 milliGRAM(s) Oral at bedtime  sodium chloride 0.9%. 1000 milliLiter(s) IV Continuous <Continuous>      Vital Signs    T(F): 98.2 (19 @ 16:10), Max: 99.3 (19 @ 23:19)  HR: 72 (19 @ 16:10) (60 - 78)  BP: 136/56 (01-26-19 @ 16:10) (118/67 - 157/87)  ABP: --  RR: 16 (19 @ 16:10) (16 - 20)  SpO2: 100% (19 @ 16:10) (96% - 100%)  Wt(kg): --  CVP(cm H2O): --  CO: --  PCWP: --    I and O's:     @ 07:01  -   @ 07:00  --------------------------------------------------------  IN:  Total IN: 0 mL    OUT:    Indwelling Catheter - Urethral: 3850 mL  Total OUT: 3850 mL    Total NET: -3850 mL       @ 07:01  -   @ 07:00  --------------------------------------------------------  IN:    Packed Red Blood Cells: 350 mL  Total IN: 350 mL    OUT:    Indwelling Catheter - Urethral: 2850 mL  Total OUT: 2850 mL    Total NET: -2500 mL       @ 07:01  -   @ 17:24  --------------------------------------------------------  IN:  Total IN: 0 mL    OUT:    Indwelling Catheter - Urethral: 800 mL  Total OUT: 800 mL    Total NET: -800 mL        Daily     Daily Weight in k (2019 20:11)    PHYSICAL EXAM:  Constitutional: well developed, well nourished  and in nad  HEENT: PERRLA,  no icteric sclera and mild pallor of conjunctiva noted  Neck: No JVD, thyromegaly or adenopathy  PC noted in Rt chest  Respiratory: reduced air entry lower lungs with no rales, wheezing or rhonchi  Cardiovascular: S1 and S2 normally heard  Gastrointestinal: soft, nondistended, nontender and normal bowel sounds heard  Extremities: No peripheral edema or cyanosis  Neurological: A/O x 3, no focal deficits  Skin: No rashes      LABS:    CBC:                          9.2    9.7   )-----------( 238      ( 2019 10:42 )             28.5           BMP:        141  |  111<H>  |  58<H>  ----------------------------<  119<H>  4.4   |  24  |  6.64<H>      138  |  111<H>  |  84<H>  ----------------------------<  114<H>  4.5   |  18<L>  |  7.91<H>      141  |  111<H>  |  85<H>  ----------------------------<  102<H>  4.3   |  18<L>  |  7.97<H>    Ca    8.6      2019 10:42  Ca    8.5      2019 07:09  Ca    8.8      2019 07:43  Phos  4.3       Phos  5.0       Phos  5.6       Mg     1.8       Mg     2.0         TPro  7.1  /  Alb  3.2<L>  /  TBili  0.3  /  DBili  x   /  AST  31  /  ALT  51  /  AlkPhos  160<H>    TPro  7.2  /  Alb  3.3<L>  /  TBili  0.2  /  DBili  x   /  AST  23  /  ALT  43  /  AlkPhos  142<H>    TPro  7.0  /  Alb  3.9  /  TBili  x   /  DBili  x   /  AST  x   /  ALT  x   /  AlkPhos  x             URINE STUDIES:        Sodium, Random Urine: 52 mmol/L ( @ 21:58)  Creatinine, Random Urine: 60 mg/dL ( @ 21:58)  Osmolality, Random Urine: 284 mos/kg ( @ 21:58)                  RADIOLOGY & ADDITIONAL STUDIES: pt seen and examined.    SUBJECTIVE:  pt seen in dialysis and tolerating the dialysis well.  pt feels fine and denies cp,sob,gi or uremic  symptons    Current meds:    amLODIPine   Tablet 5 milliGRAM(s) Oral daily  aspirin enteric coated 81 milliGRAM(s) Oral daily  benztropine 0.5 milliGRAM(s) Oral two times a day  calcium acetate 667 milliGRAM(s) Oral three times a day with meals  dextrose 40% Gel 15 Gram(s) Oral once PRN  dextrose 5%. 1000 milliLiter(s) IV Continuous <Continuous>  dextrose 50% Injectable 12.5 Gram(s) IV Push once  dextrose 50% Injectable 25 Gram(s) IV Push once  dextrose 50% Injectable 25 Gram(s) IV Push once  epoetin seema Injectable 6000 Unit(s) SubCutaneous <User Schedule>  fluticasone propionate 50 MICROgram(s)/spray Nasal Spray 1 Spray(s) Both Nostrils two times a day  glucagon  Injectable 1 milliGRAM(s) IntraMuscular once PRN  heparin  Injectable 5000 Unit(s) SubCutaneous every 8 hours  insulin lispro (HumaLOG) corrective regimen sliding scale   SubCutaneous Before meals and at bedtime  levothyroxine 25 MICROGram(s) Oral daily  simvastatin 20 milliGRAM(s) Oral at bedtime  sodium chloride 0.9%. 1000 milliLiter(s) IV Continuous <Continuous>      Vital Signs    T(F): 98.2 (19 @ 16:10), Max: 99.3 (19 @ 23:19)  HR: 72 (19 @ 16:10) (60 - 78)  BP: 136/56 (19 @ 16:10) (118/67 - 157/87)  ABP: --  RR: 16 (19 @ 16:10) (16 - 20)  SpO2: 100% (19 @ 16:10) (96% - 100%)  Wt(kg): --  CVP(cm H2O): --  CO: --  PCWP: --    I and O's:     @ 07:01  -   @ 07:00  --------------------------------------------------------  IN:  Total IN: 0 mL    OUT:    Indwelling Catheter - Urethral: 3850 mL  Total OUT: 3850 mL    Total NET: -3850 mL       @ 07: @ 07:00  --------------------------------------------------------  IN:    Packed Red Blood Cells: 350 mL  Total IN: 350 mL    OUT:    Indwelling Catheter - Urethral: 2850 mL  Total OUT: 2850 mL    Total NET: -2500 mL       @ 07: @ 17:24  --------------------------------------------------------  IN:  Total IN: 0 mL    OUT:    Indwelling Catheter - Urethral: 800 mL  Total OUT: 800 mL    Total NET: -800 mL        Daily     Daily Weight in k (2019 20:11)    PHYSICAL EXAM:  Constitutional: well developed, well nourished  and in nad  HEENT: PERRLA,  no icteric sclera and mild pallor of conjunctiva noted  Neck: No JVD, thyromegaly or adenopathy  PC noted in Rt chest  Respiratory: reduced air entry lower lungs with no rales, wheezing or rhonchi  Cardiovascular: S1 and S2 normally heard  Gastrointestinal: soft, nondistended, nontender and normal bowel sounds heard  Extremities: No peripheral edema or cyanosis  Neurological: A/O x 3, no focal deficits  Skin: No rashes      LABS:    CBC:                          9.2    9.7   )-----------( 238      ( 2019 10:42 )             28.5           BMP:        141  |  111<H>  |  58<H>  ----------------------------<  119<H>  4.4   |  24  |  6.64<H>      138  |  111<H>  |  84<H>  ----------------------------<  114<H>  4.5   |  18<L>  |  7.91<H>      141  |  111<H>  |  85<H>  ----------------------------<  102<H>  4.3   |  18<L>  |  7.97<H>    Ca    8.6      2019 10:42  Ca    8.5      2019 07:09  Ca    8.8      2019 07:43  Phos  4.3       Phos  5.0       Phos  5.6       Mg     1.8       Mg     2.0         TPro  7.1  /  Alb  3.2<L>  /  TBili  0.3  /  DBili  x   /  AST  31  /  ALT  51  /  AlkPhos  160<H>    TPro  7.2  /  Alb  3.3<L>  /  TBili  0.2  /  DBili  x   /  AST  23  /  ALT  43  /  AlkPhos  142<H>    TPro  7.0  /  Alb  3.9  /  TBili  x   /  DBili  x   /  AST  x   /  ALT  x   /  AlkPhos  x             URINE STUDIES:  Sodium, Random Urine: 52 mmol/L ( @ 21:58)  Creatinine, Random Urine: 60 mg/dL ( @ 21:58)  Osmolality, Random Urine: 284 mos/kg ( @ 21:58)

## 2019-01-26 NOTE — PROGRESS NOTE ADULT - PROBLEM SELECTOR PLAN 1
- Cr of 7.74 and BUN of 58 on admission ; baseline back in Oct 2018 was Cr of 1.04 and BUN of 19  - Fena on admission was 4.9%; indicating post renal but no source noted on imaging. CT abdomen and US renal both unremarkable. Yoder in place and making good urine.   - ANCA, ASCA, KRYSTAL, Hepatitis panel, HIV, Complement levels, ASO, RF and SPEP negative   - f/u Cryoglobulin, Urine bence stahl, UPEP and beta 2 microglobulin   - Could be ATN, continue with hydration  - Since no improvement in creatinine over few days, will proceed with dialysis as per Nephro prior to kidney biopsy.   - Monitor BMP  - Avoid nephrotoxic medications   - Nephro follow up

## 2019-01-26 NOTE — PROGRESS NOTE ADULT - ASSESSMENT
ASSESSMENT AND PLAN:  1. DEISY : Etiology unclear as pt had near normal cr of 1.04 in oct 2018 , now ct of 7 to 7.9  DD include;ATN (most likely) ,AIN or RPGN (less likley as pts ua is benign on admission with mild ppriteinuria)  -pts egfr is poor and not getting better with iv hydration , non oliguric  -may need renal biopsy pending serology   -we will continue hd as discussed with pt and housestaff  -pt will get 2 hrs today ,3hrs tomorrow and then Monday  -we will plan for renal biopsy next week as per IR   -2. 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  -f/u bmp daily  -Keep patient euvolemic and renal diet  Avoid Nephrtoxic Meds/ Agents such as NSAIDs, Gadolinium contrast, Phosphate containing enemas, etc..)  Adjust Medications according to eGFR  3. 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 DEISY : Etiology unclear as pt had near normal cr of 1.04 in oct 2018 , now ct of 7 to 7.9  DD include;ATN (most likely) ,AIN or RPGN (less likley as pts ua is benign on admission with mild ppriteinuria)  -pt is being dialysed again today for 3hrs and will get 3hrs more on Monday to optimize renal status prior to renal biopsy   -may need renal biopsy pending serology next week  -2. 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  -f/u bmp daily  -Keep patient euvolemic and renal diet  Avoid Nephrtoxic Meds/ Agents such as NSAIDs, Gadolinium contrast, Phosphate containing enemas, etc..)  Adjust Medications according to eGFR  3. 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

## 2019-01-26 NOTE — PROGRESS NOTE ADULT - SUBJECTIVE AND OBJECTIVE BOX
PGY 1 Note discussed with supervising resident and primary attending    Patient is a 53y old  Female who presents with a chief complaint of vomiting (25 Jan 2019 16:46)      INTERVAL HPI/OVERNIGHT EVENTS: no acute events overnight, Pt reports no new complaints, pt seen in morning in no distress.     MEDICATIONS  (STANDING):  amLODIPine   Tablet 5 milliGRAM(s) Oral daily  aspirin enteric coated 81 milliGRAM(s) Oral daily  benztropine 0.5 milliGRAM(s) Oral two times a day  calcium acetate 667 milliGRAM(s) Oral three times a day with meals  dextrose 5%. 1000 milliLiter(s) (50 mL/Hr) IV Continuous <Continuous>  dextrose 50% Injectable 12.5 Gram(s) IV Push once  dextrose 50% Injectable 25 Gram(s) IV Push once  dextrose 50% Injectable 25 Gram(s) IV Push once  epoetin seema Injectable 6000 Unit(s) SubCutaneous <User Schedule>  fluticasone propionate 50 MICROgram(s)/spray Nasal Spray 1 Spray(s) Both Nostrils two times a day  heparin  Injectable 5000 Unit(s) SubCutaneous every 8 hours  insulin lispro (HumaLOG) corrective regimen sliding scale   SubCutaneous Before meals and at bedtime  iron sucrose IVPB 100 milliGRAM(s) IV Intermittent every 24 hours  levothyroxine 25 MICROGram(s) Oral daily  simvastatin 20 milliGRAM(s) Oral at bedtime  sodium chloride 0.9%. 1000 milliLiter(s) (100 mL/Hr) IV Continuous <Continuous>    MEDICATIONS  (PRN):  dextrose 40% Gel 15 Gram(s) Oral once PRN Blood Glucose LESS THAN 70 milliGRAM(s)/deciLiter  glucagon  Injectable 1 milliGRAM(s) IntraMuscular once PRN Glucose <70 milliGRAM(s)/deciLiter      __________________________________________________  REVIEW OF SYSTEMS:    CONSTITUTIONAL: No fever,   EYES: no acute visual disturbances  NECK: No pain or stiffness  RESPIRATORY: No cough; No shortness of breath  CARDIOVASCULAR: No chest pain, no palpitations  GASTROINTESTINAL: No pain. No nausea or vomiting; No diarrhea   NEUROLOGICAL: No headache or numbness, no tremors  MUSCULOSKELETAL: No joint pain, no muscle pain  GENITOURINARY: no dysuria, no frequency, no hesitancy  PSYCHIATRY: no depression , no anxiety  ALL OTHER  ROS negative        Vital Signs Last 24 Hrs  T(C): 36.6 (26 Jan 2019 07:52), Max: 37.4 (25 Jan 2019 23:19)  T(F): 97.9 (26 Jan 2019 07:52), Max: 99.3 (25 Jan 2019 23:19)  HR: 68 (26 Jan 2019 07:52) (60 - 78)  BP: 118/67 (26 Jan 2019 07:52) (118/67 - 157/87)  BP(mean): --  RR: 16 (26 Jan 2019 07:52) (16 - 20)  SpO2: 100% (26 Jan 2019 07:52) (96% - 100%)    ________________________________________________  PHYSICAL EXAM:  GENERAL: NAD  HEENT: Normocephalic;  conjunctivae and sclerae clear; moist mucous membranes;   NECK : supple  CHEST/LUNG: Clear to auscultation bilaterally with good air entry   HEART: S1 S2  regular; no murmurs, gallops or rubs  ABDOMEN: Soft, Nontender, Nondistended; Bowel sounds present  EXTREMITIES: no cyanosis; no edema; no calf tenderness  SKIN: warm and dry; no rash  NERVOUS SYSTEM:  Awake and alert; Oriented  to place, person and time ; no new deficits    _________________________________________________  LABS:                        7.2    7.6   )-----------( 227      ( 25 Jan 2019 07:09 )             23.2     01-25    138  |  111<H>  |  84<H>  ----------------------------<  114<H>  4.5   |  18<L>  |  7.91<H>    Ca    8.5      25 Jan 2019 07:09  Phos  5.0     01-25  Mg     2.0     01-25    TPro  7.1  /  Alb  3.2<L>  /  TBili  0.3  /  DBili  x   /  AST  31  /  ALT  51  /  AlkPhos  160<H>  01-25    PT/INR - ( 25 Jan 2019 07:09 )   PT: 10.6 sec;   INR: 0.96 ratio             CAPILLARY BLOOD GLUCOSE      POCT Blood Glucose.: 128 mg/dL (26 Jan 2019 08:19)  POCT Blood Glucose.: 135 mg/dL (25 Jan 2019 21:48)  POCT Blood Glucose.: 127 mg/dL (25 Jan 2019 17:36)  POCT Blood Glucose.: 135 mg/dL (25 Jan 2019 16:42)  POCT Blood Glucose.: 153 mg/dL (25 Jan 2019 11:54)        RADIOLOGY & ADDITIONAL TESTS:    Imaging Personally Reviewed:  YES    Consultant(s) Notes Reviewed:   YES    Care Discussed with Consultants : YES     Plan of care was discussed with patient and /or primary care giver; all questions and concerns were addressed and care was aligned with patient's wishes.

## 2019-01-27 LAB
ANION GAP SERPL CALC-SCNC: 10 MMOL/L — SIGNIFICANT CHANGE UP (ref 5–17)
BASOPHILS # BLD AUTO: 0.1 K/UL — SIGNIFICANT CHANGE UP (ref 0–0.2)
BASOPHILS NFR BLD AUTO: 1.3 % — SIGNIFICANT CHANGE UP (ref 0–2)
BUN SERPL-MCNC: 36 MG/DL — HIGH (ref 7–18)
CALCIUM SERPL-MCNC: 8.7 MG/DL — SIGNIFICANT CHANGE UP (ref 8.4–10.5)
CHLORIDE SERPL-SCNC: 103 MMOL/L — SIGNIFICANT CHANGE UP (ref 96–108)
CO2 SERPL-SCNC: 28 MMOL/L — SIGNIFICANT CHANGE UP (ref 22–31)
CREAT SERPL-MCNC: 4.73 MG/DL — HIGH (ref 0.5–1.3)
EOSINOPHIL # BLD AUTO: 0.3 K/UL — SIGNIFICANT CHANGE UP (ref 0–0.5)
EOSINOPHIL NFR BLD AUTO: 3.1 % — SIGNIFICANT CHANGE UP (ref 0–6)
GLUCOSE BLDC GLUCOMTR-MCNC: 189 MG/DL — HIGH (ref 70–99)
GLUCOSE BLDC GLUCOMTR-MCNC: 208 MG/DL — HIGH (ref 70–99)
GLUCOSE BLDC GLUCOMTR-MCNC: 225 MG/DL — HIGH (ref 70–99)
GLUCOSE BLDC GLUCOMTR-MCNC: 277 MG/DL — HIGH (ref 70–99)
GLUCOSE SERPL-MCNC: 171 MG/DL — HIGH (ref 70–99)
HCT VFR BLD CALC: 28.3 % — LOW (ref 34.5–45)
HGB BLD-MCNC: 9.2 G/DL — LOW (ref 11.5–15.5)
LYMPHOCYTES # BLD AUTO: 1.7 K/UL — SIGNIFICANT CHANGE UP (ref 1–3.3)
LYMPHOCYTES # BLD AUTO: 18.8 % — SIGNIFICANT CHANGE UP (ref 13–44)
MAGNESIUM SERPL-MCNC: 1.9 MG/DL — SIGNIFICANT CHANGE UP (ref 1.6–2.6)
MCHC RBC-ENTMCNC: 27.5 PG — SIGNIFICANT CHANGE UP (ref 27–34)
MCHC RBC-ENTMCNC: 32.5 GM/DL — SIGNIFICANT CHANGE UP (ref 32–36)
MCV RBC AUTO: 84.7 FL — SIGNIFICANT CHANGE UP (ref 80–100)
MONOCYTES # BLD AUTO: 0.9 K/UL — SIGNIFICANT CHANGE UP (ref 0–0.9)
MONOCYTES NFR BLD AUTO: 10.6 % — SIGNIFICANT CHANGE UP (ref 2–14)
NEUTROPHILS # BLD AUTO: 5.8 K/UL — SIGNIFICANT CHANGE UP (ref 1.8–7.4)
NEUTROPHILS NFR BLD AUTO: 66.3 % — SIGNIFICANT CHANGE UP (ref 43–77)
PHOSPHATE SERPL-MCNC: 3.7 MG/DL — SIGNIFICANT CHANGE UP (ref 2.5–4.5)
PLATELET # BLD AUTO: 235 K/UL — SIGNIFICANT CHANGE UP (ref 150–400)
POTASSIUM SERPL-MCNC: 4 MMOL/L — SIGNIFICANT CHANGE UP (ref 3.5–5.3)
POTASSIUM SERPL-SCNC: 4 MMOL/L — SIGNIFICANT CHANGE UP (ref 3.5–5.3)
RBC # BLD: 3.34 M/UL — LOW (ref 3.8–5.2)
RBC # FLD: 11.7 % — SIGNIFICANT CHANGE UP (ref 10.3–14.5)
SODIUM SERPL-SCNC: 141 MMOL/L — SIGNIFICANT CHANGE UP (ref 135–145)
WBC # BLD: 8.8 K/UL — SIGNIFICANT CHANGE UP (ref 3.8–10.5)
WBC # FLD AUTO: 8.8 K/UL — SIGNIFICANT CHANGE UP (ref 3.8–10.5)

## 2019-01-27 RX ORDER — POLYETHYLENE GLYCOL 3350 17 G/17G
17 POWDER, FOR SOLUTION ORAL ONCE
Qty: 0 | Refills: 0 | Status: COMPLETED | OUTPATIENT
Start: 2019-01-27 | End: 2019-01-28

## 2019-01-27 RX ADMIN — SIMVASTATIN 20 MILLIGRAM(S): 20 TABLET, FILM COATED ORAL at 21:30

## 2019-01-27 RX ADMIN — HEPARIN SODIUM 5000 UNIT(S): 5000 INJECTION INTRAVENOUS; SUBCUTANEOUS at 06:53

## 2019-01-27 RX ADMIN — Medication 25 MICROGRAM(S): at 06:54

## 2019-01-27 RX ADMIN — Medication 1: at 08:40

## 2019-01-27 RX ADMIN — SODIUM CHLORIDE 100 MILLILITER(S): 9 INJECTION INTRAMUSCULAR; INTRAVENOUS; SUBCUTANEOUS at 08:48

## 2019-01-27 RX ADMIN — Medication 2: at 21:29

## 2019-01-27 RX ADMIN — Medication 667 MILLIGRAM(S): at 18:10

## 2019-01-27 RX ADMIN — Medication 3: at 11:52

## 2019-01-27 RX ADMIN — Medication 1 SPRAY(S): at 18:10

## 2019-01-27 RX ADMIN — Medication 667 MILLIGRAM(S): at 11:52

## 2019-01-27 RX ADMIN — Medication 0.5 MILLIGRAM(S): at 18:10

## 2019-01-27 RX ADMIN — Medication 81 MILLIGRAM(S): at 11:52

## 2019-01-27 RX ADMIN — Medication 667 MILLIGRAM(S): at 08:42

## 2019-01-27 RX ADMIN — HEPARIN SODIUM 5000 UNIT(S): 5000 INJECTION INTRAVENOUS; SUBCUTANEOUS at 21:26

## 2019-01-27 RX ADMIN — Medication 2: at 16:30

## 2019-01-27 RX ADMIN — AMLODIPINE BESYLATE 5 MILLIGRAM(S): 2.5 TABLET ORAL at 06:53

## 2019-01-27 RX ADMIN — Medication 1 SPRAY(S): at 06:54

## 2019-01-27 RX ADMIN — Medication 0.5 MILLIGRAM(S): at 06:53

## 2019-01-27 NOTE — DIETITIAN INITIAL EVALUATION ADULT. - PROBLEM SELECTOR PLAN 4
On Glucophage and amyryl at home , will hold   Start hss   f/u a1c  PATIENT DOES NOT KNOW DOSES OF MEDS ,  PRIMARY TEAM TO OBTAIN

## 2019-01-27 NOTE — PROGRESS NOTE ADULT - ASSESSMENT
ASSESSMENT AND PLAN:  1. DEISY : Etiology unclear as pt had near normal cr of 1.04 in oct 2018 , now ct of 7 to 7.9  DD include;ATN (most likely) ,AIN or RPGN (less likley as pts ua is benign on admission with mild ppriteinuria)  -pt is being dialysed again today for 3hrs and will get 3hrs more on Monday to optimize renal status prior to renal biopsy   -may need renal biopsy pending serology next week  -2. 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  -f/u bmp daily  -Keep patient euvolemic and renal diet  Avoid Nephrtoxic Meds/ Agents such as NSAIDs, Gadolinium contrast, Phosphate containing enemas, etc..)  Adjust Medications according to eGFR  3. 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 DEISY : Etiology unclear as pt had near normal cr of 1.04 in oct 2018 , now ct of 7 to 7.9  DD include;ATN (most likely) ,AIN or RPGN (less likley as pts ua is benign on admission with mild proteinuria)  -pt will plan hd  Monday to optimize renal status prior to renal biopsy on Tues or Wed as per IR schedule  -2. R/O CKD due to Diabetic nephropathy  -pt has small rt.kidney ,normal size Left kidney , r/o ckd secondary to dileep-vascular ds secondary to dm/htn with non nephrotic proteinuria.  -f/u bmp daily  -Keep patient euvolemic and renal diet  Avoid Nephrtoxic Meds/ Agents such as NSAIDs, Gadolinium contrast, Phosphate containing enemas, etc..)  Adjust Medications according to eGFR  3. 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

## 2019-01-27 NOTE — DIETITIAN INITIAL EVALUATION ADULT. - PROBLEM SELECTOR PLAN 3
Hb of 8.8 with normal MCV   Likely anemia in chronic dz  Would get anemia panel   No signs of active bleeding   Occult with next BM  Refused occult on admission  Monitor cbc daily

## 2019-01-27 NOTE — DIETITIAN INITIAL EVALUATION ADULT. - OTHER INFO
Pt visited. Pt seen For LOS= 8 days. Pt is from [facility].  Observed Pt eating Lunch meal. Eating slowly. D/W RN Pt eating good. Pt is  on Dialysis at Present.  per [facility] & P  Pt with  Slight lethargic for few months and  Poor po intake X??? . Pt not sure of any  weight loss.

## 2019-01-27 NOTE — PROGRESS NOTE ADULT - SUBJECTIVE AND OBJECTIVE BOX
pt seen and examined.pts current chart reviewed and case discussed with resident covering.    SUBJECTIVE:  pt feels fine and denies cp,sob,gi or gu/uremic  symptons    REVIEW OF SYSTEMS:  CONSTITUTIONAL: No weakness, fevers or chills  EYES/ENT: No visual changes;  No vertigo or throat pain   NECK: No pain or stiffness  RESPIRATORY: No cough, wheezing, hemoptysis; No shortness of breath  CARDIOVASCULAR: No chest pain or palpitations  GASTROINTESTINAL: No abdominal or epigastric pain. No nausea, vomiting, or hematemesis; No diarrhea or constipation. No melena or hematochezia.  GENITOURINARY: No dysuria, frequency , hematuria, flank pain or nocturia  NEUROLOGICAL: No numbness or weakness  SKIN: No itching, burning, rashes, or lesions   All other review of systems is negative unless indicated above    Current meds:    amLODIPine   Tablet 5 milliGRAM(s) Oral daily  aspirin enteric coated 81 milliGRAM(s) Oral daily  benztropine 0.5 milliGRAM(s) Oral two times a day  calcium acetate 667 milliGRAM(s) Oral three times a day with meals  dextrose 40% Gel 15 Gram(s) Oral once PRN  dextrose 5%. 1000 milliLiter(s) IV Continuous <Continuous>  dextrose 50% Injectable 12.5 Gram(s) IV Push once  dextrose 50% Injectable 25 Gram(s) IV Push once  dextrose 50% Injectable 25 Gram(s) IV Push once  epoetin seema Injectable 6000 Unit(s) SubCutaneous <User Schedule>  fluticasone propionate 50 MICROgram(s)/spray Nasal Spray 1 Spray(s) Both Nostrils two times a day  glucagon  Injectable 1 milliGRAM(s) IntraMuscular once PRN  heparin  Injectable 5000 Unit(s) SubCutaneous every 8 hours  insulin lispro (HumaLOG) corrective regimen sliding scale   SubCutaneous Before meals and at bedtime  levothyroxine 25 MICROGram(s) Oral daily  simvastatin 20 milliGRAM(s) Oral at bedtime  sodium chloride 0.9%. 1000 milliLiter(s) IV Continuous <Continuous>      Vital Signs    T(F): 98.3 (19 @ 07:31), Max: 98.5 (19 @ 00:21)  HR: 67 (19 @ 07:31) (62 - 623)  BP: 111/52 (19 @ 07:31) (111/52 - 144/76)  ABP: --  RR: 17 (19 @ 07:31) (16 - 18)  SpO2: 100% (19 @ 07:31) (100% - 100%)  Wt(kg): --  CVP(cm H2O): --  CO: --  PCWP: --    I and O's:     @ 07:01  -   @ 07:00  --------------------------------------------------------  IN:    Packed Red Blood Cells: 350 mL  Total IN: 350 mL    OUT:    Indwelling Catheter - Urethral: 2850 mL  Total OUT: 2850 mL    Total NET: -2500 mL       @ 07:01  -   @ 07:00  --------------------------------------------------------  IN:  Total IN: 0 mL    OUT:    Indwelling Catheter - Urethral: 2200 mL  Total OUT: 2200 mL    Total NET: -2200 mL       @ 07:  -   @ 14:47  --------------------------------------------------------  IN:    sodium chloride 0.9%.: 100 mL  Total IN: 100 mL    OUT:  Total OUT: 0 mL    Total NET: 100 mL        Daily     Daily Weight in k.5 (2019 12:06)    PHYSICAL EXAM:  Constitutional: well developed, well nourished  and in nad  HEENT: PERRLA,  no icteric sclera and mild pallor of conjunctiva noted  Neck: No JVD, thyromegaly or adenopathy  PC noted in Rt chest  Respiratory: reduced air entry lower lungs with no rales, wheezing or rhonchi  Cardiovascular: S1 and S2 normally heard  Gastrointestinal: soft, nondistended, nontender and normal bowel sounds heard  Extremities: No peripheral edema or cyanosis  Neurological: A/O x 3, no focal deficits  Skin: No rashes      LABS:    CBC:                          9.2    8.8   )-----------( 235      ( 2019 05:59 )             28.3           BMP:        141  |  103  |  36<H>  ----------------------------<  171<H>  4.0   |  28  |  4.73<H>      141  |  111<H>  |  58<H>  ----------------------------<  119<H>  4.4   |  24  |  6.64<H>      138  |  111<H>  |  84<H>  ----------------------------<  114<H>  4.5   |  18<L>  |  7.91<H>    Ca    8.7      2019 05:59  Ca    8.6      2019 10:42  Ca    8.5      2019 07:09  Phos  3.7       Phos  4.3       Phos  5.0       Mg     1.9       Mg     1.8       Mg     2.0         TPro  7.1  /  Alb  3.2<L>  /  TBili  0.3  /  DBili  x   /  AST  31  /  ALT  51  /  AlkPhos  160<H>            URINE STUDIES:                        RADIOLOGY & ADDITIONAL STUDIES: pt seen and examined.    SUBJECTIVE:  pt feels fine and denies cp,sob,gi or uremic  symptons  u/o good via del cid cath  Current meds:    amLODIPine   Tablet 5 milliGRAM(s) Oral daily  aspirin enteric coated 81 milliGRAM(s) Oral daily  benztropine 0.5 milliGRAM(s) Oral two times a day  calcium acetate 667 milliGRAM(s) Oral three times a day with meals  dextrose 40% Gel 15 Gram(s) Oral once PRN  dextrose 5%. 1000 milliLiter(s) IV Continuous <Continuous>  dextrose 50% Injectable 12.5 Gram(s) IV Push once  dextrose 50% Injectable 25 Gram(s) IV Push once  dextrose 50% Injectable 25 Gram(s) IV Push once  epoetin seema Injectable 6000 Unit(s) SubCutaneous <User Schedule>  fluticasone propionate 50 MICROgram(s)/spray Nasal Spray 1 Spray(s) Both Nostrils two times a day  glucagon  Injectable 1 milliGRAM(s) IntraMuscular once PRN  heparin  Injectable 5000 Unit(s) SubCutaneous every 8 hours  insulin lispro (HumaLOG) corrective regimen sliding scale   SubCutaneous Before meals and at bedtime  levothyroxine 25 MICROGram(s) Oral daily  simvastatin 20 milliGRAM(s) Oral at bedtime  sodium chloride 0.9%. 1000 milliLiter(s) IV Continuous <Continuous>      Vital Signs    T(F): 98.3 (19 @ 07:31), Max: 98.5 (19 @ 00:21)  HR: 67 (19 @ 07:31) (62 - 623)  BP: 111/52 (19 @ 07:31) (111/52 - 144/76)  ABP: --  RR: 17 (19 @ 07:31) (16 - 18)  SpO2: 100% (19 @ 07:31) (100% - 100%)  Wt(kg): --  CVP(cm H2O): --  CO: --  PCWP: --    I and O's:     @ 07:01  -   @ 07:00  --------------------------------------------------------  IN:    Packed Red Blood Cells: 350 mL  Total IN: 350 mL    OUT:    Indwelling Catheter - Urethral: 2850 mL  Total OUT: 2850 mL    Total NET: -2500 mL       @ 07: @ 07:00  --------------------------------------------------------  IN:  Total IN: 0 mL    OUT:    Indwelling Catheter - Urethral: 2200 mL  Total OUT: 2200 mL    Total NET: -2200 mL       @ 07: @ 14:47  --------------------------------------------------------  IN:    sodium chloride 0.9%.: 100 mL  Total IN: 100 mL    OUT:  Total OUT: 0 mL    Total NET: 100 mL        Daily     Daily Weight in k.5 (2019 12:06)    PHYSICAL EXAM:  Constitutional: well developed, well nourished  and in nad  HEENT: PERRLA,  no icteric sclera and mild pallor of conjunctiva noted  Neck: No JVD, thyromegaly or adenopathy  PC noted in Rt chest  Respiratory: reduced air entry lower lungs with no rales, wheezing or rhonchi  Cardiovascular: S1 and S2 normally heard  Gastrointestinal: soft, nondistended, nontender and normal bowel sounds heard  Extremities: No peripheral edema or cyanosis  Neurological: A/O x 3, no focal deficits  Skin: No rashes      LABS:    CBC:                          9.2    8.8   )-----------( 235      ( 2019 05:59 )             28.3           BMP:        141  |  103  |  36<H>  ----------------------------<  171<H>  4.0   |  28  |  4.73<H>      141  |  111<H>  |  58<H>  ----------------------------<  119<H>  4.4   |  24  |  6.64<H>      138  |  111<H>  |  84<H>  ----------------------------<  114<H>  4.5   |  18<L>  |  7.91<H>    Ca    8.7      2019 05:59  Ca    8.6      2019 10:42  Ca    8.5      2019 07:09  Phos  3.7       Phos  4.3       Phos  5.0       Mg     1.9       Mg     1.8       Mg     2.0         TPro  7.1  /  Alb  3.2<L>  /  TBili  0.3  /  DBili  x   /  AST  31  /  ALT  51  /  AlkPhos  160<H>

## 2019-01-28 LAB
% GAMMA, URINE: 9.8 % — SIGNIFICANT CHANGE UP
ALBUMIN 24H MFR UR ELPH: 15.2 % — SIGNIFICANT CHANGE UP
ALBUMIN SERPL ELPH-MCNC: 3.1 G/DL — LOW (ref 3.5–5)
ALP SERPL-CCNC: 174 U/L — HIGH (ref 40–120)
ALPHA1 GLOB 24H MFR UR ELPH: 21 % — SIGNIFICANT CHANGE UP
ALPHA2 GLOB 24H MFR UR ELPH: 13.6 % — SIGNIFICANT CHANGE UP
ALT FLD-CCNC: 50 U/L DA — SIGNIFICANT CHANGE UP (ref 10–60)
ANION GAP SERPL CALC-SCNC: 11 MMOL/L — SIGNIFICANT CHANGE UP (ref 5–17)
AST SERPL-CCNC: 27 U/L — SIGNIFICANT CHANGE UP (ref 10–40)
B-GLOBULIN 24H MFR UR ELPH: 40.4 % — SIGNIFICANT CHANGE UP
BASOPHILS # BLD AUTO: 0.1 K/UL — SIGNIFICANT CHANGE UP (ref 0–0.2)
BASOPHILS NFR BLD AUTO: 1.4 % — SIGNIFICANT CHANGE UP (ref 0–2)
BILIRUB SERPL-MCNC: 0.2 MG/DL — SIGNIFICANT CHANGE UP (ref 0.2–1.2)
BUN SERPL-MCNC: 55 MG/DL — HIGH (ref 7–18)
CALCIUM SERPL-MCNC: 8.9 MG/DL — SIGNIFICANT CHANGE UP (ref 8.4–10.5)
CHLORIDE SERPL-SCNC: 108 MMOL/L — SIGNIFICANT CHANGE UP (ref 96–108)
CO2 SERPL-SCNC: 21 MMOL/L — LOW (ref 22–31)
COLLECT DURATION TIME UR: 24 HR — SIGNIFICANT CHANGE UP
CREAT SERPL-MCNC: 6.99 MG/DL — HIGH (ref 0.5–1.3)
CRYOGLOB SERPL-MCNC: NEGATIVE — SIGNIFICANT CHANGE UP
EOSINOPHIL # BLD AUTO: 0.3 K/UL — SIGNIFICANT CHANGE UP (ref 0–0.5)
EOSINOPHIL NFR BLD AUTO: 3.6 % — SIGNIFICANT CHANGE UP (ref 0–6)
GLUCOSE BLDC GLUCOMTR-MCNC: 153 MG/DL — HIGH (ref 70–99)
GLUCOSE BLDC GLUCOMTR-MCNC: 230 MG/DL — HIGH (ref 70–99)
GLUCOSE BLDC GLUCOMTR-MCNC: 247 MG/DL — HIGH (ref 70–99)
GLUCOSE BLDC GLUCOMTR-MCNC: 298 MG/DL — HIGH (ref 70–99)
GLUCOSE SERPL-MCNC: 139 MG/DL — HIGH (ref 70–99)
HCT VFR BLD CALC: 26.6 % — LOW (ref 34.5–45)
HGB BLD-MCNC: 8.6 G/DL — LOW (ref 11.5–15.5)
INTERPRETATION 24H UR IFE-IMP: SIGNIFICANT CHANGE UP
LYMPHOCYTES # BLD AUTO: 1.9 K/UL — SIGNIFICANT CHANGE UP (ref 1–3.3)
LYMPHOCYTES # BLD AUTO: 25.6 % — SIGNIFICANT CHANGE UP (ref 13–44)
M PROTEIN 24H UR ELPH-MRATE: 0 MG/24HR — SIGNIFICANT CHANGE UP (ref 0–0)
M PROTEIN 24H UR ELPH-MRATE: 0 MG/DL — SIGNIFICANT CHANGE UP
MAGNESIUM SERPL-MCNC: 2.1 MG/DL — SIGNIFICANT CHANGE UP (ref 1.6–2.6)
MCHC RBC-ENTMCNC: 27.3 PG — SIGNIFICANT CHANGE UP (ref 27–34)
MCHC RBC-ENTMCNC: 32.2 GM/DL — SIGNIFICANT CHANGE UP (ref 32–36)
MCV RBC AUTO: 84.9 FL — SIGNIFICANT CHANGE UP (ref 80–100)
MONOCYTES # BLD AUTO: 0.9 K/UL — SIGNIFICANT CHANGE UP (ref 0–0.9)
MONOCYTES NFR BLD AUTO: 12.6 % — SIGNIFICANT CHANGE UP (ref 2–14)
NEUTROPHILS # BLD AUTO: 4.3 K/UL — SIGNIFICANT CHANGE UP (ref 1.8–7.4)
NEUTROPHILS NFR BLD AUTO: 56.8 % — SIGNIFICANT CHANGE UP (ref 43–77)
PHOSPHATE SERPL-MCNC: 5 MG/DL — HIGH (ref 2.5–4.5)
PLATELET # BLD AUTO: 230 K/UL — SIGNIFICANT CHANGE UP (ref 150–400)
POTASSIUM SERPL-MCNC: 4.3 MMOL/L — SIGNIFICANT CHANGE UP (ref 3.5–5.3)
POTASSIUM SERPL-SCNC: 4.3 MMOL/L — SIGNIFICANT CHANGE UP (ref 3.5–5.3)
PROT ?TM UR-MCNC: 44 MG/DL — HIGH (ref 0–12)
PROT PATTERN 24H UR ELPH-IMP: SIGNIFICANT CHANGE UP
PROT SERPL-MCNC: 7.2 G/DL — SIGNIFICANT CHANGE UP (ref 6–8.3)
PROTEIN QUANT CALC, URINE: 638 MG/24 H — HIGH (ref 50–100)
RBC # BLD: 3.14 M/UL — LOW (ref 3.8–5.2)
RBC # FLD: 12.4 % — SIGNIFICANT CHANGE UP (ref 10.3–14.5)
SODIUM SERPL-SCNC: 140 MMOL/L — SIGNIFICANT CHANGE UP (ref 135–145)
TOTAL VOLUME - 24 HOUR: 1450 ML — SIGNIFICANT CHANGE UP
URINE CREATININE CALCULATION: 0.4 G/24 H — LOW (ref 0.8–1.8)
WBC # BLD: 7.5 K/UL — SIGNIFICANT CHANGE UP (ref 3.8–10.5)
WBC # FLD AUTO: 7.5 K/UL — SIGNIFICANT CHANGE UP (ref 3.8–10.5)

## 2019-01-28 RX ADMIN — Medication 2: at 12:26

## 2019-01-28 RX ADMIN — AMLODIPINE BESYLATE 5 MILLIGRAM(S): 2.5 TABLET ORAL at 06:25

## 2019-01-28 RX ADMIN — Medication 0.5 MILLIGRAM(S): at 17:03

## 2019-01-28 RX ADMIN — Medication 0.5 MILLIGRAM(S): at 06:25

## 2019-01-28 RX ADMIN — SIMVASTATIN 20 MILLIGRAM(S): 20 TABLET, FILM COATED ORAL at 21:42

## 2019-01-28 RX ADMIN — Medication 2: at 21:39

## 2019-01-28 RX ADMIN — HEPARIN SODIUM 5000 UNIT(S): 5000 INJECTION INTRAVENOUS; SUBCUTANEOUS at 06:26

## 2019-01-28 RX ADMIN — Medication 667 MILLIGRAM(S): at 09:03

## 2019-01-28 RX ADMIN — Medication 667 MILLIGRAM(S): at 12:25

## 2019-01-28 RX ADMIN — Medication 81 MILLIGRAM(S): at 12:25

## 2019-01-28 RX ADMIN — Medication 25 MICROGRAM(S): at 06:25

## 2019-01-28 RX ADMIN — HEPARIN SODIUM 5000 UNIT(S): 5000 INJECTION INTRAVENOUS; SUBCUTANEOUS at 21:41

## 2019-01-28 RX ADMIN — POLYETHYLENE GLYCOL 3350 17 GRAM(S): 17 POWDER, FOR SOLUTION ORAL at 06:27

## 2019-01-28 RX ADMIN — Medication 1 SPRAY(S): at 06:25

## 2019-01-28 RX ADMIN — HEPARIN SODIUM 5000 UNIT(S): 5000 INJECTION INTRAVENOUS; SUBCUTANEOUS at 13:04

## 2019-01-28 RX ADMIN — Medication 667 MILLIGRAM(S): at 17:02

## 2019-01-28 RX ADMIN — Medication 1 SPRAY(S): at 17:03

## 2019-01-28 RX ADMIN — TUBERCULIN PURIFIED PROTEIN DERIVATIVE 5 UNIT(S): 5 INJECTION, SOLUTION INTRADERMAL at 12:57

## 2019-01-28 RX ADMIN — Medication 1: at 09:03

## 2019-01-28 RX ADMIN — Medication 3: at 17:17

## 2019-01-28 NOTE — PROGRESS NOTE ADULT - PROBLEM SELECTOR PLAN 1
- Cr of 7.74 and BUN of 58 on admission ; baseline back in Oct 2018 was Cr of 1.04 and BUN of 19  - ANCA, ASCA, KRYSTAL, Hepatitis panel, HIV, Complement levels, ASO, RF and SPEP negative   - Urine bence stahl still testing   - Since no improvement in creatinine over few days, started on new dialysis via permacath   - IR guided Kidney biopsy possibly on wed or Fri   - PPD negative , social work consulted to set up for outpatient dialysis   - Monitor BMP  - Avoid nephrotoxic medications   - Nephro follow up

## 2019-01-28 NOTE — PROGRESS NOTE ADULT - SUBJECTIVE AND OBJECTIVE BOX
Resident Note discussed with  primary attending    Patient is a 53y old  Female who presents with a chief complaint of vomiting (27 Jan 2019 14:47)      INTERVAL HPI/ OVERNIGHT EVENTS: patient received 2 sessions of dialysis so far. Next due tomorrow. Spoke to IR for kidney biopsy, possible for Wed or Friday.     MEDICATIONS  (STANDING):  amLODIPine   Tablet 5 milliGRAM(s) Oral daily  aspirin enteric coated 81 milliGRAM(s) Oral daily  benztropine 0.5 milliGRAM(s) Oral two times a day  calcium acetate 667 milliGRAM(s) Oral three times a day with meals  dextrose 5%. 1000 milliLiter(s) (50 mL/Hr) IV Continuous <Continuous>  dextrose 50% Injectable 12.5 Gram(s) IV Push once  dextrose 50% Injectable 25 Gram(s) IV Push once  dextrose 50% Injectable 25 Gram(s) IV Push once  epoetin seema Injectable 6000 Unit(s) SubCutaneous <User Schedule>  fluticasone propionate 50 MICROgram(s)/spray Nasal Spray 1 Spray(s) Both Nostrils two times a day  heparin  Injectable 5000 Unit(s) SubCutaneous every 8 hours  insulin lispro (HumaLOG) corrective regimen sliding scale   SubCutaneous Before meals and at bedtime  levothyroxine 25 MICROGram(s) Oral daily  simvastatin 20 milliGRAM(s) Oral at bedtime    MEDICATIONS  (PRN):  dextrose 40% Gel 15 Gram(s) Oral once PRN Blood Glucose LESS THAN 70 milliGRAM(s)/deciLiter  glucagon  Injectable 1 milliGRAM(s) IntraMuscular once PRN Glucose <70 milliGRAM(s)/deciLiter      __________________________________________________  REVIEW OF SYSTEMS:    CONSTITUTIONAL: No fever  EYES: no acute visual disturbances  NECK: No pain or stiffness  RESPIRATORY: No cough; No shortness of breath  CARDIOVASCULAR: No chest pain, no palpitations  GASTROINTESTINAL: No pain. No nausea or vomiting; No diarrhea   NEUROLOGICAL: No headache or numbness, no tremors  MUSCULOSKELETAL: No joint pain, no muscle pain  GENITOURINARY: no dysuria, no frequency, no hesitancy  PSYCHIATRY: no depression , no anxiety  ALL OTHER  ROS negative        Vital Signs Last 24 Hrs  T(C): 36.7 (28 Jan 2019 07:34), Max: 37.2 (27 Jan 2019 16:10)  T(F): 98.1 (28 Jan 2019 07:34), Max: 98.9 (27 Jan 2019 16:10)  HR: 97 (28 Jan 2019 07:34) (65 - 100)  BP: 131/92 (28 Jan 2019 07:34) (120/59 - 152/63)  BP(mean): --  RR: 18 (28 Jan 2019 07:34) (16 - 18)  SpO2: 101% (28 Jan 2019 07:34) (100% - 101%)    ________________________________________________  PHYSICAL EXAM:  GENERAL: NAD  HEENT: Normocephalic;  conjunctivae and sclerae clear; moist mucous membranes;   NECK : supple  CHEST/LUNG: Clear to auscultation bilaterally with good air entry   HEART: S1 S2  regular; no murmurs, gallops or rubs  ABDOMEN: Soft, Nontender, Nondistended; Bowel sounds present  EXTREMITIES: no cyanosis; no edema; no calf tenderness  NERVOUS SYSTEM:  Awake and alert; Oriented  to place, person and time ; no new deficits    _________________________________________________  LABS:                        8.6    7.5   )-----------( 230      ( 28 Jan 2019 07:05 )             26.6     01-28    140  |  108  |  55<H>  ----------------------------<  139<H>  4.3   |  21<L>  |  6.99<H>    Ca    8.9      28 Jan 2019 07:05  Phos  5.0     01-28  Mg     2.1     01-28    TPro  7.2  /  Alb  3.1<L>  /  TBili  0.2  /  DBili  x   /  AST  27  /  ALT  50  /  AlkPhos  174<H>  01-28        CAPILLARY BLOOD GLUCOSE      POCT Blood Glucose.: 153 mg/dL (28 Jan 2019 08:34)  POCT Blood Glucose.: 208 mg/dL (27 Jan 2019 21:09)  POCT Blood Glucose.: 225 mg/dL (27 Jan 2019 16:29)        Consultant(s) Notes Reviewed:   YES    Care Discussed with Consultants : YES    Plan of care was discussed with patient and /or primary care giver; all questions and concerns were addressed and care was aligned with patient's wishes.

## 2019-01-28 NOTE — PROGRESS NOTE ADULT - SUBJECTIVE AND OBJECTIVE BOX
pt seen and examined.pts current chart reviewed and case discussed with resident covering.    SUBJECTIVE:  pt feels fine and denies cp,sob,gi or gu/uremic  symptons    REVIEW OF SYSTEMS:  CONSTITUTIONAL: No weakness, fevers or chills  EYES/ENT: No visual changes;  No vertigo or throat pain   NECK: No pain or stiffness  RESPIRATORY: No cough, wheezing, hemoptysis; No shortness of breath  CARDIOVASCULAR: No chest pain or palpitations  GASTROINTESTINAL: No abdominal or epigastric pain. No nausea, vomiting, or hematemesis; No diarrhea or constipation. No melena or hematochezia.  GENITOURINARY: No dysuria, frequency , hematuria, flank pain or nocturia  NEUROLOGICAL: No numbness or weakness  SKIN: No itching, burning, rashes, or lesions   All other review of systems is negative unless indicated above    Current meds:    amLODIPine   Tablet 5 milliGRAM(s) Oral daily  aspirin enteric coated 81 milliGRAM(s) Oral daily  benztropine 0.5 milliGRAM(s) Oral two times a day  calcium acetate 667 milliGRAM(s) Oral three times a day with meals  dextrose 40% Gel 15 Gram(s) Oral once PRN  dextrose 5%. 1000 milliLiter(s) IV Continuous <Continuous>  dextrose 50% Injectable 12.5 Gram(s) IV Push once  dextrose 50% Injectable 25 Gram(s) IV Push once  dextrose 50% Injectable 25 Gram(s) IV Push once  epoetin seema Injectable 6000 Unit(s) SubCutaneous <User Schedule>  fluticasone propionate 50 MICROgram(s)/spray Nasal Spray 1 Spray(s) Both Nostrils two times a day  glucagon  Injectable 1 milliGRAM(s) IntraMuscular once PRN  heparin  Injectable 5000 Unit(s) SubCutaneous every 8 hours  insulin lispro (HumaLOG) corrective regimen sliding scale   SubCutaneous Before meals and at bedtime  levothyroxine 25 MICROGram(s) Oral daily  simvastatin 20 milliGRAM(s) Oral at bedtime      Vital Signs    T(F): 98.1 (01-28-19 @ 07:34), Max: 98.9 (01-27-19 @ 16:10)  HR: 97 (01-28-19 @ 07:34) (65 - 100)  BP: 131/92 (01-28-19 @ 07:34) (120/59 - 152/63)  ABP: --  RR: 18 (01-28-19 @ 07:34) (16 - 18)  SpO2: 101% (01-28-19 @ 07:34) (100% - 101%)  Wt(kg): --  CVP(cm H2O): --  CO: --  PCWP: --    I and O's:    01-26 @ 07:01  -  01-27 @ 07:00  --------------------------------------------------------  IN:  Total IN: 0 mL    OUT:    Indwelling Catheter - Urethral: 2200 mL  Total OUT: 2200 mL    Total NET: -2200 mL      01-27 @ 07:01  -  01-28 @ 07:00  --------------------------------------------------------  IN:    sodium chloride 0.9%: 100 mL  Total IN: 100 mL    OUT:    Indwelling Catheter - Urethral: 1300 mL  Total OUT: 1300 mL    Total NET: -1200 mL      01-28 @ 07:01  -  01-28 @ 13:56  --------------------------------------------------------  IN:  Total IN: 0 mL    OUT:    Indwelling Catheter - Urethral: 1000 mL    Voided: 100 mL  Total OUT: 1100 mL    Total NET: -1100 mL        Daily     Daily     PHYSICAL EXAM:  Constitutional: well developed, well nourished  and in nad  HEENT: PERRLA,  no icteric sclera and mild pallor of conjunctiva noted  Neck: No JVD, thyromegaly or adenopathy  PC noted in Rt chest  Respiratory: reduced air entry lower lungs with no rales, wheezing or rhonchi  Cardiovascular: S1 and S2 normally heard  Gastrointestinal: soft, nondistended, nontender and normal bowel sounds heard  Extremities: No peripheral edema or cyanosis  Neurological: A/O x 3, no focal deficits  Skin: No rashes      LABS:    CBC:                          8.6    7.5   )-----------( 230      ( 28 Jan 2019 07:05 )             26.6           BMP:    01-28    140  |  108  |  55<H>  ----------------------------<  139<H>  4.3   |  21<L>  |  6.99<H>  01-27    141  |  103  |  36<H>  ----------------------------<  171<H>  4.0   |  28  |  4.73<H>  01-26    141  |  111<H>  |  58<H>  ----------------------------<  119<H>  4.4   |  24  |  6.64<H>    Ca    8.9      28 Jan 2019 07:05  Ca    8.7      27 Jan 2019 05:59  Ca    8.6      26 Jan 2019 10:42  Phos  5.0     01-28  Phos  3.7     01-27  Phos  4.3     01-26  Mg     2.1     01-28  Mg     1.9     01-27  Mg     1.8     01-26    TPro  7.2  /  Alb  3.1<L>  /  TBili  0.2  /  DBili  x   /  AST  27  /  ALT  50  /  AlkPhos  174<H>  01-28          URINE STUDIES:                        RADIOLOGY & ADDITIONAL STUDIES: pt seen and examined.    SUBJECTIVE:  pt feels fine and denies cp,sob,gi or uremic  symptons  pts del cid cath has been d/choco today.pt is voiding normally   pt is awaiting for renal biopsy by IR on wed or friday  pt will be dialysed tomorrow  Current meds:    amLODIPine   Tablet 5 milliGRAM(s) Oral daily  aspirin enteric coated 81 milliGRAM(s) Oral daily  benztropine 0.5 milliGRAM(s) Oral two times a day  calcium acetate 667 milliGRAM(s) Oral three times a day with meals  dextrose 40% Gel 15 Gram(s) Oral once PRN  dextrose 5%. 1000 milliLiter(s) IV Continuous <Continuous>  dextrose 50% Injectable 12.5 Gram(s) IV Push once  dextrose 50% Injectable 25 Gram(s) IV Push once  dextrose 50% Injectable 25 Gram(s) IV Push once  epoetin seema Injectable 6000 Unit(s) SubCutaneous <User Schedule>  fluticasone propionate 50 MICROgram(s)/spray Nasal Spray 1 Spray(s) Both Nostrils two times a day  glucagon  Injectable 1 milliGRAM(s) IntraMuscular once PRN  heparin  Injectable 5000 Unit(s) SubCutaneous every 8 hours  insulin lispro (HumaLOG) corrective regimen sliding scale   SubCutaneous Before meals and at bedtime  levothyroxine 25 MICROGram(s) Oral daily  simvastatin 20 milliGRAM(s) Oral at bedtime      Vital Signs    T(F): 98.1 (01-28-19 @ 07:34), Max: 98.9 (01-27-19 @ 16:10)  HR: 97 (01-28-19 @ 07:34) (65 - 100)  BP: 131/92 (01-28-19 @ 07:34) (120/59 - 152/63)  ABP: --  RR: 18 (01-28-19 @ 07:34) (16 - 18)  SpO2: 101% (01-28-19 @ 07:34) (100% - 101%)  Wt(kg): --  CVP(cm H2O): --  CO: --  PCWP: --    I and O's:    01-26 @ 07:01  -  01-27 @ 07:00  --------------------------------------------------------  IN:  Total IN: 0 mL    OUT:    Indwelling Catheter - Urethral: 2200 mL  Total OUT: 2200 mL    Total NET: -2200 mL      01-27 @ 07:01 - 01-28 @ 07:00  --------------------------------------------------------  IN:    sodium chloride 0.9%: 100 mL  Total IN: 100 mL    OUT:    Indwelling Catheter - Urethral: 1300 mL  Total OUT: 1300 mL    Total NET: -1200 mL      01-28 @ 07:01 - 01-28 @ 13:56  --------------------------------------------------------  IN:  Total IN: 0 mL    OUT:    Indwelling Catheter - Urethral: 1000 mL    Voided: 100 mL  Total OUT: 1100 mL    Total NET: -1100 mL        Daily     Daily     PHYSICAL EXAM:  Constitutional: well developed, well nourished  and in nad  HEENT: PERRLA,  no icteric sclera and mild pallor of conjunctiva noted  Neck: No JVD, thyromegaly or adenopathy  PC noted in Rt chest  Respiratory: reduced air entry lower lungs with no rales, wheezing or rhonchi  Cardiovascular: S1 and S2 normally heard  Gastrointestinal: soft, nondistended, nontender and normal bowel sounds heard  Extremities: No peripheral edema or cyanosis  Neurological: A/O x 3, no focal deficits  Skin: No rashes      LABS:    CBC:                          8.6    7.5   )-----------( 230      ( 28 Jan 2019 07:05 )             26.6           BMP:    01-28    140  |  108  |  55<H>  ----------------------------<  139<H>  4.3   |  21<L>  |  6.99<H>  01-27    141  |  103  |  36<H>  ----------------------------<  171<H>  4.0   |  28  |  4.73<H>  01-26    141  |  111<H>  |  58<H>  ----------------------------<  119<H>  4.4   |  24  |  6.64<H>    Ca    8.9      28 Jan 2019 07:05  Ca    8.7      27 Jan 2019 05:59  Ca    8.6      26 Jan 2019 10:42  Phos  5.0     01-28  Phos  3.7     01-27  Phos  4.3     01-26  Mg     2.1     01-28  Mg     1.9     01-27  Mg     1.8     01-26    TPro  7.2  /  Alb  3.1<L>  /  TBili  0.2  /  DBili  x   /  AST  27  /  ALT  50  /  AlkPhos  174<H>  01-28

## 2019-01-28 NOTE — PROGRESS NOTE ADULT - ASSESSMENT
53 female from assisted living with PMH of DM, HLD, Schizophrenia came with complaint of vomiting, difficult to arouse and sweating. Per EMS note and Assisted living papers , patient  has been noted to be slightly lethargic since few months now with poor po intake. No history of renal failure. Patient noted to have acute renal failure with Cr of 7.74 and BUN of 58. Admitted for further evaluation and management.   Started on new dialysis last Friday via Permacath. PPD and Hepatitis panel negative.   Plan for IR guided kidney biopsy on Wed or Fri as per their schedule.

## 2019-01-28 NOTE — PROGRESS NOTE ADULT - ASSESSMENT
ASSESSMENT AND PLAN:  1. DEISY : Etiology unclear as pt had near normal cr of 1.04 in oct 2018 , now ct of 7 to 7.9  DD include;ATN (most likely) ,AIN or RPGN (less likley as pts ua is benign on admission with mild proteinuria)  -pt will plan hd  Monday to optimize renal status prior to renal biopsy on Tues or Wed as per IR schedule  -2. R/O CKD due to Diabetic nephropathy  -pt has small rt.kidney ,normal size Left kidney , r/o ckd secondary to dileep-vascular ds secondary to dm/htn with non nephrotic proteinuria.  -f/u bmp daily  -Keep patient euvolemic and renal diet  Avoid Nephrtoxic Meds/ Agents such as NSAIDs, Gadolinium contrast, Phosphate containing enemas, etc..)  Adjust Medications according to eGFR  3. Anemia possible due to CKD, now stable  -continue  epogen for anemia of ckd and  iv venofer  -f/u Hb daily  4.Htn:bp is acceptble  5.Metabolic acidosis : no improved  -on hd  -f/u co2 daily  -continue  phoslo  6.MBD: secondary to deisy on ckd  -pt s  phos level  is normal   -continue  phoslo   - pt has acceptble pth level-no intervention needed at this time ASSESSMENT AND PLAN:  1. DEISY : Etiology unclear as pt had near normal cr of 1.04 in oct 2018 , now ct of 7 to 7.9  DD include;ATN (most likely) ,AIN or RPGN (less likley as pts ua is benign on admission with mild proteinuria)  -pt will plan hd  tomorrow and Thursday,  to optimize renal status prior to renal biopsy(all risks ,benefits and alternatives to renal biopsy discussed with pt) on Wed  or Friday as per IR schedule  -2. R/O CKD due to Diabetic nephropathy  -pt has small rt.kidney ,normal size Left kidney , r/o ckd secondary to dileep-vascular ds secondary to dm/htn with non nephrotic proteinuria.  -f/u bmp daily  -Keep patient euvolemic and renal diet  Avoid Nephrtoxic Meds/ Agents such as NSAIDs, Gadolinium contrast, Phosphate containing enemas, etc..)  Adjust Medications according to eGFR  3. Anemia possible due to DEISY on CKD , now stable  -continue  epogen for anemia of ckd and  iv venofer  -f/u Hb daily  4.Htn:bp is mildly high  -please titrate bp meds to keep bp<130/80  -add low salt diet  5.Metabolic acidosis : mild ,secondary to ckd  -on hd  -f/u co2 daily  -continue  phoslo  6.MBD: secondary to deisy on ckd  -pt s  phos level  is high normal  -continue  phoslo   - pt has acceptble pth level-no intervention needed at this time

## 2019-01-29 ENCOUNTER — TRANSCRIPTION ENCOUNTER (OUTPATIENT)
Age: 54
End: 2019-01-29

## 2019-01-29 LAB
ANION GAP SERPL CALC-SCNC: 11 MMOL/L — SIGNIFICANT CHANGE UP (ref 5–17)
BASE EXCESS BLDA CALC-SCNC: -6.4 MMOL/L — LOW (ref -2–2)
BASOPHILS # BLD AUTO: 0.1 K/UL — SIGNIFICANT CHANGE UP (ref 0–0.2)
BASOPHILS NFR BLD AUTO: 1.1 % — SIGNIFICANT CHANGE UP (ref 0–2)
BUN SERPL-MCNC: 67 MG/DL — HIGH (ref 7–18)
CALCIUM SERPL-MCNC: 9.1 MG/DL — SIGNIFICANT CHANGE UP (ref 8.4–10.5)
CHLORIDE SERPL-SCNC: 107 MMOL/L — SIGNIFICANT CHANGE UP (ref 96–108)
CO2 SERPL-SCNC: 22 MMOL/L — SIGNIFICANT CHANGE UP (ref 22–31)
CREAT SERPL-MCNC: 8.03 MG/DL — HIGH (ref 0.5–1.3)
EOSINOPHIL # BLD AUTO: 0.3 K/UL — SIGNIFICANT CHANGE UP (ref 0–0.5)
EOSINOPHIL NFR BLD AUTO: 3.5 % — SIGNIFICANT CHANGE UP (ref 0–6)
GLUCOSE BLDC GLUCOMTR-MCNC: 139 MG/DL — HIGH (ref 70–99)
GLUCOSE BLDC GLUCOMTR-MCNC: 173 MG/DL — HIGH (ref 70–99)
GLUCOSE BLDC GLUCOMTR-MCNC: 288 MG/DL — HIGH (ref 70–99)
GLUCOSE BLDC GLUCOMTR-MCNC: 387 MG/DL — HIGH (ref 70–99)
GLUCOSE SERPL-MCNC: 123 MG/DL — HIGH (ref 70–99)
HCO3 BLDA-SCNC: 18 MMOL/L — LOW (ref 23–27)
HCT VFR BLD CALC: 26.9 % — LOW (ref 34.5–45)
HGB BLD-MCNC: 8.5 G/DL — LOW (ref 11.5–15.5)
HOROWITZ INDEX BLDA+IHG-RTO: 21 — SIGNIFICANT CHANGE UP
LYMPHOCYTES # BLD AUTO: 1.8 K/UL — SIGNIFICANT CHANGE UP (ref 1–3.3)
LYMPHOCYTES # BLD AUTO: 24.1 % — SIGNIFICANT CHANGE UP (ref 13–44)
MAGNESIUM SERPL-MCNC: 2.1 MG/DL — SIGNIFICANT CHANGE UP (ref 1.6–2.6)
MCHC RBC-ENTMCNC: 27.1 PG — SIGNIFICANT CHANGE UP (ref 27–34)
MCHC RBC-ENTMCNC: 31.8 GM/DL — LOW (ref 32–36)
MCV RBC AUTO: 85.2 FL — SIGNIFICANT CHANGE UP (ref 80–100)
MONOCYTES # BLD AUTO: 0.9 K/UL — SIGNIFICANT CHANGE UP (ref 0–0.9)
MONOCYTES NFR BLD AUTO: 11.2 % — SIGNIFICANT CHANGE UP (ref 2–14)
NEUTROPHILS # BLD AUTO: 4.6 K/UL — SIGNIFICANT CHANGE UP (ref 1.8–7.4)
NEUTROPHILS NFR BLD AUTO: 60.1 % — SIGNIFICANT CHANGE UP (ref 43–77)
PCO2 BLDA: 35 MMHG — SIGNIFICANT CHANGE UP (ref 32–46)
PH BLDA: 7.34 — LOW (ref 7.35–7.45)
PHOSPHATE SERPL-MCNC: 4.3 MG/DL — SIGNIFICANT CHANGE UP (ref 2.5–4.5)
PLATELET # BLD AUTO: 237 K/UL — SIGNIFICANT CHANGE UP (ref 150–400)
PO2 BLDA: 116 MMHG — HIGH (ref 74–108)
POTASSIUM SERPL-MCNC: 4.6 MMOL/L — SIGNIFICANT CHANGE UP (ref 3.5–5.3)
POTASSIUM SERPL-SCNC: 4.6 MMOL/L — SIGNIFICANT CHANGE UP (ref 3.5–5.3)
RBC # BLD: 3.15 M/UL — LOW (ref 3.8–5.2)
RBC # FLD: 13.5 % — SIGNIFICANT CHANGE UP (ref 10.3–14.5)
SAO2 % BLDA: 98 % — HIGH (ref 92–96)
SODIUM SERPL-SCNC: 140 MMOL/L — SIGNIFICANT CHANGE UP (ref 135–145)
WBC # BLD: 7.6 K/UL — SIGNIFICANT CHANGE UP (ref 3.8–10.5)
WBC # FLD AUTO: 7.6 K/UL — SIGNIFICANT CHANGE UP (ref 3.8–10.5)

## 2019-01-29 RX ORDER — ALTEPLASE 100 MG
4 KIT INTRAVENOUS ONCE
Qty: 0 | Refills: 0 | Status: COMPLETED | OUTPATIENT
Start: 2019-01-29 | End: 2019-01-29

## 2019-01-29 RX ADMIN — Medication 667 MILLIGRAM(S): at 17:42

## 2019-01-29 RX ADMIN — AMLODIPINE BESYLATE 5 MILLIGRAM(S): 2.5 TABLET ORAL at 05:46

## 2019-01-29 RX ADMIN — Medication 5: at 21:49

## 2019-01-29 RX ADMIN — Medication 1 SPRAY(S): at 05:46

## 2019-01-29 RX ADMIN — Medication 0.5 MILLIGRAM(S): at 17:42

## 2019-01-29 RX ADMIN — SIMVASTATIN 20 MILLIGRAM(S): 20 TABLET, FILM COATED ORAL at 21:49

## 2019-01-29 RX ADMIN — ERYTHROPOIETIN 6000 UNIT(S): 10000 INJECTION, SOLUTION INTRAVENOUS; SUBCUTANEOUS at 13:57

## 2019-01-29 RX ADMIN — Medication 25 MICROGRAM(S): at 05:46

## 2019-01-29 RX ADMIN — Medication 667 MILLIGRAM(S): at 11:43

## 2019-01-29 RX ADMIN — ALTEPLASE 4 MILLIGRAM(S): KIT at 13:18

## 2019-01-29 RX ADMIN — Medication 1 SPRAY(S): at 17:42

## 2019-01-29 RX ADMIN — Medication 3: at 17:42

## 2019-01-29 RX ADMIN — HEPARIN SODIUM 5000 UNIT(S): 5000 INJECTION INTRAVENOUS; SUBCUTANEOUS at 05:46

## 2019-01-29 RX ADMIN — Medication 0.5 MILLIGRAM(S): at 05:46

## 2019-01-29 RX ADMIN — Medication 1: at 11:41

## 2019-01-29 RX ADMIN — HEPARIN SODIUM 5000 UNIT(S): 5000 INJECTION INTRAVENOUS; SUBCUTANEOUS at 21:49

## 2019-01-29 NOTE — PROGRESS NOTE ADULT - SUBJECTIVE AND OBJECTIVE BOX
pt seen and examined.pts current chart reviewed and case discussed with resident covering.    SUBJECTIVE:  pt feels fine and denies cp,sob,gi or gu/uremic  symptons    REVIEW OF SYSTEMS:  CONSTITUTIONAL: No weakness, fevers or chills  EYES/ENT: No visual changes;  No vertigo or throat pain   NECK: No pain or stiffness  RESPIRATORY: No cough, wheezing, hemoptysis; No shortness of breath  CARDIOVASCULAR: No chest pain or palpitations  GASTROINTESTINAL: No abdominal or epigastric pain. No nausea, vomiting, or hematemesis; No diarrhea or constipation. No melena or hematochezia.  GENITOURINARY: No dysuria, frequency , hematuria, flank pain or nocturia  NEUROLOGICAL: No numbness or weakness  SKIN: No itching, burning, rashes, or lesions   All other review of systems is negative unless indicated above    Current meds:    amLODIPine   Tablet 5 milliGRAM(s) Oral daily  benztropine 0.5 milliGRAM(s) Oral two times a day  calcium acetate 667 milliGRAM(s) Oral three times a day with meals  dextrose 40% Gel 15 Gram(s) Oral once PRN  dextrose 50% Injectable 12.5 Gram(s) IV Push once  dextrose 50% Injectable 25 Gram(s) IV Push once  dextrose 50% Injectable 25 Gram(s) IV Push once  epoetin seema Injectable 6000 Unit(s) SubCutaneous <User Schedule>  fluticasone propionate 50 MICROgram(s)/spray Nasal Spray 1 Spray(s) Both Nostrils two times a day  glucagon  Injectable 1 milliGRAM(s) IntraMuscular once PRN  heparin  Injectable 5000 Unit(s) SubCutaneous every 8 hours  insulin lispro (HumaLOG) corrective regimen sliding scale   SubCutaneous Before meals and at bedtime  levothyroxine 25 MICROGram(s) Oral daily  simvastatin 20 milliGRAM(s) Oral at bedtime      Vital Signs    T(F): 98.2 (01-29-19 @ 08:51), Max: 98.2 (01-29-19 @ 08:51)  HR: 60 (01-29-19 @ 08:51) (57 - 79)  BP: 108/52 (01-29-19 @ 08:51) (108/52 - 153/71)  ABP: --  RR: 17 (01-29-19 @ 08:51) (16 - 18)  SpO2: 98% (01-29-19 @ 08:51) (98% - 100%)  Wt(kg): --  CVP(cm H2O): --  CO: --  PCWP: --    I and O's:    01-27 @ 07:01  -  01-28 @ 07:00  --------------------------------------------------------  IN:    sodium chloride 0.9%: 100 mL  Total IN: 100 mL    OUT:    Indwelling Catheter - Urethral: 1300 mL  Total OUT: 1300 mL    Total NET: -1200 mL      01-28 @ 07:01 - 01-29 @ 07:00  --------------------------------------------------------  IN:  Total IN: 0 mL    OUT:    Indwelling Catheter - Urethral: 1000 mL    Voided: 100 mL  Total OUT: 1100 mL    Total NET: -1100 mL        Daily     Daily     PHYSICAL EXAM:  Constitutional: well developed, well nourished  and in nad  HEENT: PERRLA,  no icteric sclera and mild pallor of conjunctiva noted  Neck: No JVD, thyromegaly or adenopathy  PC noted in Rt chest  Respiratory: reduced air entry lower lungs with no rales, wheezing or rhonchi  Cardiovascular: S1 and S2 normally heard  Gastrointestinal: soft, nondistended, nontender and normal bowel sounds heard  Extremities: No peripheral edema or cyanosis  Neurological: A/O x 3, no focal deficits  Skin: No rashes      LABS:    CBC:                          8.5    7.6   )-----------( 237      ( 29 Jan 2019 06:19 )             26.9           BMP:    01-29    140  |  107  |  67<H>  ----------------------------<  123<H>  4.6   |  22  |  8.03<H>  01-28    140  |  108  |  55<H>  ----------------------------<  139<H>  4.3   |  21<L>  |  6.99<H>  01-27    141  |  103  |  36<H>  ----------------------------<  171<H>  4.0   |  28  |  4.73<H>    Ca    9.1      29 Jan 2019 06:19  Ca    8.9      28 Jan 2019 07:05  Ca    8.7      27 Jan 2019 05:59  Phos  4.3     01-29  Phos  5.0     01-28  Phos  3.7     01-27  Mg     2.1     01-29  Mg     2.1     01-28  Mg     1.9     01-27    TPro  7.2  /  Alb  3.1<L>  /  TBili  0.2  /  DBili  x   /  AST  27  /  ALT  50  /  AlkPhos  174<H>  01-28          URINE STUDIES:                        RADIOLOGY & ADDITIONAL STUDIES: pt seen and examined.    SUBJECTIVE:  pt feels fine and denies cp,sob,gi or uremic  symptons  pt seen during dialysis.pt is having poor blood flow via PC   we will use cath flow infusion, if cath still not working well pt will need PC change via IR     Current meds:    amLODIPine   Tablet 5 milliGRAM(s) Oral daily  benztropine 0.5 milliGRAM(s) Oral two times a day  calcium acetate 667 milliGRAM(s) Oral three times a day with meals  dextrose 40% Gel 15 Gram(s) Oral once PRN  dextrose 50% Injectable 12.5 Gram(s) IV Push once  dextrose 50% Injectable 25 Gram(s) IV Push once  dextrose 50% Injectable 25 Gram(s) IV Push once  epoetin seema Injectable 6000 Unit(s) SubCutaneous <User Schedule>  fluticasone propionate 50 MICROgram(s)/spray Nasal Spray 1 Spray(s) Both Nostrils two times a day  glucagon  Injectable 1 milliGRAM(s) IntraMuscular once PRN  heparin  Injectable 5000 Unit(s) SubCutaneous every 8 hours  insulin lispro (HumaLOG) corrective regimen sliding scale   SubCutaneous Before meals and at bedtime  levothyroxine 25 MICROGram(s) Oral daily  simvastatin 20 milliGRAM(s) Oral at bedtime      Vital Signs    T(F): 98.2 (01-29-19 @ 08:51), Max: 98.2 (01-29-19 @ 08:51)  HR: 60 (01-29-19 @ 08:51) (57 - 79)  BP: 108/52 (01-29-19 @ 08:51) (108/52 - 153/71)  ABP: --  RR: 17 (01-29-19 @ 08:51) (16 - 18)  SpO2: 98% (01-29-19 @ 08:51) (98% - 100%)  Wt(kg): --  CVP(cm H2O): --  CO: --  PCWP: --    I and O's:    01-27 @ 07:01  -  01-28 @ 07:00  --------------------------------------------------------  IN:    sodium chloride 0.9%: 100 mL  Total IN: 100 mL    OUT:    Indwelling Catheter - Urethral: 1300 mL  Total OUT: 1300 mL    Total NET: -1200 mL      01-28 @ 07:01  -  01-29 @ 07:00  --------------------------------------------------------  IN:  Total IN: 0 mL    OUT:    Indwelling Catheter - Urethral: 1000 mL    Voided: 100 mL  Total OUT: 1100 mL    Total NET: -1100 mL        Daily     Daily     PHYSICAL EXAM:  Constitutional: well developed, well nourished  and in nad  HEENT: PERRLA,  no icteric sclera and mild pallor of conjunctiva noted  Neck: No JVD, thyromegaly or adenopathy  PC noted in Rt chest  Respiratory: reduced air entry lower lungs with no rales, wheezing or rhonchi  Cardiovascular: S1 and S2 normally heard  Gastrointestinal: soft, nondistended, nontender and normal bowel sounds heard  Extremities: No peripheral edema or cyanosis  Neurological: A/O x 3, no focal deficits  Skin: No rashes      LABS:    CBC:                          8.5    7.6   )-----------( 237      ( 29 Jan 2019 06:19 )             26.9           BMP:    01-29    140  |  107  |  67<H>  ----------------------------<  123<H>  4.6   |  22  |  8.03<H>  01-28    140  |  108  |  55<H>  ----------------------------<  139<H>  4.3   |  21<L>  |  6.99<H>  01-27    141  |  103  |  36<H>  ----------------------------<  171<H>  4.0   |  28  |  4.73<H>    Ca    9.1      29 Jan 2019 06:19  Ca    8.9      28 Jan 2019 07:05  Ca    8.7      27 Jan 2019 05:59  Phos  4.3     01-29  Phos  5.0     01-28  Phos  3.7     01-27  Mg     2.1     01-29  Mg     2.1     01-28  Mg     1.9     01-27    TPro  7.2  /  Alb  3.1<L>  /  TBili  0.2  /  DBili  x   /  AST  27  /  ALT  50  /  AlkPhos  174<H>  01-28

## 2019-01-29 NOTE — PROGRESS NOTE ADULT - PROBLEM SELECTOR PLAN 1
- Cr of 7.74 and BUN of 58 on admission ; baseline back in Oct 2018 was Cr of 1.04 and BUN of 19; no improvement over few days, so started on new dialysis via rt permacath   - All work up including ANCA, ASCA, KRYSTAL, Hepatitis panel, HIV, Complement levels, ASO, RF, SPEP negative, Urine bence stahl, UPEP negative   - IR guided Kidney biopsy possibly on wed or Fri   - PPD and hepatitis panel negative , social work consulted to set up for outpatient dialysis   - Monitor BMP  - Avoid nephrotoxic medications   - Nephro follow up - Cr of 7.74 and BUN of 58 on admission ; baseline back in Oct 2018 was Cr of 1.04 and BUN of 19; no improvement over few days, so started on new dialysis via rt permacath   - All work up including ANCA, ASCA, KRYSTAL, Hepatitis panel, HIV, Complement levels, ASO, RF, SPEP negative, Urine bence stahl, UPEP negative   - IR guided Kidney biopsy scheduled for next week as patient is on aspirin which was stopped today. Spoke to sister and attending.   - PPD and hepatitis panel negative , social work consulted to set up for outpatient dialysis   - Monitor BMP  - Avoid nephrotoxic medications   - Nephro follow up

## 2019-01-29 NOTE — DISCHARGE NOTE ADULT - CARE PLAN
Principal Discharge DX:	Acute renal failure  Goal:	resolution and prevent worsening  Assessment and plan of treatment:	You were noted to have acute renal failure with Cr of 7.74 and BUN of 58. Baseline back in Oct 2018 was Cr of 1.04 and BUN of 19. No improvement in Cr noted over few days. All work up including ANCA, ASCA, KRYSTAL, Hepatitis panel, HIV, Complement levels, ASO, RF, SPEP negative, Urine bence stahl, UPEP negative. Started on new dialysis via right sided permacath. PPD and Hepatitis panel negative. Planned for kidney biopsy.  Secondary Diagnosis:	Anemia  Assessment and plan of treatment:	Likely secondary to chronic disease, continue with Epogen. Monitor Hb. Follow up with PCP routinely.  Secondary Diagnosis:	Diabetes mellitus  Assessment and plan of treatment:	Continue with home medications. Follow up with PCP routinely and monitor HbA1c.  Secondary Diagnosis:	Hypothyroid  Assessment and plan of treatment:	Continue with synthroid at home dose. Follow up with PCP routinely, monitor TSH.  Secondary Diagnosis:	CAD (coronary artery disease)  Assessment and plan of treatment:	Continue with statin. Aspirin held for outpatient kidney biopsy.  Secondary Diagnosis:	Schizophrenia  Assessment and plan of treatment:	Continue with monthly injection of Invega. Principal Discharge DX:	Acute renal failure  Goal:	resolution and prevent worsening  Assessment and plan of treatment:	You were noted to have acute renal failure with Cr of 7.74 and BUN of 58. Baseline back in Oct 2018 was Cr of 1.04 and BUN of 19. No improvement in Cr noted over few days. All work up including ANCA, ASCA, KRYSTAL, Hepatitis panel, HIV, Complement levels, ASO, RF, SPEP negative, Urine bence stahl, UPEP negative. Started on new dialysis via right sided permacath. PPD and Hepatitis panel negative. Kidney biopsy done and showed chronic tubulointerstitial disease. Follow up with Vascular for AVF / AVG placement.  Secondary Diagnosis:	Anemia  Assessment and plan of treatment:	Likely secondary to chronic disease, continue with Epogen. Monitor Hb. Follow up with PCP routinely.  Secondary Diagnosis:	Diabetes mellitus  Assessment and plan of treatment:	Home oral medications discontinued given acute renal failure and now on dialysis. You were managed with Insulin during your hospital stay. Continue with Insulin as advised. Follow up with PCP routinely and monitor HbA1c.  Secondary Diagnosis:	Hypothyroid  Assessment and plan of treatment:	Continue with synthroid at home dose. Follow up with PCP routinely, monitor TSH.  Secondary Diagnosis:	CAD (coronary artery disease)  Assessment and plan of treatment:	Continue with home medications as advised.  Secondary Diagnosis:	Schizophrenia  Assessment and plan of treatment:	Continue with monthly injection of Invega. Principal Discharge DX:	Acute renal failure  Goal:	resolution and prevent worsening  Assessment and plan of treatment:	You were noted to have acute renal failure with Cr of 7.74 and BUN of 58. Baseline back in Oct 2018 was Cr of 1.04 and BUN of 19. No improvement in Cr noted over few days. All work up including ANCA, ASCA, KRYSTAL, Hepatitis panel, HIV, Complement levels, ASO, RF, SPEP negative, Urine bence stahl, UPEP negative. Started on new dialysis via right sided permacath. PPD and Hepatitis panel negative. Kidney biopsy done and showed chronic tubulointerstitial disease. Follow up with Vascular for AVF / AVG placement on 3/18/19 @ 9:15 am with Dr. Cárdenas ([phone]) at [place] [address]. You will follow up with Dr. Herzog at HD center.  Secondary Diagnosis:	Anemia  Assessment and plan of treatment:	Likely secondary to chronic disease, continue with Epogen. Monitor Hb. Follow up with PCP routinely.  Secondary Diagnosis:	Diabetes mellitus  Assessment and plan of treatment:	Home oral medications discontinued given acute renal failure and now on dialysis. You were managed with Insulin during your hospital stay. Continue with Insulin as advised. Follow up with PCP routinely and monitor HbA1c in 2 months  Secondary Diagnosis:	Hypothyroid  Assessment and plan of treatment:	Continue with synthroid at home dose. Follow up with PCP routinely, monitor TSH in 3 months  Secondary Diagnosis:	CAD (coronary artery disease)  Assessment and plan of treatment:	Continue with home medications as advised.  Secondary Diagnosis:	Schizophrenia  Assessment and plan of treatment:	Continue with monthly injection of Invega.

## 2019-01-29 NOTE — DISCHARGE NOTE ADULT - PLAN OF CARE
resolution and prevent worsening You were noted to have acute renal failure with Cr of 7.74 and BUN of 58. Baseline back in Oct 2018 was Cr of 1.04 and BUN of 19. No improvement in Cr noted over few days. All work up including ANCA, ASCA, KRYSTAL, Hepatitis panel, HIV, Complement levels, ASO, RF, SPEP negative, Urine bence stahl, UPEP negative. Started on new dialysis via right sided permacath. PPD and Hepatitis panel negative. Planned for kidney biopsy. Likely secondary to chronic disease, continue with Epogen. Monitor Hb. Follow up with PCP routinely. Continue with home medications. Follow up with PCP routinely and monitor HbA1c. Continue with synthroid at home dose. Follow up with PCP routinely, monitor TSH. Continue with statin. Aspirin held for outpatient kidney biopsy. Continue with monthly injection of Invega. You were noted to have acute renal failure with Cr of 7.74 and BUN of 58. Baseline back in Oct 2018 was Cr of 1.04 and BUN of 19. No improvement in Cr noted over few days. All work up including ANCA, ASCA, KRYSTAL, Hepatitis panel, HIV, Complement levels, ASO, RF, SPEP negative, Urine bence stahl, UPEP negative. Started on new dialysis via right sided permacath. PPD and Hepatitis panel negative. Kidney biopsy done and showed chronic tubulointerstitial disease. Follow up with Vascular for AVF / AVG placement. Home oral medications discontinued given acute renal failure and now on dialysis. You were managed with Insulin during your hospital stay. Continue with Insulin as advised. Follow up with PCP routinely and monitor HbA1c. Continue with home medications as advised. You were noted to have acute renal failure with Cr of 7.74 and BUN of 58. Baseline back in Oct 2018 was Cr of 1.04 and BUN of 19. No improvement in Cr noted over few days. All work up including ANCA, ASCA, KRYSTAL, Hepatitis panel, HIV, Complement levels, ASO, RF, SPEP negative, Urine bence stahl, UPEP negative. Started on new dialysis via right sided permacath. PPD and Hepatitis panel negative. Kidney biopsy done and showed chronic tubulointerstitial disease. Follow up with Vascular for AVF / AVG placement on 3/18/19 @ 9:15 am with Dr. Cárdenas ([phone]) at [place] [address]. You will follow up with Dr. Herzog at [facility] center. Home oral medications discontinued given acute renal failure and now on dialysis. You were managed with Insulin during your hospital stay. Continue with Insulin as advised. Follow up with PCP routinely and monitor HbA1c in 2 months Continue with synthroid at home dose. Follow up with PCP routinely, monitor TSH in 3 months

## 2019-01-29 NOTE — DISCHARGE NOTE ADULT - MEDICATION SUMMARY - MEDICATIONS TO TAKE
I will START or STAY ON the medications listed below when I get home from the hospital:    Ecotrin Adult Low Strength 81 mg oral delayed release tablet  -- 1 tab(s) by mouth once a day  -- Indication: For Prophylactic measure    mirtazapine 7.5 mg oral tablet  -- 1 tab(s) by mouth once a day  -- Indication: For Appetite     Lantus 100 units/mL subcutaneous solution  -- 8 unit(s) subcutaneous once a day (at bedtime)   -- Do not drink alcoholic beverages when taking this medication.  It is very important that you take or use this exactly as directed.  Do not skip doses or discontinue unless directed by your doctor.  Keep in refrigerator.  Do not freeze.    -- Indication: For Diabetes    HumaLOG 100 units/mL injectable solution  -- 4 unit(s) injectable 3 times a day   -- Indication: For Diabetes    Zocor 20 mg oral tablet  -- 1 tab(s) by mouth once a day (at bedtime)  -- Indication: For Prophylactic measure    Cogentin  -- Indication: For for Side effect of antiphsycotic     Norvasc 5 mg oral tablet  -- 1 tab(s) by mouth once a day  -- Indication: For Hypertension     docusate sodium 100 mg oral capsule  -- 1 cap(s) by mouth 2 times a day  -- Indication: For Constipation     fluticasone 50 mcg/inh nasal spray  -- 1 spray(s) into nose 2 times a day  -- Indication: For Asthma    calcium acetate 667 mg oral tablet  -- 1 tab(s) by mouth 3 times a day   -- Indication: For Hyperphosphatemia     pantoprazole 40 mg intravenous injection  -- 40 milligram(s) intravenous once a day  -- Indication: For Prophylactic measure    Synthroid 25 mcg (0.025 mg) oral tablet  -- 1 tab(s) by mouth once a day  -- Indication: For Hypothyroid    Nephro-Luis Alfredo oral tablet  -- 1 tab(s) by mouth once a day   -- Indication: For Vitamin

## 2019-01-29 NOTE — DISCHARGE NOTE ADULT - HOSPITAL COURSE
53 female from assisted living with PMH of DM, HLD, Schizophrenia came with complaint of vomiting, difficult to arouse and sweating. Per EMS note and Assisted living papers, patient has been noted to be slightly lethargic since few months now with poor po intake. No history of renal failure. Patient noted to have acute renal failure with Cr of 7.74 and BUN of 58. Admitted for further evaluation and management. Baseline back in Oct 2018 was Cr of 1.04 and BUN of 19; no improvement over few days. All work up including ANCA, ASCA, KRYSTAL, Hepatitis panel, HIV, Complement levels, ASO, RF, SPEP negative, Urine bence stahl, UPEP negative. Started on new dialysis via right sided permacath. PPD and Hepatitis panel negative. Aspirin held and IR guided biopsy was planned. Hb was noted to be low and during the course received Epogen thrice a week, 1 U PRBC and Venofer for 3 days. 53 female from assisted living with PMH of DM, HLD, Schizophrenia came with complaint of vomiting, difficult to arouse and sweating. Per EMS note and Assisted living papers, patient has been noted to be slightly lethargic since few months now with poor po intake. No history of renal failure. Patient noted to have acute renal failure with Cr of 7.74 and BUN of 58. Admitted for further evaluation and management. Baseline back in Oct 2018 was Cr of 1.04 and BUN of 19; no improvement over few days. All work up including ANCA, ASCA, KRYSTAL, Hepatitis panel, HIV, Complement levels, ASO, RF, SPEP negative, Urine bence stahl, UPEP negative. Started on new dialysis via right sided permacath. PPD and Hepatitis panel negative. Hb was noted to be low and during the course received Epogen thrice a week, 1 U PRBC and Venofer for 3 days. Permacath malfunction occurred after 3 sessions of dialysis and patient needed [facility] for dialysis as IR guided permacath exchange was delayed. Patient got permacath exchange and dialysis was continued.  Aspirin held for 5 days and IR guided kidney biopsy was planned. Kidney biopsy showed chronic tubulointerstitial changes. Post kidney biopsy patient had few episodes of vomitus and complained of abdominal pain along with blood in stool and urine. Urgent CT abdomen/pelvis was done and it only showed trace perinephric hematoma on right kidney. Urology was consulted and no intervention was recommended. Vascular surgery consulted for AVG/AVF placement as patient will likely need long term dialysis. Patient recommended to follow up as outpatient with vascular.   Patient medically stable for discharge. Discussed with attending and consultants. 53 female from assisted living with PMH of DM, HLD, Schizophrenia came with complaint of vomiting, difficult to arouse and sweating. Per EMS note and Assisted living papers, patient has been noted to be slightly lethargic since few months now with poor po intake. No history of renal failure. Patient noted to have acute renal failure with Cr of 7.74 and BUN of 58. Admitted for further evaluation and management. Baseline back in Oct 2018 was Cr of 1.04 and BUN of 19; no improvement over few days. All work up including ANCA, ASCA, KRYSTAL, Hepatitis panel, HIV, Complement levels, ASO, RF, SPEP negative, Urine bence stahl, UPEP negative. Started on new dialysis via right sided permacath. PPD and Hepatitis panel negative. Hb was noted to be low and during the course received Epogen thrice a week, 1 U PRBC and Venofer for 3 days. Permacath malfunction occurred after 3 sessions of dialysis and patient needed [facility] for dialysis as IR guided permacath exchange was delayed. Patient got permacath exchange and dialysis was continued.  Aspirin held for 5 days and IR guided kidney biopsy was planned. Kidney biopsy showed chronic tubulointerstitial changes. Post kidney biopsy patient had few episodes of vomitus and complained of abdominal pain along with blood in stool and urine. Urgent CT abdomen/pelvis was done and it only showed trace perinephric hematoma on right kidney. Urology was consulted and no intervention was recommended. Vascular surgery consulted for AVG/AVF.  Patient recommended to follow up as outpatient with vascular with Dr. Cárdenas  Patient medically stable for discharge. Discussed with attending and consultants.

## 2019-01-29 NOTE — DISCHARGE NOTE ADULT - CARE PROVIDER_API CALL
Bruno Herzog)  Internal Medicine; Nephrology  [address] [place] [address]  Phone: [phone]  Fax: [phone]  Follow Up Time:     John Cárdenas)  Vascular Surgery  [address] [facility], [address] [place] [address]  Phone: [phone]  Fax: [phone]  Follow Up Time:

## 2019-01-29 NOTE — DISCHARGE NOTE ADULT - PATIENT PORTAL LINK FT
You can access the [facility] Patient Portal, offered by [facility], by registering with the following website: [email or web address]

## 2019-01-29 NOTE — PROGRESS NOTE ADULT - ASSESSMENT
ASSESSMENT AND PLAN:  1. DEISY : Etiology unclear as pt had near normal cr of 1.04 in oct 2018 , now ct of 7 to 7.9  DD include;ATN (most likely) ,AIN or RPGN (less likley as pts ua is benign on admission with mild proteinuria)  -pt will plan hd  tomorrow and Thursday,  to optimize renal status prior to renal biopsy(all risks ,benefits and alternatives to renal biopsy discussed with pt) on Wed  or Friday as per IR schedule  -2. R/O CKD due to Diabetic nephropathy  -pt has small rt.kidney ,normal size Left kidney , r/o ckd secondary to dileep-vascular ds secondary to dm/htn with non nephrotic proteinuria.  -f/u bmp daily  -Keep patient euvolemic and renal diet  Avoid Nephrtoxic Meds/ Agents such as NSAIDs, Gadolinium contrast, Phosphate containing enemas, etc..)  Adjust Medications according to eGFR  3. 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 DEISY : Etiology unclear as pt had near normal cr of 1.04 in oct 2018 , now ct of 7 to 7.9, now on hd   -pt seen during dialysis.Hd orders writtena and discussed with dialysis RN.  DD include;ATN (most likely) ,AIN or RPGN (less likley as pts ua is benign on admission with mild proteinuria)  -pt is on ASA and need to stop 1 week prior to biopsy  -d/w resident to d/c asa and plan for biopsy next week if ok with pt/pts sister  -2. R/O CKD due to Diabetic nephropathy  -pt has small rt.kidney ,normal size Left kidney , r/o ckd secondary to dileep-vascular ds secondary to dm/htn with non nephrotic proteinuria.  -f/u bmp daily  -Keep patient euvolemic and renal diet  Avoid Nephrtoxic Meds/ Agents such as NSAIDs, Gadolinium contrast, Phosphate containing enemas, etc..)  Adjust Medications according to eGFR  3. 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

## 2019-01-29 NOTE — PROGRESS NOTE ADULT - ASSESSMENT
53 female from assisted living with PMH of DM, HLD, Schizophrenia came with complaint of vomiting, difficult to arouse and sweating. Per EMS note and Assisted living papers , patient  has been noted to be slightly lethargic since few months now with poor po intake. No history of renal failure. Patient noted to have acute renal failure with Cr of 7.74 and BUN of 58. Admitted for further evaluation and management.   Started on new dialysis last Friday via Permacath. PPD and Hepatitis panel negative.   Plan for IR guided kidney biopsy on Wed or Fri as per their schedule. 53 female from assisted living with PMH of DM, HLD, Schizophrenia came with complaint of vomiting, difficult to arouse and sweating. Per EMS note and Assisted living papers , patient  has been noted to be slightly lethargic since few months now with poor po intake. No history of renal failure. Patient noted to have acute renal failure with Cr of 7.74 and BUN of 58. Admitted for further evaluation and management.   Started on new dialysis last Friday via Permacath. PPD and Hepatitis panel negative.   Plan for IR guided kidney biopsy next week, aspirin held today.

## 2019-01-29 NOTE — PROGRESS NOTE ADULT - SUBJECTIVE AND OBJECTIVE BOX
Resident Note discussed with  primary attending    Patient is a 53y old  Female who presents with a chief complaint of vomiting (28 Jan 2019 13:55)      INTERVAL HPI/ OVERNIGHT EVENTS: no new complaints, feeling tired and sleepy     MEDICATIONS  (STANDING):  amLODIPine   Tablet 5 milliGRAM(s) Oral daily  aspirin enteric coated 81 milliGRAM(s) Oral daily  benztropine 0.5 milliGRAM(s) Oral two times a day  calcium acetate 667 milliGRAM(s) Oral three times a day with meals  dextrose 5%. 1000 milliLiter(s) (50 mL/Hr) IV Continuous <Continuous>  dextrose 50% Injectable 12.5 Gram(s) IV Push once  dextrose 50% Injectable 25 Gram(s) IV Push once  dextrose 50% Injectable 25 Gram(s) IV Push once  epoetin seema Injectable 6000 Unit(s) SubCutaneous <User Schedule>  fluticasone propionate 50 MICROgram(s)/spray Nasal Spray 1 Spray(s) Both Nostrils two times a day  heparin  Injectable 5000 Unit(s) SubCutaneous every 8 hours  insulin lispro (HumaLOG) corrective regimen sliding scale   SubCutaneous Before meals and at bedtime  levothyroxine 25 MICROGram(s) Oral daily  simvastatin 20 milliGRAM(s) Oral at bedtime    MEDICATIONS  (PRN):  dextrose 40% Gel 15 Gram(s) Oral once PRN Blood Glucose LESS THAN 70 milliGRAM(s)/deciLiter  glucagon  Injectable 1 milliGRAM(s) IntraMuscular once PRN Glucose <70 milliGRAM(s)/deciLiter      __________________________________________________  REVIEW OF SYSTEMS:    CONSTITUTIONAL: feeling tired and sleepy  EYES: no acute visual disturbances  NECK: No pain or stiffness  RESPIRATORY: No cough; No shortness of breath  CARDIOVASCULAR: No chest pain, no palpitations  GASTROINTESTINAL: No pain. No nausea or vomiting; No diarrhea   NEUROLOGICAL: No headache or numbness, no tremors  MUSCULOSKELETAL: No joint pain, no muscle pain  GENITOURINARY: no dysuria, no frequency, no hesitancy  PSYCHIATRY: no depression , no anxiety  ALL OTHER  ROS negative        Vital Signs Last 24 Hrs  T(C): 36.8 (29 Jan 2019 08:51), Max: 36.8 (29 Jan 2019 08:51)  T(F): 98.2 (29 Jan 2019 08:51), Max: 98.2 (29 Jan 2019 08:51)  HR: 60 (29 Jan 2019 08:51) (57 - 79)  BP: 108/52 (29 Jan 2019 08:51) (108/52 - 153/71)  BP(mean): --  RR: 17 (29 Jan 2019 08:51) (16 - 18)  SpO2: 98% (29 Jan 2019 08:51) (98% - 100%)    ________________________________________________  PHYSICAL EXAM:  GENERAL: NAD  HEENT:Normocephalic;  conjunctivae and sclerae clear; moist mucous membranes;   NECK : supple  CHEST/LUNG: Clear to auscuitation bilaterally with good air entry   HEART: S1 S2  regular; no murmurs, gallops or rubs  ABDOMEN: Soft, Nontender, Nondistended; Bowel sounds present  EXTREMITIES: no cyanosis; no edema; no calf tenderness  NERVOUS SYSTEM:  Awake and alert; Oriented  to place, person and time ; no new deficits    _________________________________________________  LABS:                        8.5    7.6   )-----------( 237      ( 29 Jan 2019 06:19 )             26.9     01-29    140  |  107  |  67<H>  ----------------------------<  123<H>  4.6   |  22  |  8.03<H>    Ca    9.1      29 Jan 2019 06:19  Phos  4.3     01-29  Mg     2.1     01-29    TPro  7.2  /  Alb  3.1<L>  /  TBili  0.2  /  DBili  x   /  AST  27  /  ALT  50  /  AlkPhos  174<H>  01-28        CAPILLARY BLOOD GLUCOSE      POCT Blood Glucose.: 139 mg/dL (29 Jan 2019 08:37)  POCT Blood Glucose.: 230 mg/dL (28 Jan 2019 21:27)  POCT Blood Glucose.: 298 mg/dL (28 Jan 2019 16:41)  POCT Blood Glucose.: 247 mg/dL (28 Jan 2019 12:04)        Consultant(s) Notes Reviewed:   YES    Care Discussed with Consultants : YES    Plan of care was discussed with patient and /or primary care giver; all questions and concerns were addressed and care was aligned with patient's wishes.

## 2019-01-29 NOTE — DISCHARGE NOTE ADULT - MEDICATION SUMMARY - MEDICATIONS TO STOP TAKING
I will STOP taking the medications listed below when I get home from the hospital:    Glucophage    Glucophage 1000 mg oral tablet  -- 1 tab(s) by mouth 2 times a day    glimepiride 2 mg oral tablet  -- 1 tab(s) by mouth once a day

## 2019-01-30 LAB
GLUCOSE BLDC GLUCOMTR-MCNC: 208 MG/DL — HIGH (ref 70–99)
GLUCOSE BLDC GLUCOMTR-MCNC: 220 MG/DL — HIGH (ref 70–99)
GLUCOSE BLDC GLUCOMTR-MCNC: 225 MG/DL — HIGH (ref 70–99)
GLUCOSE BLDC GLUCOMTR-MCNC: 290 MG/DL — HIGH (ref 70–99)
GLUCOSE BLDC GLUCOMTR-MCNC: 354 MG/DL — HIGH (ref 70–99)

## 2019-01-30 RX ORDER — ERYTHROPOIETIN 10000 [IU]/ML
6000 INJECTION, SOLUTION INTRAVENOUS; SUBCUTANEOUS
Qty: 0 | Refills: 0 | Status: DISCONTINUED | OUTPATIENT
Start: 2019-01-30 | End: 2019-02-14

## 2019-01-30 RX ORDER — SODIUM CHLORIDE 9 MG/ML
1000 INJECTION INTRAMUSCULAR; INTRAVENOUS; SUBCUTANEOUS
Qty: 0 | Refills: 0 | Status: DISCONTINUED | OUTPATIENT
Start: 2019-01-30 | End: 2019-01-31

## 2019-01-30 RX ADMIN — SIMVASTATIN 20 MILLIGRAM(S): 20 TABLET, FILM COATED ORAL at 21:44

## 2019-01-30 RX ADMIN — Medication 667 MILLIGRAM(S): at 17:09

## 2019-01-30 RX ADMIN — Medication 5: at 21:45

## 2019-01-30 RX ADMIN — Medication 2: at 17:08

## 2019-01-30 RX ADMIN — Medication 1 SPRAY(S): at 17:09

## 2019-01-30 RX ADMIN — SODIUM CHLORIDE 60 MILLILITER(S): 9 INJECTION INTRAMUSCULAR; INTRAVENOUS; SUBCUTANEOUS at 11:12

## 2019-01-30 RX ADMIN — Medication 25 MICROGRAM(S): at 06:11

## 2019-01-30 RX ADMIN — Medication 2: at 12:07

## 2019-01-30 RX ADMIN — Medication 1 SPRAY(S): at 06:12

## 2019-01-30 RX ADMIN — HEPARIN SODIUM 5000 UNIT(S): 5000 INJECTION INTRAVENOUS; SUBCUTANEOUS at 21:45

## 2019-01-30 RX ADMIN — Medication 0.5 MILLIGRAM(S): at 17:09

## 2019-01-30 RX ADMIN — HEPARIN SODIUM 5000 UNIT(S): 5000 INJECTION INTRAVENOUS; SUBCUTANEOUS at 06:11

## 2019-01-30 RX ADMIN — Medication 0.5 MILLIGRAM(S): at 06:11

## 2019-01-30 RX ADMIN — Medication 2: at 08:46

## 2019-01-30 NOTE — PROGRESS NOTE ADULT - ASSESSMENT
53 female from assisted living with PMH of DM, HLD, Schizophrenia came with complaint of vomiting, difficult to arouse and sweating. Per EMS note and Assisted living papers , patient  has been noted to be slightly lethargic since few months now with poor po intake. No history of renal failure. Patient noted to have acute renal failure with Cr of 7.74 and BUN of 58. Admitted for further evaluation and management.   Started on new dialysis last Friday via Permacath. PPD and Hepatitis panel negative.   Plan for IR guided kidney biopsy next week, aspirin held since 1/29.   Permacath not functioning well, IR requested for exchange.

## 2019-01-30 NOTE — PROGRESS NOTE ADULT - PROBLEM SELECTOR PLAN 1
- Cr of 7.74 and BUN of 58 on admission ; baseline back in Oct 2018 was Cr of 1.04 and BUN of 19; no improvement over few days, so started on new dialysis via rt permacath   - Permcath was not functioning well in dialysis yesterday so IR called today for exchange.   - All work up including ANCA, ASCA, KRYSTAL, Hepatitis panel, HIV, Complement levels, ASO, RF, SPEP negative, Urine bence stahl, UPEP negative   - IR guided Kidney biopsy scheduled for next week, aspirin held since 1/29  - PPD and hepatitis panel negative , social work f/u for setting up dialysis outpatient  - Monitor BMP  - Avoid nephrotoxic medications   - Nephro follow up

## 2019-01-30 NOTE — PROGRESS NOTE ADULT - ASSESSMENT
ASSESSMENT AND PLAN:  1. DEISY : Etiology unclear as pt had near normal cr of 1.04 in oct 2018 , now ct of 7 to 7.9, now on hd   -pt seen during dialysis.Hd orders writtena and discussed with dialysis RN.  DD include;ATN (most likely) ,AIN or RPGN (less likley as pts ua is benign on admission with mild proteinuria)  -pt is on ASA and need to stop 1 week prior to biopsy  -d/w resident to d/c asa and plan for biopsy next week if ok with pt/pts sister  -2. R/O CKD due to Diabetic nephropathy  -pt has small rt.kidney ,normal size Left kidney , r/o ckd secondary to dileep-vascular ds secondary to dm/htn with non nephrotic proteinuria.  -f/u bmp daily  -Keep patient euvolemic and renal diet  Avoid Nephrtoxic Meds/ Agents such as NSAIDs, Gadolinium contrast, Phosphate containing enemas, etc..)  Adjust Medications according to eGFR  3. 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 DEISY : Etiology unclear as pt had near normal cr of 1.04 in oct 2018 , now ct of 7 to 7.9, now on hd   -pt will be dialysed tomorrow  DD include;ATN (most likely) ,AIN or RPGN (less likley as pts ua is benign on admission with mild proteinuria)  -pt is on ASA and need to stop 1 week prior to biopsy  -d/w resident to d/c asa and plan for biopsy next week   -2. R/O CKD due to Diabetic nephropathy  -pt has small rt.kidney ,normal size Left kidney , r/o ckd secondary to dileep-vascular ds secondary to dm/htn with non nephrotic proteinuria.  -f/u bmp daily  -Keep patient euvolemic and renal diet  Avoid Nephrtoxic Meds/ Agents such as NSAIDs, Gadolinium contrast, Phosphate containing enemas, etc..)  Adjust Medications according to eGFR  3. 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

## 2019-01-30 NOTE — PROGRESS NOTE ADULT - SUBJECTIVE AND OBJECTIVE BOX
pt seen and examined.pts current chart reviewed and case discussed with resident covering.    SUBJECTIVE:  pt feels fine and denies cp,sob,gi or gu/uremic  symptons    REVIEW OF SYSTEMS:  CONSTITUTIONAL: No weakness, fevers or chills  EYES/ENT: No visual changes;  No vertigo or throat pain   NECK: No pain or stiffness  RESPIRATORY: No cough, wheezing, hemoptysis; No shortness of breath  CARDIOVASCULAR: No chest pain or palpitations  GASTROINTESTINAL: No abdominal or epigastric pain. No nausea, vomiting, or hematemesis; No diarrhea or constipation. No melena or hematochezia.  GENITOURINARY: No dysuria, frequency , hematuria, flank pain or nocturia  NEUROLOGICAL: No numbness or weakness  SKIN: No itching, burning, rashes, or lesions   All other review of systems is negative unless indicated above    Current meds:    amLODIPine   Tablet 5 milliGRAM(s) Oral daily  benztropine 0.5 milliGRAM(s) Oral two times a day  calcium acetate 667 milliGRAM(s) Oral three times a day with meals  dextrose 40% Gel 15 Gram(s) Oral once PRN  dextrose 50% Injectable 12.5 Gram(s) IV Push once  dextrose 50% Injectable 25 Gram(s) IV Push once  dextrose 50% Injectable 25 Gram(s) IV Push once  epoetin seema Injectable 6000 Unit(s) SubCutaneous <User Schedule>  fluticasone propionate 50 MICROgram(s)/spray Nasal Spray 1 Spray(s) Both Nostrils two times a day  glucagon  Injectable 1 milliGRAM(s) IntraMuscular once PRN  heparin  Injectable 5000 Unit(s) SubCutaneous every 8 hours  insulin lispro (HumaLOG) corrective regimen sliding scale   SubCutaneous Before meals and at bedtime  levothyroxine 25 MICROGram(s) Oral daily  simvastatin 20 milliGRAM(s) Oral at bedtime  sodium chloride 0.9%. 1000 milliLiter(s) IV Continuous <Continuous>      Vital Signs    T(F): 99 (01-30-19 @ 08:08), Max: 99.1 (01-30-19 @ 00:32)  HR: 70 (01-30-19 @ 08:08) (69 - 78)  BP: 106/55 (01-30-19 @ 08:08) (104/53 - 153/83)  ABP: --  RR: 16 (01-30-19 @ 08:08) (16 - 18)  SpO2: 98% (01-30-19 @ 08:08) (96% - 98%)  Wt(kg): --  CVP(cm H2O): --  CO: --  PCWP: --    I and O's:    01-28 @ 07:01  -  01-29 @ 07:00  --------------------------------------------------------  IN:  Total IN: 0 mL    OUT:    Indwelling Catheter - Urethral: 1000 mL    Voided: 100 mL  Total OUT: 1100 mL    Total NET: -1100 mL        Daily     Daily     PHYSICAL EXAM:    Constitutional: well developed, well nourished  and in nad  HEENT: PERRLA,  no icteric sclera and mild pallor of conjunctiva noted  Neck: No JVD, thyromegaly or adenopathy  PC noted in Rt chest  Respiratory: reduced air entry lower lungs with no rales, wheezing or rhonchi  Cardiovascular: S1 and S2 normally heard  Gastrointestinal: soft, nondistended, nontender and normal bowel sounds heard  Extremities: No peripheral edema or cyanosis  Neurological: A/O x 3, no focal deficits  Skin: No rashes      LABS:    CBC:                          8.5    7.6   )-----------( 237      ( 29 Jan 2019 06:19 )             26.9           BMP:    01-29    140  |  107  |  67<H>  ----------------------------<  123<H>  4.6   |  22  |  8.03<H>  01-28    140  |  108  |  55<H>  ----------------------------<  139<H>  4.3   |  21<L>  |  6.99<H>    Ca    9.1      29 Jan 2019 06:19  Ca    8.9      28 Jan 2019 07:05  Phos  4.3     01-29  Phos  5.0     01-28  Mg     2.1     01-29  Mg     2.1     01-28    TPro  7.2  /  Alb  3.1<L>  /  TBili  0.2  /  DBili  x   /  AST  27  /  ALT  50  /  AlkPhos  174<H>  01-28          URINE STUDIES:                        RADIOLOGY & ADDITIONAL STUDIES: pt seen and examined.    SUBJECTIVE:  pt feels fine and denies cp,sob,gi or uremic  symptons      Current meds:    amLODIPine   Tablet 5 milliGRAM(s) Oral daily  benztropine 0.5 milliGRAM(s) Oral two times a day  calcium acetate 667 milliGRAM(s) Oral three times a day with meals  dextrose 40% Gel 15 Gram(s) Oral once PRN  dextrose 50% Injectable 12.5 Gram(s) IV Push once  dextrose 50% Injectable 25 Gram(s) IV Push once  dextrose 50% Injectable 25 Gram(s) IV Push once  epoetin seema Injectable 6000 Unit(s) SubCutaneous <User Schedule>  fluticasone propionate 50 MICROgram(s)/spray Nasal Spray 1 Spray(s) Both Nostrils two times a day  glucagon  Injectable 1 milliGRAM(s) IntraMuscular once PRN  heparin  Injectable 5000 Unit(s) SubCutaneous every 8 hours  insulin lispro (HumaLOG) corrective regimen sliding scale   SubCutaneous Before meals and at bedtime  levothyroxine 25 MICROGram(s) Oral daily  simvastatin 20 milliGRAM(s) Oral at bedtime  sodium chloride 0.9%. 1000 milliLiter(s) IV Continuous <Continuous>      Vital Signs    T(F): 99 (01-30-19 @ 08:08), Max: 99.1 (01-30-19 @ 00:32)  HR: 70 (01-30-19 @ 08:08) (69 - 78)  BP: 106/55 (01-30-19 @ 08:08) (104/53 - 153/83)  ABP: --  RR: 16 (01-30-19 @ 08:08) (16 - 18)  SpO2: 98% (01-30-19 @ 08:08) (96% - 98%)  Wt(kg): --  CVP(cm H2O): --  CO: --  PCWP: --    I and O's:    01-28 @ 07:01  -  01-29 @ 07:00  --------------------------------------------------------  IN:  Total IN: 0 mL    OUT:    Indwelling Catheter - Urethral: 1000 mL    Voided: 100 mL  Total OUT: 1100 mL    Total NET: -1100 mL        Daily     Daily     PHYSICAL EXAM:    Constitutional: well developed, well nourished  and in nad  HEENT: PERRLA,  no icteric sclera and mild pallor of conjunctiva noted  Neck: No JVD, thyromegaly or adenopathy  PC noted in Rt chest  Respiratory: reduced air entry lower lungs with no rales, wheezing or rhonchi  Cardiovascular: S1 and S2 normally heard  Gastrointestinal: soft, nondistended, nontender and normal bowel sounds heard  Extremities: No peripheral edema or cyanosis  Neurological: A/O x 3, no focal deficits  Skin: No rashes      LABS:    CBC:                          8.5    7.6   )-----------( 237      ( 29 Jan 2019 06:19 )             26.9           BMP:    01-29    140  |  107  |  67<H>  ----------------------------<  123<H>  4.6   |  22  |  8.03<H>  01-28    140  |  108  |  55<H>  ----------------------------<  139<H>  4.3   |  21<L>  |  6.99<H>    Ca    9.1      29 Jan 2019 06:19  Ca    8.9      28 Jan 2019 07:05  Phos  4.3     01-29  Phos  5.0     01-28  Mg     2.1     01-29  Mg     2.1     01-28    TPro  7.2  /  Alb  3.1<L>  /  TBili  0.2  /  DBili  x   /  AST  27  /  ALT  50  /  AlkPhos  174<H>  01-28

## 2019-01-30 NOTE — PROGRESS NOTE ADULT - SUBJECTIVE AND OBJECTIVE BOX
Resident Note discussed with  primary attending    Patient is a 53y old  Female who presents with a chief complaint of vomiting. (29 Jan 2019 16:29)      INTERVAL HPI/ OVERNIGHT EVENTS: Permacath not working well, requested IR for exchange.       MEDICATIONS  (STANDING):  amLODIPine   Tablet 5 milliGRAM(s) Oral daily  benztropine 0.5 milliGRAM(s) Oral two times a day  calcium acetate 667 milliGRAM(s) Oral three times a day with meals  dextrose 50% Injectable 12.5 Gram(s) IV Push once  dextrose 50% Injectable 25 Gram(s) IV Push once  dextrose 50% Injectable 25 Gram(s) IV Push once  epoetin seema Injectable 6000 Unit(s) SubCutaneous <User Schedule>  fluticasone propionate 50 MICROgram(s)/spray Nasal Spray 1 Spray(s) Both Nostrils two times a day  heparin  Injectable 5000 Unit(s) SubCutaneous every 8 hours  insulin lispro (HumaLOG) corrective regimen sliding scale   SubCutaneous Before meals and at bedtime  levothyroxine 25 MICROGram(s) Oral daily  simvastatin 20 milliGRAM(s) Oral at bedtime  sodium chloride 0.9%. 1000 milliLiter(s) (60 mL/Hr) IV Continuous <Continuous>    MEDICATIONS  (PRN):  dextrose 40% Gel 15 Gram(s) Oral once PRN Blood Glucose LESS THAN 70 milliGRAM(s)/deciLiter  glucagon  Injectable 1 milliGRAM(s) IntraMuscular once PRN Glucose <70 milliGRAM(s)/deciLiter      __________________________________________________  REVIEW OF SYSTEMS:    CONSTITUTIONAL: No fever   EYES: no acute visual disturbances  NECK: No pain or stiffness  RESPIRATORY: No cough; No shortness of breath  CARDIOVASCULAR: No chest pain, no palpitations  GASTROINTESTINAL: No pain. No nausea or vomiting; No diarrhea   NEUROLOGICAL: No headache or numbness, no tremors  MUSCULOSKELETAL: No joint pain, no muscle pain  GENITOURINARY: no dysuria, no frequency, no hesitancy  PSYCHIATRY: no depression , no anxiety  ALL OTHER  ROS negative        Vital Signs Last 24 Hrs  T(C): 37.2 (30 Jan 2019 08:08), Max: 37.3 (30 Jan 2019 00:32)  T(F): 99 (30 Jan 2019 08:08), Max: 99.1 (30 Jan 2019 00:32)  HR: 70 (30 Jan 2019 08:08) (67 - 78)  BP: 106/55 (30 Jan 2019 08:08) (104/53 - 153/83)  BP(mean): --  RR: 16 (30 Jan 2019 08:08) (16 - 18)  SpO2: 98% (30 Jan 2019 08:08) (96% - 98%)    ________________________________________________  PHYSICAL EXAM:  GENERAL: NAD  HEENT: Normocephalic;  conjunctivae and sclerae clear; moist mucous membranes;   NECK : supple  CHEST/LUNG: Clear to auscultation bilaterally with good air entry   HEART: S1 S2  regular; no murmurs, gallops or rubs  ABDOMEN: Soft, Nontender, Nondistended; Bowel sounds present  EXTREMITIES: no cyanosis; no edema; no calf tenderness  NERVOUS SYSTEM:  Awake and alert; Oriented  to place, person and time ; no new deficits    _________________________________________________  LABS:                        8.5    7.6   )-----------( 237      ( 29 Jan 2019 06:19 )             26.9     01-29    140  |  107  |  67<H>  ----------------------------<  123<H>  4.6   |  22  |  8.03<H>    Ca    9.1      29 Jan 2019 06:19  Phos  4.3     01-29  Mg     2.1     01-29          CAPILLARY BLOOD GLUCOSE      POCT Blood Glucose.: 220 mg/dL (30 Jan 2019 08:28)  POCT Blood Glucose.: 290 mg/dL (30 Jan 2019 00:16)  POCT Blood Glucose.: 387 mg/dL (29 Jan 2019 21:37)  POCT Blood Glucose.: 288 mg/dL (29 Jan 2019 16:52)  POCT Blood Glucose.: 173 mg/dL (29 Jan 2019 11:27)        Consultant(s) Notes Reviewed:   YES    Care Discussed with Consultants : YES    Plan of care was discussed with patient and /or primary care giver; all questions and concerns were addressed and care was aligned with patient's wishes.

## 2019-01-31 LAB
ALBUMIN SERPL ELPH-MCNC: 3.8 G/DL — SIGNIFICANT CHANGE UP (ref 3.5–5)
ALP SERPL-CCNC: 196 U/L — HIGH (ref 40–120)
ALT FLD-CCNC: 44 U/L DA — SIGNIFICANT CHANGE UP (ref 10–60)
ANION GAP SERPL CALC-SCNC: 13 MMOL/L — SIGNIFICANT CHANGE UP (ref 5–17)
AST SERPL-CCNC: 11 U/L — SIGNIFICANT CHANGE UP (ref 10–40)
BASOPHILS # BLD AUTO: 0.1 K/UL — SIGNIFICANT CHANGE UP (ref 0–0.2)
BASOPHILS NFR BLD AUTO: 1.4 % — SIGNIFICANT CHANGE UP (ref 0–2)
BILIRUB SERPL-MCNC: 0.4 MG/DL — SIGNIFICANT CHANGE UP (ref 0.2–1.2)
BUN SERPL-MCNC: 81 MG/DL — HIGH (ref 7–18)
CALCIUM SERPL-MCNC: 9.5 MG/DL — SIGNIFICANT CHANGE UP (ref 8.4–10.5)
CHLORIDE SERPL-SCNC: 100 MMOL/L — SIGNIFICANT CHANGE UP (ref 96–108)
CO2 SERPL-SCNC: 20 MMOL/L — LOW (ref 22–31)
CREAT SERPL-MCNC: 8.56 MG/DL — HIGH (ref 0.5–1.3)
EOSINOPHIL # BLD AUTO: 0.3 K/UL — SIGNIFICANT CHANGE UP (ref 0–0.5)
EOSINOPHIL NFR BLD AUTO: 3.3 % — SIGNIFICANT CHANGE UP (ref 0–6)
GLUCOSE BLDC GLUCOMTR-MCNC: 218 MG/DL — HIGH (ref 70–99)
GLUCOSE BLDC GLUCOMTR-MCNC: 289 MG/DL — HIGH (ref 70–99)
GLUCOSE BLDC GLUCOMTR-MCNC: 325 MG/DL — HIGH (ref 70–99)
GLUCOSE BLDC GLUCOMTR-MCNC: 362 MG/DL — HIGH (ref 70–99)
GLUCOSE SERPL-MCNC: 202 MG/DL — HIGH (ref 70–99)
HCT VFR BLD CALC: 31.6 % — LOW (ref 34.5–45)
HGB BLD-MCNC: 9.9 G/DL — LOW (ref 11.5–15.5)
LYMPHOCYTES # BLD AUTO: 1.8 K/UL — SIGNIFICANT CHANGE UP (ref 1–3.3)
LYMPHOCYTES # BLD AUTO: 21.6 % — SIGNIFICANT CHANGE UP (ref 13–44)
MAGNESIUM SERPL-MCNC: 2.3 MG/DL — SIGNIFICANT CHANGE UP (ref 1.6–2.6)
MCHC RBC-ENTMCNC: 26.8 PG — LOW (ref 27–34)
MCHC RBC-ENTMCNC: 31.4 GM/DL — LOW (ref 32–36)
MCV RBC AUTO: 85.4 FL — SIGNIFICANT CHANGE UP (ref 80–100)
MONOCYTES # BLD AUTO: 0.9 K/UL — SIGNIFICANT CHANGE UP (ref 0–0.9)
MONOCYTES NFR BLD AUTO: 10.8 % — SIGNIFICANT CHANGE UP (ref 2–14)
NEUTROPHILS # BLD AUTO: 5.2 K/UL — SIGNIFICANT CHANGE UP (ref 1.8–7.4)
NEUTROPHILS NFR BLD AUTO: 62.9 % — SIGNIFICANT CHANGE UP (ref 43–77)
PHOSPHATE SERPL-MCNC: 5.4 MG/DL — HIGH (ref 2.5–4.5)
PLATELET # BLD AUTO: 218 K/UL — SIGNIFICANT CHANGE UP (ref 150–400)
POTASSIUM SERPL-MCNC: 4 MMOL/L — SIGNIFICANT CHANGE UP (ref 3.5–5.3)
POTASSIUM SERPL-SCNC: 4 MMOL/L — SIGNIFICANT CHANGE UP (ref 3.5–5.3)
PROT SERPL-MCNC: 8.2 G/DL — SIGNIFICANT CHANGE UP (ref 6–8.3)
RBC # BLD: 3.71 M/UL — LOW (ref 3.8–5.2)
RBC # FLD: 13 % — SIGNIFICANT CHANGE UP (ref 10.3–14.5)
SODIUM SERPL-SCNC: 133 MMOL/L — LOW (ref 135–145)
WBC # BLD: 8.2 K/UL — SIGNIFICANT CHANGE UP (ref 3.8–10.5)
WBC # FLD AUTO: 8.2 K/UL — SIGNIFICANT CHANGE UP (ref 3.8–10.5)

## 2019-01-31 RX ORDER — INSULIN GLARGINE 100 [IU]/ML
5 INJECTION, SOLUTION SUBCUTANEOUS AT BEDTIME
Qty: 0 | Refills: 0 | Status: DISCONTINUED | OUTPATIENT
Start: 2019-01-31 | End: 2019-02-08

## 2019-01-31 RX ORDER — INSULIN LISPRO 100/ML
2 VIAL (ML) SUBCUTANEOUS
Qty: 0 | Refills: 0 | Status: DISCONTINUED | OUTPATIENT
Start: 2019-01-31 | End: 2019-02-08

## 2019-01-31 RX ADMIN — Medication 1 SPRAY(S): at 06:06

## 2019-01-31 RX ADMIN — Medication 3: at 16:57

## 2019-01-31 RX ADMIN — SIMVASTATIN 20 MILLIGRAM(S): 20 TABLET, FILM COATED ORAL at 21:53

## 2019-01-31 RX ADMIN — HEPARIN SODIUM 5000 UNIT(S): 5000 INJECTION INTRAVENOUS; SUBCUTANEOUS at 06:05

## 2019-01-31 RX ADMIN — HEPARIN SODIUM 5000 UNIT(S): 5000 INJECTION INTRAVENOUS; SUBCUTANEOUS at 21:52

## 2019-01-31 RX ADMIN — Medication 667 MILLIGRAM(S): at 12:35

## 2019-01-31 RX ADMIN — Medication 4: at 21:51

## 2019-01-31 RX ADMIN — INSULIN GLARGINE 5 UNIT(S): 100 INJECTION, SOLUTION SUBCUTANEOUS at 21:53

## 2019-01-31 RX ADMIN — Medication 667 MILLIGRAM(S): at 18:23

## 2019-01-31 RX ADMIN — Medication 0.5 MILLIGRAM(S): at 18:23

## 2019-01-31 RX ADMIN — Medication 5: at 12:34

## 2019-01-31 RX ADMIN — Medication 2 UNIT(S): at 16:57

## 2019-01-31 RX ADMIN — Medication 2 UNIT(S): at 12:34

## 2019-01-31 RX ADMIN — Medication 0.5 MILLIGRAM(S): at 06:05

## 2019-01-31 RX ADMIN — Medication 667 MILLIGRAM(S): at 09:03

## 2019-01-31 RX ADMIN — Medication 25 MICROGRAM(S): at 06:05

## 2019-01-31 RX ADMIN — Medication 1 SPRAY(S): at 18:24

## 2019-01-31 RX ADMIN — HEPARIN SODIUM 5000 UNIT(S): 5000 INJECTION INTRAVENOUS; SUBCUTANEOUS at 14:24

## 2019-01-31 RX ADMIN — Medication 2: at 09:01

## 2019-01-31 RX ADMIN — AMLODIPINE BESYLATE 5 MILLIGRAM(S): 2.5 TABLET ORAL at 06:05

## 2019-01-31 NOTE — PROGRESS NOTE ADULT - ASSESSMENT
53 female from assisted living with PMH of DM, HLD, Schizophrenia came with complaint of vomiting, difficult to arouse and sweating. Per EMS note and Assisted living papers , patient  has been noted to be slightly lethargic since few months now with poor po intake. No history of renal failure. Patient noted to have acute renal failure with Cr of 7.74 and BUN of 58. Admitted for further evaluation and management.   Started on new dialysis last Friday via Permacath. PPD and Hepatitis panel negative.   Plan for IR guided kidney biopsy next week, aspirin held since 1/29.   Permacath not functioning well, IR requested for exchange yesterday but could not do because of the tight schedule, will dialyze again today and if still not working well, would request IR tomorrow for exchange, as there is no one in IR today. 53 female from assisted living with PMH of DM, HLD, Schizophrenia came with complaint of vomiting, difficult to arouse and sweating. Per EMS note and Assisted living papers , patient  has been noted to be slightly lethargic since few months now with poor po intake. No history of renal failure. Patient noted to have acute renal failure with Cr of 7.74 and BUN of 58. Admitted for further evaluation and management.   Started on new dialysis last Friday via Permacath. PPD and Hepatitis panel negative.   Plan for IR guided kidney biopsy next week, aspirin held since 1/29.   Permacath not functioning well, IR was requested for exchange yesterday but could not do because of the tight schedule, nobody available in IR today, would request IR tomorrow for exchange.

## 2019-01-31 NOTE — PROGRESS NOTE ADULT - ASSESSMENT
ASSESSMENT AND PLAN:  1. DEISY : Etiology unclear as pt had near normal cr of 1.04 in oct 2018 , now ct of 7 to 7.9, now on hd   -pt will be dialysed tomorrow  DD include;ATN (most likely) ,AIN or RPGN (less likley as pts ua is benign on admission with mild proteinuria)  -pt is on ASA and need to stop 1 week prior to biopsy  -d/w resident to d/c asa and plan for biopsy next week   -2. R/O CKD due to Diabetic nephropathy  -pt has small rt.kidney ,normal size Left kidney , r/o ckd secondary to dileep-vascular ds secondary to dm/htn with non nephrotic proteinuria.  -f/u bmp daily  -Keep patient euvolemic and renal diet  Avoid Nephrtoxic Meds/ Agents such as NSAIDs, Gadolinium contrast, Phosphate containing enemas, etc..)  Adjust Medications according to eGFR  3. Anemia possible due to DEISY on CKD , now stable  -continue  epogen for anemia of ckd and  iv venofer  -f/u Hb daily  4.Htn:bp is controlled  -please titrate bp meds to keep bp<130/80  - low salt diet  5.Metabolic acidosis : mild ,secondary to ckd  -on hd  -f/u co2 daily  -continue  phoslo  6.MBD: secondary to deisy on ckd  -pt s  phos level  is high normal  -continue  phoslo   - pt has acceptble pth level-no intervention needed at this time  7.PC MAL-FUNCTION: use cath flow prn  -may need new pc by IR if cath still not working well tomorrow ASSESSMENT AND PLAN:  1. DEISY : Etiology unclear as pt had near normal cr of 1.04 in oct 2018 , now ct of 7 to 7.9, now on hd   -pt will be dialysed tomorrow pending PC revision by IR   DD include;ATN (most likely) ,AIN or RPGN (less likley as pts ua is benign on admission with mild proteinuria)  - we will  plan for biopsy next week   -2. R/O CKD due to Diabetic nephropathy  -pt has small rt.kidney ,normal size Left kidney , r/o ckd secondary to dileep-vascular ds secondary to dm/htn with non nephrotic proteinuria.  -f/u bmp daily  -Keep patient euvolemic and renal diet  Avoid Nephrtoxic Meds/ Agents such as NSAIDs, Gadolinium contrast, Phosphate containing enemas, etc..)  Adjust Medications according to eGFR  3. Anemia possible due to DEISY on CKD , now stable  -continue  epogen for anemia of ckd   -f/u Hb daily  4.Htn:bp is controlled  -please  keep bp<130/80  - low salt diet  5.Metabolic acidosis : mild ,secondary to ckd  -on hd  -f/u co2 daily  -continue  phoslo  6.MBD: secondary to deisy on ckd  -pt s  phos level  is high  -continue  phoslo   - pt has acceptble pth level-no intervention needed at this time  7.PC MAL-FUNCTION: s/p  cath flow but pc stii not working well as per dialysis RN  -pt needs  new pc by IR in am

## 2019-01-31 NOTE — PROGRESS NOTE ADULT - SUBJECTIVE AND OBJECTIVE BOX
pt seen and examined.pts current chart reviewed and case discussed with resident covering.    SUBJECTIVE:  pt feels fine and denies cp,sob,gi or gu/uremic  symptons    REVIEW OF SYSTEMS:  CONSTITUTIONAL: No weakness, fevers or chills  EYES/ENT: No visual changes;  No vertigo or throat pain   NECK: No pain or stiffness  RESPIRATORY: No cough, wheezing, hemoptysis; No shortness of breath  CARDIOVASCULAR: No chest pain or palpitations  GASTROINTESTINAL: No abdominal or epigastric pain. No nausea, vomiting, or hematemesis; No diarrhea or constipation. No melena or hematochezia.  GENITOURINARY: No dysuria, frequency , hematuria, flank pain or nocturia  NEUROLOGICAL: No numbness or weakness  SKIN: No itching, burning, rashes, or lesions   All other review of systems is negative unless indicated above    Current meds:    amLODIPine   Tablet 5 milliGRAM(s) Oral daily  benztropine 0.5 milliGRAM(s) Oral two times a day  calcium acetate 667 milliGRAM(s) Oral three times a day with meals  dextrose 40% Gel 15 Gram(s) Oral once PRN  dextrose 50% Injectable 12.5 Gram(s) IV Push once  dextrose 50% Injectable 25 Gram(s) IV Push once  dextrose 50% Injectable 25 Gram(s) IV Push once  epoetin seema Injectable 6000 Unit(s) SubCutaneous <User Schedule>  fluticasone propionate 50 MICROgram(s)/spray Nasal Spray 1 Spray(s) Both Nostrils two times a day  glucagon  Injectable 1 milliGRAM(s) IntraMuscular once PRN  heparin  Injectable 5000 Unit(s) SubCutaneous every 8 hours  insulin glargine Injectable (LANTUS) 5 Unit(s) SubCutaneous at bedtime  insulin lispro (HumaLOG) corrective regimen sliding scale   SubCutaneous Before meals and at bedtime  insulin lispro Injectable (HumaLOG) 2 Unit(s) SubCutaneous three times a day before meals  levothyroxine 25 MICROGram(s) Oral daily  simvastatin 20 milliGRAM(s) Oral at bedtime      Vital Signs    T(F): 97.4 (01-31-19 @ 07:50), Max: 98.7 (01-30-19 @ 23:40)  HR: 64 (01-31-19 @ 07:50) (62 - 81)  BP: 134/61 (01-31-19 @ 07:50) (124/61 - 143/71)  ABP: --  RR: 16 (01-31-19 @ 07:50) (16 - 17)  SpO2: 100% (01-31-19 @ 07:50) (98% - 100%)  Wt(kg): --  CVP(cm H2O): --  CO: --  PCWP: --    I and O's:    Daily     Daily     PHYSICAL EXAM:  Constitutional: well developed, well nourished  and in nad  HEENT: PERRLA,  no icteric sclera and mild pallor of conjunctiva noted  Neck: No JVD, thyromegaly or adenopathy  PC noted in Rt chest  Respiratory: reduced air entry lower lungs with no rales, wheezing or rhonchi  Cardiovascular: S1 and S2 normally heard  Gastrointestinal: soft, nondistended, nontender and normal bowel sounds heard  Extremities: No peripheral edema or cyanosis  Neurological: A/O x 3, no focal deficits  Skin: No rashes      LABS:    CBC:                          9.9    8.2   )-----------( 218      ( 31 Jan 2019 08:28 )             31.6           BMP:    01-31    133<L>  |  100  |  81<H>  ----------------------------<  202<H>  4.0   |  20<L>  |  8.56<H>  01-29    140  |  107  |  67<H>  ----------------------------<  123<H>  4.6   |  22  |  8.03<H>    Ca    9.5      31 Jan 2019 08:28  Ca    9.1      29 Jan 2019 06:19  Phos  5.4     01-31  Phos  4.3     01-29  Mg     2.3     01-31  Mg     2.1     01-29    TPro  8.2  /  Alb  3.8  /  TBili  0.4  /  DBili  x   /  AST  11  /  ALT  44  /  AlkPhos  196<H>  01-31          URINE STUDIES:                        RADIOLOGY & ADDITIONAL STUDIES: pt seen and examined.    SUBJECTIVE:  pt denies cp,sob,gi or uremic  symptons  pt is awaiting for PC revision as per IR tomorrow and then will need hd     Current meds:    amLODIPine   Tablet 5 milliGRAM(s) Oral daily  benztropine 0.5 milliGRAM(s) Oral two times a day  calcium acetate 667 milliGRAM(s) Oral three times a day with meals  dextrose 40% Gel 15 Gram(s) Oral once PRN  dextrose 50% Injectable 12.5 Gram(s) IV Push once  dextrose 50% Injectable 25 Gram(s) IV Push once  dextrose 50% Injectable 25 Gram(s) IV Push once  epoetin seema Injectable 6000 Unit(s) SubCutaneous <User Schedule>  fluticasone propionate 50 MICROgram(s)/spray Nasal Spray 1 Spray(s) Both Nostrils two times a day  glucagon  Injectable 1 milliGRAM(s) IntraMuscular once PRN  heparin  Injectable 5000 Unit(s) SubCutaneous every 8 hours  insulin glargine Injectable (LANTUS) 5 Unit(s) SubCutaneous at bedtime  insulin lispro (HumaLOG) corrective regimen sliding scale   SubCutaneous Before meals and at bedtime  insulin lispro Injectable (HumaLOG) 2 Unit(s) SubCutaneous three times a day before meals  levothyroxine 25 MICROGram(s) Oral daily  simvastatin 20 milliGRAM(s) Oral at bedtime      Vital Signs    T(F): 97.4 (01-31-19 @ 07:50), Max: 98.7 (01-30-19 @ 23:40)  HR: 64 (01-31-19 @ 07:50) (62 - 81)  BP: 134/61 (01-31-19 @ 07:50) (124/61 - 143/71)  ABP: --  RR: 16 (01-31-19 @ 07:50) (16 - 17)  SpO2: 100% (01-31-19 @ 07:50) (98% - 100%)  Wt(kg): --  CVP(cm H2O): --  CO: --  PCWP: --    I and O's:    Daily     Daily     PHYSICAL EXAM:  Constitutional: well developed, well nourished  and in nad  HEENT: PERRLA,  no icteric sclera and mild pallor of conjunctiva noted  Neck: No JVD, thyromegaly or adenopathy  PC noted in Rt chest  Respiratory: reduced air entry lower lungs with no rales, wheezing or rhonchi  Cardiovascular: S1 and S2 normally heard  Gastrointestinal: soft, nondistended, nontender and normal bowel sounds heard  Extremities: No peripheral edema or cyanosis  Neurological: A/O x 3, no focal deficits  Skin: No rashes      LABS:    CBC:                          9.9    8.2   )-----------( 218      ( 31 Jan 2019 08:28 )             31.6           BMP:    01-31    133<L>  |  100  |  81<H>  ----------------------------<  202<H>  4.0   |  20<L>  |  8.56<H>  01-29    140  |  107  |  67<H>  ----------------------------<  123<H>  4.6   |  22  |  8.03<H>    Ca    9.5      31 Jan 2019 08:28  Ca    9.1      29 Jan 2019 06:19  Phos  5.4     01-31  Phos  4.3     01-29  Mg     2.3     01-31  Mg     2.1     01-29    TPro  8.2  /  Alb  3.8  /  TBili  0.4  /  DBili  x   /  AST  11  /  ALT  44  /  AlkPhos  196<H>  01-31

## 2019-01-31 NOTE — PROGRESS NOTE ADULT - PROBLEM SELECTOR PLAN 1
- Cr of 7.74 and BUN of 58 on admission ; baseline back in Oct 2018 was Cr of 1.04 and BUN of 19; no improvement was noted over few days, so started on new dialysis on 1/25 via rt permacath   - Permcath malfunction on 1/29, will try to dialyze again today, if still not working, would request IR for exchange tomorrow.   - IR guided Kidney biopsy scheduled for next week, aspirin held since 1/29  - PPD and hepatitis panel negative , social work f/u for setting up dialysis outpatient  - Monitor BMP  - Avoid nephrotoxic medications   - Nephro follow up - Cr of 7.74 and BUN of 58 on admission ; baseline back in Oct 2018 was Cr of 1.04 and BUN of 19; no improvement was noted over few days, so started on new dialysis on 1/25 via rt permacath   - Permcath malfunction on 1/29, IR was requested for exchange yesterday but could not do because of the tight schedule, nobody available in IR today, would request IR tomorrow for exchange.   - IR guided Kidney biopsy scheduled for next week, aspirin held since 1/29  - PPD and hepatitis panel negative , social work f/u for setting up dialysis outpatient  - Monitor BMP  - Avoid nephrotoxic medications   - Nephro follow up

## 2019-01-31 NOTE — PROGRESS NOTE ADULT - SUBJECTIVE AND OBJECTIVE BOX
Resident Note discussed with  primary attending    Patient is a 53y old  Female who presents with a chief complaint of vomiting (30 Jan 2019 15:00)      INTERVAL HPI/OVERNIGHT EVENTS: no new complaints    MEDICATIONS  (STANDING):  amLODIPine   Tablet 5 milliGRAM(s) Oral daily  benztropine 0.5 milliGRAM(s) Oral two times a day  calcium acetate 667 milliGRAM(s) Oral three times a day with meals  dextrose 50% Injectable 12.5 Gram(s) IV Push once  dextrose 50% Injectable 25 Gram(s) IV Push once  dextrose 50% Injectable 25 Gram(s) IV Push once  epoetin seema Injectable 6000 Unit(s) SubCutaneous <User Schedule>  fluticasone propionate 50 MICROgram(s)/spray Nasal Spray 1 Spray(s) Both Nostrils two times a day  heparin  Injectable 5000 Unit(s) SubCutaneous every 8 hours  insulin lispro (HumaLOG) corrective regimen sliding scale   SubCutaneous Before meals and at bedtime  levothyroxine 25 MICROGram(s) Oral daily  simvastatin 20 milliGRAM(s) Oral at bedtime    MEDICATIONS  (PRN):  dextrose 40% Gel 15 Gram(s) Oral once PRN Blood Glucose LESS THAN 70 milliGRAM(s)/deciLiter  glucagon  Injectable 1 milliGRAM(s) IntraMuscular once PRN Glucose <70 milliGRAM(s)/deciLiter      __________________________________________________  REVIEW OF SYSTEMS:    CONSTITUTIONAL: No fever,   EYES: no acute visual disturbances  NECK: No pain or stiffness  RESPIRATORY: No cough; No shortness of breath  CARDIOVASCULAR: No chest pain, no palpitations  GASTROINTESTINAL: No pain. No nausea or vomiting; No diarrhea   NEUROLOGICAL: No headache or numbness, no tremors  MUSCULOSKELETAL: No joint pain, no muscle pain  GENITOURINARY: no dysuria, no frequency, no hesitancy  PSYCHIATRY: no depression , no anxiety  ALL OTHER  ROS negative        Vital Signs Last 24 Hrs  T(C): 36.3 (31 Jan 2019 07:50), Max: 37.1 (30 Jan 2019 23:40)  T(F): 97.4 (31 Jan 2019 07:50), Max: 98.7 (30 Jan 2019 23:40)  HR: 64 (31 Jan 2019 07:50) (62 - 81)  BP: 134/61 (31 Jan 2019 07:50) (124/61 - 143/71)  BP(mean): --  RR: 16 (31 Jan 2019 07:50) (16 - 17)  SpO2: 100% (31 Jan 2019 07:50) (98% - 100%)    ________________________________________________  PHYSICAL EXAM:  GENERAL: NAD  HEENT:Normocephalic;  conjunctivae and sclerae clear; moist mucous membranes;   NECK : supple  CHEST/LUNG: Clear to auscuitation bilaterally with good air entry   HEART: S1 S2  regular; no murmurs, gallops or rubs  ABDOMEN: Soft, Nontender, Nondistended; Bowel sounds present  EXTREMITIES: no cyanosis; no edema; no calf tenderness  NERVOUS SYSTEM:  Awake and alert; Oriented  to place, person and time ; no new deficits    _________________________________________________  LABS:                        9.9    8.2   )-----------( 218      ( 31 Jan 2019 08:28 )             31.6     01-31    133<L>  |  100  |  81<H>  ----------------------------<  202<H>  4.0   |  20<L>  |  8.56<H>    Ca    9.5      31 Jan 2019 08:28  Phos  5.4     01-31  Mg     2.3     01-31    TPro  8.2  /  Alb  3.8  /  TBili  0.4  /  DBili  x   /  AST  11  /  ALT  44  /  AlkPhos  196<H>  01-31        CAPILLARY BLOOD GLUCOSE      POCT Blood Glucose.: 218 mg/dL (31 Jan 2019 08:19)  POCT Blood Glucose.: 354 mg/dL (30 Jan 2019 21:20)  POCT Blood Glucose.: 225 mg/dL (30 Jan 2019 16:49)  POCT Blood Glucose.: 208 mg/dL (30 Jan 2019 11:55)        Consultant(s) Notes Reviewed:   YES    Care Discussed with Consultants : YES    Plan of care was discussed with patient and /or primary care giver; all questions and concerns were addressed and care was aligned with patient's wishes.

## 2019-02-01 LAB
CK MB BLD-MCNC: <2 % — SIGNIFICANT CHANGE UP (ref 0–3.5)
CK MB CFR SERPL CALC: <1 NG/ML — SIGNIFICANT CHANGE UP (ref 0–3.6)
CK SERPL-CCNC: 51 U/L — SIGNIFICANT CHANGE UP (ref 21–215)
GLUCOSE BLDC GLUCOMTR-MCNC: 143 MG/DL — HIGH (ref 70–99)
GLUCOSE BLDC GLUCOMTR-MCNC: 189 MG/DL — HIGH (ref 70–99)
GLUCOSE BLDC GLUCOMTR-MCNC: 210 MG/DL — HIGH (ref 70–99)
GLUCOSE BLDC GLUCOMTR-MCNC: 231 MG/DL — HIGH (ref 70–99)
TROPONIN I SERPL-MCNC: <0.015 NG/ML — SIGNIFICANT CHANGE UP (ref 0–0.04)

## 2019-02-01 PROCEDURE — 93010 ELECTROCARDIOGRAM REPORT: CPT

## 2019-02-01 RX ORDER — SENNA PLUS 8.6 MG/1
2 TABLET ORAL AT BEDTIME
Qty: 0 | Refills: 0 | Status: DISCONTINUED | OUTPATIENT
Start: 2019-02-01 | End: 2019-02-14

## 2019-02-01 RX ORDER — DOCUSATE SODIUM 100 MG
100 CAPSULE ORAL
Qty: 0 | Refills: 0 | Status: DISCONTINUED | OUTPATIENT
Start: 2019-02-01 | End: 2019-02-14

## 2019-02-01 RX ADMIN — Medication 25 MICROGRAM(S): at 06:01

## 2019-02-01 RX ADMIN — INSULIN GLARGINE 5 UNIT(S): 100 INJECTION, SOLUTION SUBCUTANEOUS at 21:53

## 2019-02-01 RX ADMIN — Medication 667 MILLIGRAM(S): at 17:18

## 2019-02-01 RX ADMIN — AMLODIPINE BESYLATE 5 MILLIGRAM(S): 2.5 TABLET ORAL at 06:01

## 2019-02-01 RX ADMIN — HEPARIN SODIUM 5000 UNIT(S): 5000 INJECTION INTRAVENOUS; SUBCUTANEOUS at 21:53

## 2019-02-01 RX ADMIN — Medication 2: at 08:34

## 2019-02-01 RX ADMIN — Medication 1 SPRAY(S): at 17:18

## 2019-02-01 RX ADMIN — SENNA PLUS 2 TABLET(S): 8.6 TABLET ORAL at 23:04

## 2019-02-01 RX ADMIN — Medication 0.5 MILLIGRAM(S): at 17:17

## 2019-02-01 RX ADMIN — HEPARIN SODIUM 5000 UNIT(S): 5000 INJECTION INTRAVENOUS; SUBCUTANEOUS at 06:01

## 2019-02-01 RX ADMIN — Medication 2 UNIT(S): at 17:18

## 2019-02-01 RX ADMIN — Medication 0.5 MILLIGRAM(S): at 06:01

## 2019-02-01 RX ADMIN — Medication 2 UNIT(S): at 08:33

## 2019-02-01 RX ADMIN — Medication 100 MILLIGRAM(S): at 23:05

## 2019-02-01 RX ADMIN — Medication 2 UNIT(S): at 12:02

## 2019-02-01 RX ADMIN — Medication 2: at 12:03

## 2019-02-01 RX ADMIN — Medication 1 SPRAY(S): at 06:01

## 2019-02-01 RX ADMIN — Medication 1: at 21:53

## 2019-02-01 RX ADMIN — SIMVASTATIN 20 MILLIGRAM(S): 20 TABLET, FILM COATED ORAL at 21:53

## 2019-02-01 NOTE — PROGRESS NOTE ADULT - PROBLEM SELECTOR PLAN 1
- IR: cancelled perma cath exchange today as well.   will try to get shiley and HD after that   -Cr of 7. and BUN of 58 on admission ; baseline back in Oct 2018 was Cr of 1.04 and BUN of 19; no improvement was noted over few days, so started on new dialysis on 1/25 via rt permacath   - Permcath malfunction on 1/29,   - IR guided Kidney biopsy scheduled for next week, aspirin held since 1/29  - PPD and hepatitis panel negative , social work f/u for setting up dialysis outpatient  - Monitor BMP  - Avoid nephrotoxic medications   - Nephro follow up

## 2019-02-01 NOTE — PHYSICAL THERAPY INITIAL EVALUATION ADULT - LIVES WITH, PROFILE
Patient lives in AL facility. Patient does not need to negotiate any stairs as there is an elevator./alone

## 2019-02-01 NOTE — PHYSICAL THERAPY INITIAL EVALUATION ADULT - PLANNED THERAPY INTERVENTIONS, PT EVAL
bed mobility training/transfer training/gait training/postural re-education/strengthening/balance training

## 2019-02-01 NOTE — PROGRESS NOTE ADULT - SUBJECTIVE AND OBJECTIVE BOX
CC: No SOB, fever or chills.    Vital Signs Last 24 Hrs  T(C): 36.8 (01 Feb 2019 15:58), Max: 37.3 (01 Feb 2019 08:13)  T(F): 98.3 (01 Feb 2019 15:58), Max: 99.2 (01 Feb 2019 08:13)  HR: 81 (01 Feb 2019 15:58) (60 - 82)  BP: 132/60 (01 Feb 2019 15:58) (124/56 - 152/66)  BP(mean): --  RR: 18 (01 Feb 2019 15:58) (16 - 18)  SpO2: 100% (01 Feb 2019 15:58) (98% - 100%)    PHYSICAL EXAM:  Constitutional: well developed, well nourished  and in nad  HEENT: Sclera anicteric  Neck: No JVD, PC noted Rt chest  Respiratory: clear  Cardiovascular: S1 and S2 normally heard  Gastrointestinal: soft, nondistended, nontender and normal bowel sounds heard  Extremities: No peripheral edema  Neurological: Awake, alert  Skin: Normal turgor    MEDICATIONS:  amLODIPine   Tablet 5 milliGRAM(s) Oral daily  benztropine 0.5 milliGRAM(s) Oral two times a day  calcium acetate 667 milliGRAM(s) Oral three times a day with meals  dextrose 40% Gel 15 Gram(s) Oral once PRN  dextrose 50% Injectable 12.5 Gram(s) IV Push once  dextrose 50% Injectable 25 Gram(s) IV Push once  dextrose 50% Injectable 25 Gram(s) IV Push once  epoetin seema Injectable 6000 Unit(s) SubCutaneous <User Schedule>  fluticasone propionate 50 MICROgram(s)/spray Nasal Spray 1 Spray(s) Both Nostrils two times a day  glucagon  Injectable 1 milliGRAM(s) IntraMuscular once PRN  heparin  Injectable 5000 Unit(s) SubCutaneous every 8 hours  insulin glargine Injectable (LANTUS) 5 Unit(s) SubCutaneous at bedtime  insulin lispro (HumaLOG) corrective regimen sliding scale   SubCutaneous Before meals and at bedtime  insulin lispro Injectable (HumaLOG) 2 Unit(s) SubCutaneous three times a day before meals  levothyroxine 25 MICROGram(s) Oral daily  simvastatin 20 milliGRAM(s) Oral at bedtime      LABS:                        9.9    8.2   )-----------( 218      ( 31 Jan 2019 08:28 )             31.6     01-31    133<L>  |  100  |  81<H>  ----------------------------<  202<H>  4.0   |  20<L>  |  8.56<H>    Ca    9.5      31 Jan 2019 08:28  Phos  5.4     01-31  Mg     2.3     01-31    TPro  8.2  /  Alb  3.8  /  TBili  0.4  /  DBili  x   /  AST  11  /  ALT  44  /  AlkPhos  196<H>  01-31    ASSESSMENT AND PLAN:     1. DEISY : No improvement of renal function. Last HD with very poor BFR on 01/29/2019  -vascular for Shiley HD catheter placement today  -HD is scheduled for today. Orders are written  -IR for P/C next week  2. Anemia  -continue  epogen for anemia of ckd   -f/u Hb   3.Htn:bp is controlled  -please  keep bp<130/80  - low salt diet  4.Metabolic acidosis : mild ,secondary to ckd  -on hd  -f/u co2   5.MBD:   -pt s  phos level  is high  -continue  phoslo   - pt has acceptable pth level-no intervention needed at this time

## 2019-02-01 NOTE — PROGRESS NOTE ADULT - ASSESSMENT
53 female from assisted living with PMH of DM, HLD, Schizophrenia came with complaint of vomiting, difficult to arouse and sweating. Per EMS note and Assisted living papers , patient  has been noted to be slightly lethargic since few months now with poor po intake. No history of renal failure. Patient noted to have acute renal failure with Cr of 7.74 and BUN of 58. Admitted for further evaluation and management.   Started on new dialysis last Friday via Permacath. PPD and Hepatitis panel negative.   Plan for IR guided kidney biopsy next week, aspirin held since 1/29.   Permacath not functioning well, IR was requested for exchange but could not do because of the tight schedule(Jan 30) , nobody was available in IR (Jan 31), scheduled tight today as well.

## 2019-02-02 LAB
ALBUMIN SERPL ELPH-MCNC: 3.8 G/DL — SIGNIFICANT CHANGE UP (ref 3.5–5)
ALP SERPL-CCNC: 196 U/L — HIGH (ref 40–120)
ALT FLD-CCNC: 34 U/L DA — SIGNIFICANT CHANGE UP (ref 10–60)
ANION GAP SERPL CALC-SCNC: 10 MMOL/L — SIGNIFICANT CHANGE UP (ref 5–17)
ANION GAP SERPL CALC-SCNC: 15 MMOL/L — SIGNIFICANT CHANGE UP (ref 5–17)
APTT BLD: 33.7 SEC — SIGNIFICANT CHANGE UP (ref 27.5–36.3)
AST SERPL-CCNC: 19 U/L — SIGNIFICANT CHANGE UP (ref 10–40)
BASOPHILS # BLD AUTO: 0.1 K/UL — SIGNIFICANT CHANGE UP (ref 0–0.2)
BASOPHILS NFR BLD AUTO: 1.1 % — SIGNIFICANT CHANGE UP (ref 0–2)
BILIRUB SERPL-MCNC: 0.3 MG/DL — SIGNIFICANT CHANGE UP (ref 0.2–1.2)
BUN SERPL-MCNC: 100 MG/DL — HIGH (ref 7–18)
BUN SERPL-MCNC: 53 MG/DL — HIGH (ref 7–18)
CALCIUM SERPL-MCNC: 9.2 MG/DL — SIGNIFICANT CHANGE UP (ref 8.4–10.5)
CALCIUM SERPL-MCNC: 9.8 MG/DL — SIGNIFICANT CHANGE UP (ref 8.4–10.5)
CHLORIDE SERPL-SCNC: 102 MMOL/L — SIGNIFICANT CHANGE UP (ref 96–108)
CHLORIDE SERPL-SCNC: 105 MMOL/L — SIGNIFICANT CHANGE UP (ref 96–108)
CO2 SERPL-SCNC: 17 MMOL/L — LOW (ref 22–31)
CO2 SERPL-SCNC: 23 MMOL/L — SIGNIFICANT CHANGE UP (ref 22–31)
CREAT SERPL-MCNC: 10.3 MG/DL — HIGH (ref 0.5–1.3)
CREAT SERPL-MCNC: 6.13 MG/DL — HIGH (ref 0.5–1.3)
EOSINOPHIL # BLD AUTO: 0.3 K/UL — SIGNIFICANT CHANGE UP (ref 0–0.5)
EOSINOPHIL NFR BLD AUTO: 3.6 % — SIGNIFICANT CHANGE UP (ref 0–6)
GLUCOSE BLDC GLUCOMTR-MCNC: 186 MG/DL — HIGH (ref 70–99)
GLUCOSE BLDC GLUCOMTR-MCNC: 209 MG/DL — HIGH (ref 70–99)
GLUCOSE BLDC GLUCOMTR-MCNC: 216 MG/DL — HIGH (ref 70–99)
GLUCOSE BLDC GLUCOMTR-MCNC: 230 MG/DL — HIGH (ref 70–99)
GLUCOSE SERPL-MCNC: 135 MG/DL — HIGH (ref 70–99)
GLUCOSE SERPL-MCNC: 191 MG/DL — HIGH (ref 70–99)
HCT VFR BLD CALC: 38 % — SIGNIFICANT CHANGE UP (ref 34.5–45)
HGB BLD-MCNC: 12.1 G/DL — SIGNIFICANT CHANGE UP (ref 11.5–15.5)
INR BLD: 0.97 RATIO — SIGNIFICANT CHANGE UP (ref 0.88–1.16)
LYMPHOCYTES # BLD AUTO: 2.1 K/UL — SIGNIFICANT CHANGE UP (ref 1–3.3)
LYMPHOCYTES # BLD AUTO: 28 % — SIGNIFICANT CHANGE UP (ref 13–44)
MAGNESIUM SERPL-MCNC: 2.5 MG/DL — SIGNIFICANT CHANGE UP (ref 1.6–2.6)
MCHC RBC-ENTMCNC: 27.3 PG — SIGNIFICANT CHANGE UP (ref 27–34)
MCHC RBC-ENTMCNC: 31.9 GM/DL — LOW (ref 32–36)
MCV RBC AUTO: 85.6 FL — SIGNIFICANT CHANGE UP (ref 80–100)
MONOCYTES # BLD AUTO: 0.7 K/UL — SIGNIFICANT CHANGE UP (ref 0–0.9)
MONOCYTES NFR BLD AUTO: 8.8 % — SIGNIFICANT CHANGE UP (ref 2–14)
NEUTROPHILS # BLD AUTO: 4.4 K/UL — SIGNIFICANT CHANGE UP (ref 1.8–7.4)
NEUTROPHILS NFR BLD AUTO: 58.4 % — SIGNIFICANT CHANGE UP (ref 43–77)
PHOSPHATE SERPL-MCNC: 5.8 MG/DL — HIGH (ref 2.5–4.5)
PLATELET # BLD AUTO: 213 K/UL — SIGNIFICANT CHANGE UP (ref 150–400)
POTASSIUM SERPL-MCNC: 3.8 MMOL/L — SIGNIFICANT CHANGE UP (ref 3.5–5.3)
POTASSIUM SERPL-MCNC: 5.5 MMOL/L — HIGH (ref 3.5–5.3)
POTASSIUM SERPL-SCNC: 3.8 MMOL/L — SIGNIFICANT CHANGE UP (ref 3.5–5.3)
POTASSIUM SERPL-SCNC: 5.5 MMOL/L — HIGH (ref 3.5–5.3)
PROT SERPL-MCNC: 8.5 G/DL — HIGH (ref 6–8.3)
PROTHROM AB SERPL-ACNC: 10.7 SEC — SIGNIFICANT CHANGE UP (ref 10–12.9)
RBC # BLD: 4.44 M/UL — SIGNIFICANT CHANGE UP (ref 3.8–5.2)
RBC # FLD: 13.2 % — SIGNIFICANT CHANGE UP (ref 10.3–14.5)
SODIUM SERPL-SCNC: 134 MMOL/L — LOW (ref 135–145)
SODIUM SERPL-SCNC: 138 MMOL/L — SIGNIFICANT CHANGE UP (ref 135–145)
WBC # BLD: 7.6 K/UL — SIGNIFICANT CHANGE UP (ref 3.8–10.5)
WBC # FLD AUTO: 7.6 K/UL — SIGNIFICANT CHANGE UP (ref 3.8–10.5)

## 2019-02-02 RX ORDER — SODIUM POLYSTYRENE SULFONATE 4.1 MEQ/G
30 POWDER, FOR SUSPENSION ORAL ONCE
Qty: 0 | Refills: 0 | Status: COMPLETED | OUTPATIENT
Start: 2019-02-02 | End: 2019-02-02

## 2019-02-02 RX ORDER — INSULIN HUMAN 100 [IU]/ML
10 INJECTION, SOLUTION SUBCUTANEOUS ONCE
Qty: 0 | Refills: 0 | Status: DISCONTINUED | OUTPATIENT
Start: 2019-02-02 | End: 2019-02-02

## 2019-02-02 RX ORDER — INSULIN HUMAN 100 [IU]/ML
10 INJECTION, SOLUTION SUBCUTANEOUS ONCE
Qty: 0 | Refills: 0 | Status: COMPLETED | OUTPATIENT
Start: 2019-02-02 | End: 2019-02-02

## 2019-02-02 RX ORDER — DEXTROSE 50 % IN WATER 50 %
50 SYRINGE (ML) INTRAVENOUS ONCE
Qty: 0 | Refills: 0 | Status: COMPLETED | OUTPATIENT
Start: 2019-02-02 | End: 2019-02-02

## 2019-02-02 RX ADMIN — SENNA PLUS 2 TABLET(S): 8.6 TABLET ORAL at 21:29

## 2019-02-02 RX ADMIN — Medication 2 UNIT(S): at 17:34

## 2019-02-02 RX ADMIN — HEPARIN SODIUM 5000 UNIT(S): 5000 INJECTION INTRAVENOUS; SUBCUTANEOUS at 06:32

## 2019-02-02 RX ADMIN — AMLODIPINE BESYLATE 5 MILLIGRAM(S): 2.5 TABLET ORAL at 06:32

## 2019-02-02 RX ADMIN — INSULIN HUMAN 10 UNIT(S): 100 INJECTION, SOLUTION SUBCUTANEOUS at 10:13

## 2019-02-02 RX ADMIN — Medication 667 MILLIGRAM(S): at 17:33

## 2019-02-02 RX ADMIN — Medication 2: at 11:54

## 2019-02-02 RX ADMIN — Medication 1 SPRAY(S): at 17:32

## 2019-02-02 RX ADMIN — Medication 0.5 MILLIGRAM(S): at 17:33

## 2019-02-02 RX ADMIN — SIMVASTATIN 20 MILLIGRAM(S): 20 TABLET, FILM COATED ORAL at 21:29

## 2019-02-02 RX ADMIN — Medication 100 MILLIGRAM(S): at 17:33

## 2019-02-02 RX ADMIN — Medication 1 SPRAY(S): at 06:32

## 2019-02-02 RX ADMIN — Medication 1: at 08:37

## 2019-02-02 RX ADMIN — ERYTHROPOIETIN 6000 UNIT(S): 10000 INJECTION, SOLUTION INTRAVENOUS; SUBCUTANEOUS at 15:14

## 2019-02-02 RX ADMIN — Medication 25 MICROGRAM(S): at 06:32

## 2019-02-02 RX ADMIN — Medication 0.5 MILLIGRAM(S): at 06:32

## 2019-02-02 RX ADMIN — Medication 2: at 21:30

## 2019-02-02 RX ADMIN — Medication 50 MILLILITER(S): at 10:14

## 2019-02-02 RX ADMIN — Medication 100 MILLIGRAM(S): at 06:32

## 2019-02-02 RX ADMIN — Medication 2 UNIT(S): at 11:54

## 2019-02-02 RX ADMIN — SODIUM POLYSTYRENE SULFONATE 30 GRAM(S): 4.1 POWDER, FOR SUSPENSION ORAL at 09:37

## 2019-02-02 RX ADMIN — Medication 667 MILLIGRAM(S): at 11:54

## 2019-02-02 RX ADMIN — INSULIN GLARGINE 5 UNIT(S): 100 INJECTION, SOLUTION SUBCUTANEOUS at 21:30

## 2019-02-02 RX ADMIN — Medication 2: at 17:34

## 2019-02-02 RX ADMIN — HEPARIN SODIUM 5000 UNIT(S): 5000 INJECTION INTRAVENOUS; SUBCUTANEOUS at 21:29

## 2019-02-02 NOTE — PROCEDURE NOTE - NSPROCDETAILS_GEN_ALL_CORE
lumen(s) aspirated and flushed/sterile dressing applied/sterile technique, catheter placed/guidewire recovered/ultrasound guidance

## 2019-02-02 NOTE — CHART NOTE - NSCHARTNOTEFT_GEN_A_CORE
Patient consented for urgent manuel placement. Consent form filled, taken verbal consent over phone from sister.   Called vascular for urgent manuel placement.   Talked to nephro Dr Lemus, patient needs urgent dialysis today. Patient was signed out for assessment for need for HD since patient was not able to get HD from last 3 days since Perm cath is not working. Patient seen and examined at bedside, patient is very lethargic and sleepy likely due to increasing BUN.     Reviewed patient labs: BUN increased from 36 to 100. Cr increased from 4.73 to 10.30. Potassium is on higher side also.  Patient needs urgent HD.     Consulted Nephro and nephro agrees for HD today. Since Permt cath is not Patient consented for urgent manuel placement. Consent form filled, taken verbal consent over phone from sister.   Called vascular for urgent manuel placement.   Talked to nephro Dr Lemus, patient needs urgent dialysis today. Patient was signed out for assessment for need for HD since patient was not able to get HD from last 3 days since Perm cath is not working. Patient seen and examined at bedside, patient is very lethargic and sleepy likely due to increasing BUN.     Reviewed patient labs: BUN increased from 36 to 100. Cr increased from 4.73 to 10.30. Potassium is on higher side also.  Patient needs urgent HD.     Consulted Nephro and nephro agrees for HD today. Since Permt cath is not working, consulted vascular for urgent Manuel placement.   Patient consented for urgent manuel placement. Consent form filled, taken verbal consent over phone from sister since patient is lethargic.   Talked to nephro Dr Lemus, patient needs urgent dialysis today.

## 2019-02-02 NOTE — PROGRESS NOTE ADULT - SUBJECTIVE AND OBJECTIVE BOX
CC: Patient is awake. No SOB, fever or chills.    Vital Signs Last 24 Hrs  T(C): 36.7 (02 Feb 2019 16:00), Max: 37.2 (02 Feb 2019 07:48)  T(F): 98 (02 Feb 2019 16:00), Max: 98.9 (02 Feb 2019 07:48)  HR: 68 (02 Feb 2019 16:00) (57 - 78)  BP: 122/76 (02 Feb 2019 16:00) (122/69 - 152/74)  BP(mean): --  RR: 17 (02 Feb 2019 16:00) (16 - 18)  SpO2: 98% (02 Feb 2019 07:48) (98% - 100%)    PHYSICAL EXAM:  Constitutional: well developed, well nourished  and in nad  HEENT: Sclera anicteric  Neck: No JVD, PC noted Rt chest  Respiratory: clear  Cardiovascular: S1 and S2 normally heard  Gastrointestinal: soft, nondistended, nontender and normal bowel sounds heard  Extremities: No peripheral edema  Neurological: Awake, alert  Skin: Normal turgor    MEDICATIONS:  amLODIPine   Tablet 5 milliGRAM(s) Oral daily  benztropine 0.5 milliGRAM(s) Oral two times a day  calcium acetate 667 milliGRAM(s) Oral three times a day with meals  docusate sodium 100 milliGRAM(s) Oral two times a day  epoetin seema Injectable 6000 Unit(s) SubCutaneous <User Schedule>  fluticasone propionate 50 MICROgram(s)/spray Nasal Spray 1 Spray(s) Both Nostrils two times a day  heparin  Injectable 5000 Unit(s) SubCutaneous every 8 hours  insulin glargine Injectable (LANTUS) 5 Unit(s) SubCutaneous at bedtime  insulin lispro (HumaLOG) corrective regimen sliding scale   SubCutaneous Before meals and at bedtime  insulin lispro Injectable (HumaLOG) 2 Unit(s) SubCutaneous three times a day before meals  levothyroxine 25 MICROGram(s) Oral daily  senna 2 Tablet(s) Oral at bedtime  simvastatin 20 milliGRAM(s) Oral at bedtime      LABS:                        12.1   7.6   )-----------( 213      ( 02 Feb 2019 06:31 )             38.0     02-02    138  |  105  |  53<H>  ----------------------------<  191<H>  3.8   |  23  |  6.13<H>    Ca    9.2      02 Feb 2019 17:37  Phos  5.8     02-02  Mg     2.5     02-02    TPro  8.5<H>  /  Alb  3.8  /  TBili  0.3  /  DBili  x   /  AST  19  /  ALT  34  /  AlkPhos  196<H>  02-02    PT/INR - ( 02 Feb 2019 11:05 )   PT: 10.7 sec;   INR: 0.97 ratio         PTT - ( 02 Feb 2019 11:05 )  PTT:33.7 sec    ASSESSMENT AND PLAN:     1. DEISY : No improvement of renal function. Tolerating HD via [employer] today.  -IR for P/C next week  2. Anemia  -continue  epogen for anemia of ckd   -f/u Hb   3.Htn:bp is controlled  -please  keep bp<130/80  - low salt diet  4.Metabolic acidosis : mild ,secondary to ckd  -on hd  -f/u co2   5.MBD:   -pt s  phos level  is high  -continue  phoslo   - pt has acceptable pth level-no intervention needed at this time

## 2019-02-03 LAB
GLUCOSE BLDC GLUCOMTR-MCNC: 160 MG/DL — HIGH (ref 70–99)
GLUCOSE BLDC GLUCOMTR-MCNC: 184 MG/DL — HIGH (ref 70–99)
GLUCOSE BLDC GLUCOMTR-MCNC: 212 MG/DL — HIGH (ref 70–99)
GLUCOSE BLDC GLUCOMTR-MCNC: 217 MG/DL — HIGH (ref 70–99)

## 2019-02-03 RX ADMIN — Medication 0.5 MILLIGRAM(S): at 06:22

## 2019-02-03 RX ADMIN — Medication 100 MILLIGRAM(S): at 17:40

## 2019-02-03 RX ADMIN — Medication 1 SPRAY(S): at 17:40

## 2019-02-03 RX ADMIN — Medication 2 UNIT(S): at 08:52

## 2019-02-03 RX ADMIN — Medication 100 MILLIGRAM(S): at 06:22

## 2019-02-03 RX ADMIN — Medication 2: at 21:47

## 2019-02-03 RX ADMIN — Medication 667 MILLIGRAM(S): at 11:34

## 2019-02-03 RX ADMIN — SIMVASTATIN 20 MILLIGRAM(S): 20 TABLET, FILM COATED ORAL at 21:47

## 2019-02-03 RX ADMIN — Medication 1: at 11:34

## 2019-02-03 RX ADMIN — Medication 2 UNIT(S): at 11:34

## 2019-02-03 RX ADMIN — Medication 667 MILLIGRAM(S): at 08:52

## 2019-02-03 RX ADMIN — SENNA PLUS 2 TABLET(S): 8.6 TABLET ORAL at 21:47

## 2019-02-03 RX ADMIN — Medication 1 SPRAY(S): at 06:21

## 2019-02-03 RX ADMIN — Medication 2 UNIT(S): at 17:41

## 2019-02-03 RX ADMIN — Medication 0.5 MILLIGRAM(S): at 17:40

## 2019-02-03 RX ADMIN — HEPARIN SODIUM 5000 UNIT(S): 5000 INJECTION INTRAVENOUS; SUBCUTANEOUS at 14:45

## 2019-02-03 RX ADMIN — Medication 667 MILLIGRAM(S): at 17:40

## 2019-02-03 RX ADMIN — Medication 2: at 17:41

## 2019-02-03 RX ADMIN — Medication 1: at 08:52

## 2019-02-03 RX ADMIN — AMLODIPINE BESYLATE 5 MILLIGRAM(S): 2.5 TABLET ORAL at 06:21

## 2019-02-03 RX ADMIN — Medication 25 MICROGRAM(S): at 06:21

## 2019-02-03 RX ADMIN — INSULIN GLARGINE 5 UNIT(S): 100 INJECTION, SOLUTION SUBCUTANEOUS at 21:47

## 2019-02-03 NOTE — PROGRESS NOTE ADULT - ASSESSMENT
53 female from assisted living with PMH of DM, HLD, Schizophrenia came with complaint of vomiting, difficult to arouse and sweating. Per EMS note and Assisted living papers , patient  has been noted to be slightly lethargic since few months now with poor po intake. No history of renal failure. Patient noted to have acute renal failure with Cr of 7.74 and BUN of 58. Admitted for further evaluation and management.   Started on new dialysis this admission via Permacath. PPD and Hepatitis panel negative.   Plan for IR guided kidney biopsy next week, aspirin held since 1/29.   Permacath not functioning well, IR was requested for exchange but could not do because of the tight schedule(Jan 30) , nobody was available in IR (Jan 31), cancelled again on Feb 1, urgent Shiley and HD on Feb 2

## 2019-02-03 NOTE — CONSULT NOTE ADULT - SUBJECTIVE AND OBJECTIVE BOX
Time of visit:    CHIEF COMPLAINT: Patient is a 53y old  Female who presents with a chief complaint of vomiting (03 Feb 2019 05:04)      HPI:  53 female with PMH of DM , Schizophrenia , HLD , comes in with complaint of vomiting. Patient was BIB EMS to the ED for from assisted living with the report that patient was difficult to arouse, hot and sweating. In ED, patient states that she felt like sleeping after having her medication and did not want to wake up for 3 hours. Reports having nose bleeding and episode of NBNP vomiting after which she fell asleep for 3 hours. Patient denies feeling sweaty or hot or losing consciousness. Patient has no complaints a this time. Patient denies recent illness, pain, headache, shortness of breath, dizziness or any other symptoms. Per EMS note and Assisted living papers , patient  has been noted to be slightly lethargic since few months now with poor po intake.     In ED, BP : 121/70 mm hg, HR: 72 , Temp : 98.4 F  Cbc shows hb of 8.8 , with normal MCV  Bmp shows elevated BUN/ Creatinine (58/7.74)  T1 negative   EKG shows :   CXR shows mild increased interstitial markings (19 Jan 2019 23:39)   Patient seen and examined.     PAST MEDICAL & SURGICAL HISTORY:  CAD (coronary artery disease)  Bipolar disorder  Schizophrenia  Diabetes  No significant past surgical history      Allergies    No Known Allergies    Intolerances        MEDICATIONS  (STANDING):  amLODIPine   Tablet 5 milliGRAM(s) Oral daily  benztropine 0.5 milliGRAM(s) Oral two times a day  calcium acetate 667 milliGRAM(s) Oral three times a day with meals  docusate sodium 100 milliGRAM(s) Oral two times a day  epoetin seema Injectable 6000 Unit(s) SubCutaneous <User Schedule>  fluticasone propionate 50 MICROgram(s)/spray Nasal Spray 1 Spray(s) Both Nostrils two times a day  heparin  Injectable 5000 Unit(s) SubCutaneous every 8 hours  insulin glargine Injectable (LANTUS) 5 Unit(s) SubCutaneous at bedtime  insulin lispro (HumaLOG) corrective regimen sliding scale   SubCutaneous Before meals and at bedtime  insulin lispro Injectable (HumaLOG) 2 Unit(s) SubCutaneous three times a day before meals  levothyroxine 25 MICROGram(s) Oral daily  senna 2 Tablet(s) Oral at bedtime  simvastatin 20 milliGRAM(s) Oral at bedtime      MEDICATIONS  (PRN):   Medications up to date at time of exam.    Medications up to date at time of exam.    FAMILY HISTORY:  Family history of diabetes mellitus (Father)      SOCIAL HISTORY  Smoking History: [   ] smoking/smoke exposure, [   ] former smoker  Living Condition: [   ] apartment, [   ] private house  Work History:   Travel History: denies recent travel  Illicit Substance Use: denies  Alcohol Use: denies    REVIEW OF SYSTEMS:    CONSTITUTIONAL:  denies fevers, chills, sweats, weight loss    HEENT:  denies diplopia or blurred vision, sore throat or runny nose.    CARDIOVASCULAR:  denies pressure, squeezing, tightness, or heaviness about the chest; no palpitations.    RESPIRATORY:  denies SOB, cough, KEY, wheezing.    GASTROINTESTINAL:  denies abdominal pain, nausea, vomiting or diarrhea.    GENITOURINARY: denies dysuria, frequency or urgency.    NEUROLOGIC:  denies numbness, tingling, seizures or weakness.    PSYCHIATRIC:  denies disorder of thought or mood.    MSK: denies swelling, redness      PHYSICAL EXAMINATION:    GENERAL: The patient is a well-developed, well-nourished, in no apparent distress.     Vital Signs Last 24 Hrs  T(C): 37.3 (03 Feb 2019 07:59), Max: 37.8 (02 Feb 2019 23:29)  T(F): 99.1 (03 Feb 2019 07:59), Max: 100 (02 Feb 2019 23:29)  HR: 67 (03 Feb 2019 07:59) (66 - 72)  BP: 120/55 (03 Feb 2019 07:59) (110/55 - 122/76)  BP(mean): --  RR: 17 (03 Feb 2019 07:59) (16 - 18)  SpO2: 100% (03 Feb 2019 07:59) (99% - 100%)   (if applicable)    Chest Tube (if applicable)    HEENT: Head is normocephalic and atraumatic. Extraocular muscles are intact. Mucous membranes are moist.     NECK: Supple, no palpable adenopathy.    LUNGS: Clear to auscultation, no wheezing, rales, or rhonchi.    HEART: Regular rate and rhythm without murmur.    ABDOMEN: Soft, nontender, and nondistended.  No hepatosplenomegaly is noted.    RENAL: No difficulty voiding, no pelvic pain    EXTREMITIES: Without any cyanosis, clubbing, rash, lesions or edema.    NEUROLOGIC: Awake, alert, oriented, grossly intact    SKIN: Warm, dry, good turgor.      LABS:                        12.1   7.6   )-----------( 213      ( 02 Feb 2019 06:31 )             38.0     02-02    138  |  105  |  53<H>  ----------------------------<  191<H>  3.8   |  23  |  6.13<H>    Ca    9.2      02 Feb 2019 17:37  Phos  5.8     02-02  Mg     2.5     02-02    TPro  8.5<H>  /  Alb  3.8  /  TBili  0.3  /  DBili  x   /  AST  19  /  ALT  34  /  AlkPhos  196<H>  02-02    PT/INR - ( 02 Feb 2019 11:05 )   PT: 10.7 sec;   INR: 0.97 ratio         PTT - ( 02 Feb 2019 11:05 )  PTT:33.7 sec      CARDIAC MARKERS ( 01 Feb 2019 13:03 )  <0.015 ng/mL / x     / 51 U/L / x     / <1.0 ng/mL                MICROBIOLOGY: (if applicable)    RADIOLOGY & ADDITIONAL STUDIES:  EKG:   CXR:  ECHO:    IMPRESSION: 53y Female PAST MEDICAL & SURGICAL HISTORY:  CAD (coronary artery disease)  Bipolar disorder  Schizophrenia  Diabetes  No significant past surgical history   p/w                   RECOMMENDATIONS:

## 2019-02-03 NOTE — PROGRESS NOTE ADULT - SUBJECTIVE AND OBJECTIVE BOX
PGY 1 Note discussed with supervising resident and primary attending    Patient is a 53y old  Female who presents with a chief complaint of vomiting (02 Feb 2019 18:19)      INTERVAL HPI/OVERNIGHT EVENTS:   Patient got urgent Shiley placement yesterday and received HD yesterday   Stable for now     MEDICATIONS  (STANDING):  amLODIPine   Tablet 5 milliGRAM(s) Oral daily  benztropine 0.5 milliGRAM(s) Oral two times a day  calcium acetate 667 milliGRAM(s) Oral three times a day with meals  docusate sodium 100 milliGRAM(s) Oral two times a day  epoetin seema Injectable 6000 Unit(s) SubCutaneous <User Schedule>  fluticasone propionate 50 MICROgram(s)/spray Nasal Spray 1 Spray(s) Both Nostrils two times a day  heparin  Injectable 5000 Unit(s) SubCutaneous every 8 hours  insulin glargine Injectable (LANTUS) 5 Unit(s) SubCutaneous at bedtime  insulin lispro (HumaLOG) corrective regimen sliding scale   SubCutaneous Before meals and at bedtime  insulin lispro Injectable (HumaLOG) 2 Unit(s) SubCutaneous three times a day before meals  levothyroxine 25 MICROGram(s) Oral daily  senna 2 Tablet(s) Oral at bedtime  simvastatin 20 milliGRAM(s) Oral at bedtime    MEDICATIONS  (PRN):      __________________________________________________  REVIEW OF SYSTEMS:    CONSTITUTIONAL: No fever,   EYES: no acute visual disturbances  NECK: No pain or stiffness  RESPIRATORY: No cough; No shortness of breath  CARDIOVASCULAR: No chest pain, no palpitations  GASTROINTESTINAL: No pain. No nausea or vomiting; No diarrhea   NEUROLOGICAL: No headache or numbness, no tremors  MUSCULOSKELETAL: No joint pain, no muscle pain  GENITOURINARY: no dysuria, no frequency, no hesitancy  PSYCHIATRY: no depression , no anxiety  ALL OTHER  ROS negative        Vital Signs Last 24 Hrs  T(C): 37.8 (02 Feb 2019 23:29), Max: 37.8 (02 Feb 2019 23:29)  T(F): 100 (02 Feb 2019 23:29), Max: 100 (02 Feb 2019 23:29)  HR: 72 (02 Feb 2019 23:29) (57 - 72)  BP: 110/55 (02 Feb 2019 23:29) (110/55 - 132/59)  BP(mean): --  RR: 16 (02 Feb 2019 23:29) (16 - 18)  SpO2: 99% (02 Feb 2019 23:29) (98% - 99%)    ________________________________________________  PHYSICAL EXAM:  GENERAL: NAD  HEENT: Normocephalic;  conjunctivae and sclerae clear; moist mucous membranes;   NECK : supple  CHEST/LUNG: Clear to auscultation bilaterally with good air entry   HEART: S1 S2  regular; no murmurs, gallops or rubs  ABDOMEN: Soft, Nontender, Nondistended; Bowel sounds present  EXTREMITIES: no cyanosis; no edema; no calf tenderness  SKIN: warm and dry; no rash  NERVOUS SYSTEM:  Awake and alert; Oriented  to place, person and time ; no new deficits    _________________________________________________  LABS:                        12.1   7.6   )-----------( 213      ( 02 Feb 2019 06:31 )             38.0     02-02    138  |  105  |  53<H>  ----------------------------<  191<H>  3.8   |  23  |  6.13<H>    Ca    9.2      02 Feb 2019 17:37  Phos  5.8     02-02  Mg     2.5     02-02    TPro  8.5<H>  /  Alb  3.8  /  TBili  0.3  /  DBili  x   /  AST  19  /  ALT  34  /  AlkPhos  196<H>  02-02    PT/INR - ( 02 Feb 2019 11:05 )   PT: 10.7 sec;   INR: 0.97 ratio         PTT - ( 02 Feb 2019 11:05 )  PTT:33.7 sec    CAPILLARY BLOOD GLUCOSE      POCT Blood Glucose.: 230 mg/dL (02 Feb 2019 21:09)  POCT Blood Glucose.: 216 mg/dL (02 Feb 2019 17:02)  POCT Blood Glucose.: 209 mg/dL (02 Feb 2019 11:50)  POCT Blood Glucose.: 186 mg/dL (02 Feb 2019 08:28)        RADIOLOGY & ADDITIONAL TESTS:    Imaging Personally Reviewed:  YES/    Consultant(s) Notes Reviewed:   YES/     Care Discussed with Consultants :     Plan of care was discussed with patient and /or primary care giver; all questions and concerns were addressed and care was aligned with patient's wishes.

## 2019-02-03 NOTE — PROGRESS NOTE ADULT - SUBJECTIVE AND OBJECTIVE BOX
CC: No SOB, fever or chills.    Vital Signs Last 24 Hrs  T(C): 36.5 (03 Feb 2019 15:36), Max: 37.8 (02 Feb 2019 23:29)  T(F): 97.7 (03 Feb 2019 15:36), Max: 100 (02 Feb 2019 23:29)  HR: 64 (03 Feb 2019 15:36) (64 - 72)  BP: 116/55 (03 Feb 2019 15:36) (110/55 - 120/55)  BP(mean): --  RR: 16 (03 Feb 2019 15:36) (16 - 17)  SpO2: 97% (03 Feb 2019 15:36) (97% - 100%)    PHYSICAL EXAM:  Constitutional: well developed, well nourished  and in nad  HEENT: Sclera anicteric  Neck: No JVD  Respiratory: clear  Cardiovascular: S1 and S2 normally heard  Gastrointestinal: soft, nondistended, nontender and normal bowel sounds heard  Extremities: No peripheral edema  Neurological: Awake, alert  Skin: Normal turgor    MEDICATIONS:  amLODIPine   Tablet 5 milliGRAM(s) Oral daily  benztropine 0.5 milliGRAM(s) Oral two times a day  calcium acetate 667 milliGRAM(s) Oral three times a day with meals  docusate sodium 100 milliGRAM(s) Oral two times a day  epoetin seema Injectable 6000 Unit(s) SubCutaneous <User Schedule>  fluticasone propionate 50 MICROgram(s)/spray Nasal Spray 1 Spray(s) Both Nostrils two times a day  heparin  Injectable 5000 Unit(s) SubCutaneous every 8 hours  insulin glargine Injectable (LANTUS) 5 Unit(s) SubCutaneous at bedtime  insulin lispro (HumaLOG) corrective regimen sliding scale   SubCutaneous Before meals and at bedtime  insulin lispro Injectable (HumaLOG) 2 Unit(s) SubCutaneous three times a day before meals  levothyroxine 25 MICROGram(s) Oral daily  senna 2 Tablet(s) Oral at bedtime  simvastatin 20 milliGRAM(s) Oral at bedtime      LABS:                        12.1   7.6   )-----------( 213      ( 02 Feb 2019 06:31 )             38.0     02-02    138  |  105  |  53<H>  ----------------------------<  191<H>  3.8   |  23  |  6.13<H>    Ca    9.2      02 Feb 2019 17:37  Phos  5.8     02-02  Mg     2.5     02-02    TPro  8.5<H>  /  Alb  3.8  /  TBili  0.3  /  DBili  x   /  AST  19  /  ALT  34  /  AlkPhos  196<H>  02-02    PT/INR - ( 02 Feb 2019 11:05 )   PT: 10.7 sec;   INR: 0.97 ratio    PTT - ( 02 Feb 2019 11:05 )  PTT:33.7 sec    ASSESSMENT AND PLAN:     1. DEISY : No improvement of renal function.   -HD TIW  -IR for P/C exchange next week  2. Anemia  -continue  epogen for anemia of ckd   -f/u Hb   3.Htn:bp is controlled  -please  keep bp<130/80  - low salt diet  4.Metabolic acidosis : mild ,secondary to ckd  -on hd  -f/u co2   5.MBD:   -pt s  phos level  is high  -continue  phoslo   - pt has acceptable pth level-no intervention needed at this time

## 2019-02-03 NOTE — PROGRESS NOTE ADULT - PROBLEM SELECTOR PLAN 1
- IR: permacath exchange : hopefully on Monday   -Cr of 7. and BUN of 58 on admission ; baseline back in Oct 2018 was Cr of 1.04 and BUN of 19; no improvement was noted over few days, so started on new dialysis on 1/25 via rt permacath   - Permcath malfunction on 1/29,   - IR guided Kidney biopsy scheduled for next week, aspirin held since 1/29  - PPD and hepatitis panel negative , social work f/u for setting up dialysis outpatient  - Monitor BMP  - Avoid nephrotoxic medications   - Nephro follow up  -Received Shiley and HD on Saturday

## 2019-02-04 LAB
ALBUMIN SERPL ELPH-MCNC: 3.5 G/DL — SIGNIFICANT CHANGE UP (ref 3.5–5)
ALP SERPL-CCNC: 169 U/L — HIGH (ref 40–120)
ALT FLD-CCNC: 23 U/L DA — SIGNIFICANT CHANGE UP (ref 10–60)
ANION GAP SERPL CALC-SCNC: 14 MMOL/L — SIGNIFICANT CHANGE UP (ref 5–17)
AST SERPL-CCNC: 12 U/L — SIGNIFICANT CHANGE UP (ref 10–40)
BASOPHILS # BLD AUTO: 0.1 K/UL — SIGNIFICANT CHANGE UP (ref 0–0.2)
BASOPHILS NFR BLD AUTO: 1.2 % — SIGNIFICANT CHANGE UP (ref 0–2)
BILIRUB SERPL-MCNC: 0.3 MG/DL — SIGNIFICANT CHANGE UP (ref 0.2–1.2)
BUN SERPL-MCNC: 84 MG/DL — HIGH (ref 7–18)
CALCIUM SERPL-MCNC: 9.4 MG/DL — SIGNIFICANT CHANGE UP (ref 8.4–10.5)
CHLORIDE SERPL-SCNC: 100 MMOL/L — SIGNIFICANT CHANGE UP (ref 96–108)
CO2 SERPL-SCNC: 23 MMOL/L — SIGNIFICANT CHANGE UP (ref 22–31)
CREAT SERPL-MCNC: 9.42 MG/DL — HIGH (ref 0.5–1.3)
EOSINOPHIL # BLD AUTO: 0.3 K/UL — SIGNIFICANT CHANGE UP (ref 0–0.5)
EOSINOPHIL NFR BLD AUTO: 3.3 % — SIGNIFICANT CHANGE UP (ref 0–6)
GLUCOSE BLDC GLUCOMTR-MCNC: 171 MG/DL — HIGH (ref 70–99)
GLUCOSE BLDC GLUCOMTR-MCNC: 189 MG/DL — HIGH (ref 70–99)
GLUCOSE BLDC GLUCOMTR-MCNC: 189 MG/DL — HIGH (ref 70–99)
GLUCOSE BLDC GLUCOMTR-MCNC: 198 MG/DL — HIGH (ref 70–99)
GLUCOSE SERPL-MCNC: 189 MG/DL — HIGH (ref 70–99)
HCT VFR BLD CALC: 29.1 % — LOW (ref 34.5–45)
HGB BLD-MCNC: 9.2 G/DL — LOW (ref 11.5–15.5)
LYMPHOCYTES # BLD AUTO: 1.9 K/UL — SIGNIFICANT CHANGE UP (ref 1–3.3)
LYMPHOCYTES # BLD AUTO: 21.4 % — SIGNIFICANT CHANGE UP (ref 13–44)
MAGNESIUM SERPL-MCNC: 2.3 MG/DL — SIGNIFICANT CHANGE UP (ref 1.6–2.6)
MCHC RBC-ENTMCNC: 27.5 PG — SIGNIFICANT CHANGE UP (ref 27–34)
MCHC RBC-ENTMCNC: 31.6 GM/DL — LOW (ref 32–36)
MCV RBC AUTO: 86.9 FL — SIGNIFICANT CHANGE UP (ref 80–100)
MONOCYTES # BLD AUTO: 0.8 K/UL — SIGNIFICANT CHANGE UP (ref 0–0.9)
MONOCYTES NFR BLD AUTO: 9.5 % — SIGNIFICANT CHANGE UP (ref 2–14)
NEUTROPHILS # BLD AUTO: 5.6 K/UL — SIGNIFICANT CHANGE UP (ref 1.8–7.4)
NEUTROPHILS NFR BLD AUTO: 64.7 % — SIGNIFICANT CHANGE UP (ref 43–77)
PHOSPHATE SERPL-MCNC: 6.6 MG/DL — HIGH (ref 2.5–4.5)
PLATELET # BLD AUTO: 197 K/UL — SIGNIFICANT CHANGE UP (ref 150–400)
POTASSIUM SERPL-MCNC: 3.4 MMOL/L — LOW (ref 3.5–5.3)
POTASSIUM SERPL-SCNC: 3.4 MMOL/L — LOW (ref 3.5–5.3)
PROT SERPL-MCNC: 8 G/DL — SIGNIFICANT CHANGE UP (ref 6–8.3)
RBC # BLD: 3.34 M/UL — LOW (ref 3.8–5.2)
RBC # FLD: 13.4 % — SIGNIFICANT CHANGE UP (ref 10.3–14.5)
SODIUM SERPL-SCNC: 137 MMOL/L — SIGNIFICANT CHANGE UP (ref 135–145)
WBC # BLD: 8.7 K/UL — SIGNIFICANT CHANGE UP (ref 3.8–10.5)
WBC # FLD AUTO: 8.7 K/UL — SIGNIFICANT CHANGE UP (ref 3.8–10.5)

## 2019-02-04 PROCEDURE — 77001 FLUOROGUIDE FOR VEIN DEVICE: CPT | Mod: 26

## 2019-02-04 PROCEDURE — 99221 1ST HOSP IP/OBS SF/LOW 40: CPT

## 2019-02-04 PROCEDURE — 36581 REPLACE TUNNELED CV CATH: CPT | Mod: 78

## 2019-02-04 RX ORDER — CHLORHEXIDINE GLUCONATE 213 G/1000ML
1 SOLUTION TOPICAL
Qty: 0 | Refills: 0 | Status: DISCONTINUED | OUTPATIENT
Start: 2019-02-04 | End: 2019-02-14

## 2019-02-04 RX ORDER — CALCIUM ACETATE 667 MG
1334 TABLET ORAL
Qty: 0 | Refills: 0 | Status: DISCONTINUED | OUTPATIENT
Start: 2019-02-04 | End: 2019-02-14

## 2019-02-04 RX ADMIN — Medication 0.5 MILLIGRAM(S): at 06:02

## 2019-02-04 RX ADMIN — Medication 0.5 MILLIGRAM(S): at 17:27

## 2019-02-04 RX ADMIN — Medication 1: at 08:38

## 2019-02-04 RX ADMIN — Medication 1: at 23:21

## 2019-02-04 RX ADMIN — Medication 1: at 11:48

## 2019-02-04 RX ADMIN — Medication 100 MILLIGRAM(S): at 17:27

## 2019-02-04 RX ADMIN — Medication 667 MILLIGRAM(S): at 11:47

## 2019-02-04 RX ADMIN — HEPARIN SODIUM 5000 UNIT(S): 5000 INJECTION INTRAVENOUS; SUBCUTANEOUS at 23:14

## 2019-02-04 RX ADMIN — Medication 100 MILLIGRAM(S): at 06:02

## 2019-02-04 RX ADMIN — Medication 1334 MILLIGRAM(S): at 17:27

## 2019-02-04 RX ADMIN — SIMVASTATIN 20 MILLIGRAM(S): 20 TABLET, FILM COATED ORAL at 23:14

## 2019-02-04 RX ADMIN — Medication 1 SPRAY(S): at 17:32

## 2019-02-04 RX ADMIN — AMLODIPINE BESYLATE 5 MILLIGRAM(S): 2.5 TABLET ORAL at 06:02

## 2019-02-04 RX ADMIN — Medication 2 UNIT(S): at 08:38

## 2019-02-04 RX ADMIN — HEPARIN SODIUM 5000 UNIT(S): 5000 INJECTION INTRAVENOUS; SUBCUTANEOUS at 13:51

## 2019-02-04 RX ADMIN — Medication 2 UNIT(S): at 17:27

## 2019-02-04 RX ADMIN — Medication 2 UNIT(S): at 11:47

## 2019-02-04 RX ADMIN — Medication 1: at 17:27

## 2019-02-04 RX ADMIN — SENNA PLUS 2 TABLET(S): 8.6 TABLET ORAL at 23:14

## 2019-02-04 RX ADMIN — INSULIN GLARGINE 5 UNIT(S): 100 INJECTION, SOLUTION SUBCUTANEOUS at 23:22

## 2019-02-04 RX ADMIN — CHLORHEXIDINE GLUCONATE 1 APPLICATION(S): 213 SOLUTION TOPICAL at 11:43

## 2019-02-04 RX ADMIN — Medication 25 MICROGRAM(S): at 06:02

## 2019-02-04 RX ADMIN — Medication 1 SPRAY(S): at 06:02

## 2019-02-04 NOTE — PROGRESS NOTE ADULT - ASSESSMENT
A/P:  1. DEISY : No improvement of renal function. S/P PC Exchange today  -HD today and tomorrow  -renal biopsy on Wed , d/w SONIA LESTER   -HD orders written and discussed with dialysis RN  2. Anemia  -continue  epogen for anemia of ckd   -f/u Hb   3.Htn:bp is controlled  -please  keep bp<130/80  - low salt diet  4.Metabolic acidosis : mild ,secondary to ckd  -on hd  -f/u co2   5.MBD:   -pt s  phos level  is high  -continue  phoslo   - pt has acceptable pth level-no intervention needed at this time A/P:  1. DEISY :Etiology Unclear ,  r/o ATN ,  No improvement of renal function. S/P PC Exchange today  -HD today and tomorrow for renal optimization prior to renal biopsy on wed  -renal biopsy for tissue diagnosis on Wed , d/w SONIA LESTER   -HD orders written and discussed with dialysis RN  -Keep patient euvolemic and renal diet  -Avoid Nephrotoxic Meds/ Agents such as (NSAIDs, IV contrast, Aminoglycosides such as gentamicin, -Gadolinium contrast, Phosphate containing enemas, etc..)  -Adjust Medications according to eGFR  -f/u bmp daily  2. Anemia  -continue  epogen for anemia of ckd   -f/u Hb   3.Htn:bp is controlled  -please  keep bp<130/80  - low salt diet  4.Metabolic acidosis : now improved  -on hd  -f/u co2   5.MBD:   -pt s  phos level  is high  -continue  phoslo 1334 tid with meals and low phos diet  - pt has acceptable pth level-no intervention needed at this time

## 2019-02-04 NOTE — CONSULT NOTE ADULT - SUBJECTIVE AND OBJECTIVE BOX
R IJ pcath- revised by IR today  Shubham swan site ok.  R hand dom, +rad pulse. Periph veins not vis  LEs': no ulcers.  +DP pulses.    A/P:   ESRD  Cont HD via pcath  VERENICE swan once pcath working ok  If pt requres perm access- will plan outpt L UE AVF/G  Please no punctures in L UE

## 2019-02-04 NOTE — PROGRESS NOTE ADULT - PROBLEM SELECTOR PLAN 1
- IR today for permacath exchange, received dialysis through rt femoral shiley placed on Saturday  - IR guided Kidney biopsy possibly tomorrow or wed, aspirin held since 1/29  - PPD and hepatitis panel negative , social work f/u for setting up dialysis outpatient  - Monitor BMP  - Avoid nephrotoxic medications   - Nephro follow up

## 2019-02-04 NOTE — PROGRESS NOTE ADULT - SUBJECTIVE AND OBJECTIVE BOX
Resident Note discussed with  primary attending    Patient is a 53y old  Female who presents with a chief complaint of vomiting (03 Feb 2019 20:35)      INTERVAL HPI/ OVERNIGHT EVENTS: no new complaints. Patient had right femoral catheter placed over the weekend for dialysis as permacath was not working. IR today for permacath exchange.       MEDICATIONS  (STANDING):  amLODIPine   Tablet 5 milliGRAM(s) Oral daily  benztropine 0.5 milliGRAM(s) Oral two times a day  calcium acetate 667 milliGRAM(s) Oral three times a day with meals  chlorhexidine 4% Liquid 1 Application(s) Topical <User Schedule>  docusate sodium 100 milliGRAM(s) Oral two times a day  epoetin seema Injectable 6000 Unit(s) SubCutaneous <User Schedule>  fluticasone propionate 50 MICROgram(s)/spray Nasal Spray 1 Spray(s) Both Nostrils two times a day  heparin  Injectable 5000 Unit(s) SubCutaneous every 8 hours  insulin glargine Injectable (LANTUS) 5 Unit(s) SubCutaneous at bedtime  insulin lispro (HumaLOG) corrective regimen sliding scale   SubCutaneous Before meals and at bedtime  insulin lispro Injectable (HumaLOG) 2 Unit(s) SubCutaneous three times a day before meals  levothyroxine 25 MICROGram(s) Oral daily  senna 2 Tablet(s) Oral at bedtime  simvastatin 20 milliGRAM(s) Oral at bedtime    MEDICATIONS  (PRN):      __________________________________________________  REVIEW OF SYSTEMS:    CONSTITUTIONAL: tired and sleepy   EYES: no acute visual disturbances  NECK: No pain or stiffness  RESPIRATORY: No cough; No shortness of breath  CARDIOVASCULAR: No chest pain, no palpitations  GASTROINTESTINAL: No pain. No nausea or vomiting; No diarrhea   NEUROLOGICAL: No headache or numbness, no tremors  MUSCULOSKELETAL: No joint pain, no muscle pain  GENITOURINARY: no dysuria, no frequency, no hesitancy  PSYCHIATRY: no depression , no anxiety  ALL OTHER  ROS negative        Vital Signs Last 24 Hrs  T(C): 37.2 (04 Feb 2019 08:14), Max: 37.4 (03 Feb 2019 23:20)  T(F): 99 (04 Feb 2019 08:14), Max: 99.3 (03 Feb 2019 23:20)  HR: 65 (04 Feb 2019 08:14) (60 - 70)  BP: 119/50 (04 Feb 2019 08:14) (111/48 - 134/57)  BP(mean): --  RR: 16 (04 Feb 2019 08:14) (16 - 16)  SpO2: 95% (04 Feb 2019 08:14) (95% - 100%)    ________________________________________________  PHYSICAL EXAM:  GENERAL: NAD  HEENT: Normocephalic;  conjunctivae and sclerae clear; moist mucous membranes;   NECK : supple  CHEST/LUNG: Clear to auscultation bilaterally with good air entry   HEART: S1 S2  regular; no murmurs, gallops or rubs  ABDOMEN: Soft, Nontender, Nondistended; Bowel sounds present  EXTREMITIES: no cyanosis; no edema; no calf tenderness  NERVOUS SYSTEM:  sleepy; Oriented  to place, person and time ; no new deficits    _________________________________________________  LABS:                        9.2    8.7   )-----------( 197      ( 04 Feb 2019 07:39 )             29.1     02-04    137  |  100  |  84<H>  ----------------------------<  189<H>  3.4<L>   |  23  |  9.42<H>    Ca    9.4      04 Feb 2019 07:39  Phos  6.6     02-04  Mg     2.3     02-04    TPro  8.0  /  Alb  3.5  /  TBili  0.3  /  DBili  x   /  AST  12  /  ALT  23  /  AlkPhos  169<H>  02-04        CAPILLARY BLOOD GLUCOSE      POCT Blood Glucose.: 198 mg/dL (04 Feb 2019 08:35)  POCT Blood Glucose.: 217 mg/dL (03 Feb 2019 21:04)  POCT Blood Glucose.: 212 mg/dL (03 Feb 2019 16:31)  POCT Blood Glucose.: 184 mg/dL (03 Feb 2019 11:16)        Consultant(s) Notes Reviewed:   YES    Care Discussed with Consultants : YES    Plan of care was discussed with patient and /or primary care giver; all questions and concerns were addressed and care was aligned with patient's wishes.

## 2019-02-04 NOTE — PROGRESS NOTE ADULT - ASSESSMENT
53 female from assisted living with PMH of DM, HLD, Schizophrenia came with complaint of vomiting, difficult to arouse and sweating. Per EMS note and Assisted living papers , patient  has been noted to be slightly lethargic since few months now with poor po intake. No history of renal failure. Patient noted to have acute renal failure with Cr of 7.74 and BUN of 58. Admitted for further evaluation and management.   Started on new dialysis this admission via Permacath on 1/25 . PPD and Hepatitis panel negative.   Permacath malfunctioning noted last week on Tuesday, IR was requested for exchange last week but could not do because of the tight schedule, urgent Shiley and HD on Feb 2.

## 2019-02-04 NOTE — PROGRESS NOTE ADULT - SUBJECTIVE AND OBJECTIVE BOX
pt seen and examined.pts current chart reviewed and case discussed with resident covering.    SUBJECTIVE:  pt feels fine and denies cp,sob,gi or gu/uremic  symptons    REVIEW OF SYSTEMS:  CONSTITUTIONAL: No weakness, fevers or chills  EYES/ENT: No visual changes;  No vertigo or throat pain   NECK: No pain or stiffness  RESPIRATORY: No cough, wheezing, hemoptysis; No shortness of breath  CARDIOVASCULAR: No chest pain or palpitations  GASTROINTESTINAL: No abdominal or epigastric pain. No nausea, vomiting, or hematemesis; No diarrhea or constipation. No melena or hematochezia.  GENITOURINARY: No dysuria, frequency , hematuria, flank pain or nocturia  NEUROLOGICAL: No numbness or weakness  SKIN: No itching, burning, rashes, or lesions   All other review of systems is negative unless indicated above    Current meds:    amLODIPine   Tablet 5 milliGRAM(s) Oral daily  benztropine 0.5 milliGRAM(s) Oral two times a day  calcium acetate 667 milliGRAM(s) Oral three times a day with meals  chlorhexidine 4% Liquid 1 Application(s) Topical <User Schedule>  docusate sodium 100 milliGRAM(s) Oral two times a day  epoetin seema Injectable 6000 Unit(s) SubCutaneous <User Schedule>  fluticasone propionate 50 MICROgram(s)/spray Nasal Spray 1 Spray(s) Both Nostrils two times a day  heparin  Injectable 5000 Unit(s) SubCutaneous every 8 hours  insulin glargine Injectable (LANTUS) 5 Unit(s) SubCutaneous at bedtime  insulin lispro (HumaLOG) corrective regimen sliding scale   SubCutaneous Before meals and at bedtime  insulin lispro Injectable (HumaLOG) 2 Unit(s) SubCutaneous three times a day before meals  levothyroxine 25 MICROGram(s) Oral daily  senna 2 Tablet(s) Oral at bedtime  simvastatin 20 milliGRAM(s) Oral at bedtime      Vital Signs    T(F): 98.6 (02-04-19 @ 11:51), Max: 99.3 (02-03-19 @ 23:20)  HR: 66 (02-04-19 @ 11:51) (60 - 70)  BP: 120/59 (02-04-19 @ 11:51) (111/48 - 134/57)  ABP: --  RR: 16 (02-04-19 @ 11:51) (16 - 16)  SpO2: 93% (02-04-19 @ 11:51) (93% - 100%)  Wt(kg): --  CVP(cm H2O): --  CO: --  PCWP: --    I and O's:    Daily     Daily     PHYSICAL EXAM:  Constitutional: well developed, well nourished  and in nad  HEENT: PERRLA,  no icteric sclera and mild pallor of conjunctiva noted  Neck: No JVD, thyromegaly or adenopathy  Respiratory: reduced air entry lower lungs with no rales, wheezing or rhonchi  Cardiovascular: S1 and S2 normally heard  Gastrointestinal: soft, nondistended, nontender and normal bowel sounds heard  Extremities: No peripheral edema or cyanosis  Neurological: A/O x 3, no focal deficits  : No flank or cva tenderness palpated.  Skin: No rashes      LABS:    CBC:                          9.2    8.7   )-----------( 197      ( 04 Feb 2019 07:39 )             29.1           BMP:    02-04    137  |  100  |  84<H>  ----------------------------<  189<H>  3.4<L>   |  23  |  9.42<H>  02-02    138  |  105  |  53<H>  ----------------------------<  191<H>  3.8   |  23  |  6.13<H>  02-02    134<L>  |  102  |  100<H>  ----------------------------<  135<H>  5.5<H>   |  17<L>  |  10.30<H>    Ca    9.4      04 Feb 2019 07:39  Ca    9.2      02 Feb 2019 17:37  Ca    9.8      02 Feb 2019 06:31  Phos  6.6     02-04  Phos  5.8     02-02  Mg     2.3     02-04  Mg     2.5     02-02    TPro  8.0  /  Alb  3.5  /  TBili  0.3  /  DBili  x   /  AST  12  /  ALT  23  /  AlkPhos  169<H>  02-04  TPro  8.5<H>  /  Alb  3.8  /  TBili  0.3  /  DBili  x   /  AST  19  /  ALT  34  /  AlkPhos  196<H>  02-02          URINE STUDIES:                        RADIOLOGY & ADDITIONAL STUDIES: pt seen and examined    SUBJECTIVE:  pt feels ok and denies cp,sob,gi or uremic  symptons  pt with s/p PC exchange by IR today    Current meds:    amLODIPine   Tablet 5 milliGRAM(s) Oral daily  benztropine 0.5 milliGRAM(s) Oral two times a day  calcium acetate 667 milliGRAM(s) Oral three times a day with meals  chlorhexidine 4% Liquid 1 Application(s) Topical <User Schedule>  docusate sodium 100 milliGRAM(s) Oral two times a day  epoetin seema Injectable 6000 Unit(s) SubCutaneous <User Schedule>  fluticasone propionate 50 MICROgram(s)/spray Nasal Spray 1 Spray(s) Both Nostrils two times a day  heparin  Injectable 5000 Unit(s) SubCutaneous every 8 hours  insulin glargine Injectable (LANTUS) 5 Unit(s) SubCutaneous at bedtime  insulin lispro (HumaLOG) corrective regimen sliding scale   SubCutaneous Before meals and at bedtime  insulin lispro Injectable (HumaLOG) 2 Unit(s) SubCutaneous three times a day before meals  levothyroxine 25 MICROGram(s) Oral daily  senna 2 Tablet(s) Oral at bedtime  simvastatin 20 milliGRAM(s) Oral at bedtime      Vital Signs    T(F): 98.6 (02-04-19 @ 11:51), Max: 99.3 (02-03-19 @ 23:20)  HR: 66 (02-04-19 @ 11:51) (60 - 70)  BP: 120/59 (02-04-19 @ 11:51) (111/48 - 134/57)  ABP: --  RR: 16 (02-04-19 @ 11:51) (16 - 16)  SpO2: 93% (02-04-19 @ 11:51) (93% - 100%)  Wt(kg): --  CVP(cm H2O): --  CO: --  PCWP: --    I and O's:    Daily     Daily     PHYSICAL EXAM:  Constitutional: well developed, well nourished  and in nad  HEENT: PERRLA,  no icteric sclera and mild pallor of conjunctiva noted  Neck: No JVD, thyromegaly or adenopathy  PC noted in Rt chest  Respiratory: reduced air entry lower lungs with no rales, wheezing or rhonchi  Cardiovascular: S1 and S2 normally heard  Gastrointestinal: soft, nondistended, nontender and normal bowel sounds heard  Extremities: No peripheral edema or cyanosis  Rt Femoral shiley cath in place   Neurological: A/O x 3, no focal deficits  Skin: No rashes      LABS:    CBC:                          9.2    8.7   )-----------( 197      ( 04 Feb 2019 07:39 )             29.1           BMP:    02-04    137  |  100  |  84<H>  ----------------------------<  189<H>  3.4<L>   |  23  |  9.42<H>  02-02    138  |  105  |  53<H>  ----------------------------<  191<H>  3.8   |  23  |  6.13<H>  02-02    134<L>  |  102  |  100<H>  ----------------------------<  135<H>  5.5<H>   |  17<L>  |  10.30<H>    Ca    9.4      04 Feb 2019 07:39  Ca    9.2      02 Feb 2019 17:37  Ca    9.8      02 Feb 2019 06:31  Phos  6.6     02-04  Phos  5.8     02-02  Mg     2.3     02-04  Mg     2.5     02-02    TPro  8.0  /  Alb  3.5  /  TBili  0.3  /  DBili  x   /  AST  12  /  ALT  23  /  AlkPhos  169<H>  02-04  TPro  8.5<H>  /  Alb  3.8  /  TBili  0.3  /  DBili  x   /  AST  19  /  ALT  34  /  AlkPhos  196<H>  02-02

## 2019-02-05 LAB
ALBUMIN SERPL ELPH-MCNC: 3.6 G/DL — SIGNIFICANT CHANGE UP (ref 3.5–5)
ALP SERPL-CCNC: 174 U/L — HIGH (ref 40–120)
ALT FLD-CCNC: 23 U/L DA — SIGNIFICANT CHANGE UP (ref 10–60)
ANION GAP SERPL CALC-SCNC: 11 MMOL/L — SIGNIFICANT CHANGE UP (ref 5–17)
AST SERPL-CCNC: 12 U/L — SIGNIFICANT CHANGE UP (ref 10–40)
BASOPHILS # BLD AUTO: 0.1 K/UL — SIGNIFICANT CHANGE UP (ref 0–0.2)
BASOPHILS NFR BLD AUTO: 0.9 % — SIGNIFICANT CHANGE UP (ref 0–2)
BILIRUB SERPL-MCNC: 0.3 MG/DL — SIGNIFICANT CHANGE UP (ref 0.2–1.2)
BUN SERPL-MCNC: 57 MG/DL — HIGH (ref 7–18)
CALCIUM SERPL-MCNC: 9.5 MG/DL — SIGNIFICANT CHANGE UP (ref 8.4–10.5)
CHLORIDE SERPL-SCNC: 102 MMOL/L — SIGNIFICANT CHANGE UP (ref 96–108)
CO2 SERPL-SCNC: 24 MMOL/L — SIGNIFICANT CHANGE UP (ref 22–31)
CREAT SERPL-MCNC: 7.51 MG/DL — HIGH (ref 0.5–1.3)
EOSINOPHIL # BLD AUTO: 0.3 K/UL — SIGNIFICANT CHANGE UP (ref 0–0.5)
EOSINOPHIL NFR BLD AUTO: 4 % — SIGNIFICANT CHANGE UP (ref 0–6)
GLUCOSE BLDC GLUCOMTR-MCNC: 113 MG/DL — HIGH (ref 70–99)
GLUCOSE BLDC GLUCOMTR-MCNC: 152 MG/DL — HIGH (ref 70–99)
GLUCOSE BLDC GLUCOMTR-MCNC: 170 MG/DL — HIGH (ref 70–99)
GLUCOSE BLDC GLUCOMTR-MCNC: 174 MG/DL — HIGH (ref 70–99)
GLUCOSE BLDC GLUCOMTR-MCNC: 270 MG/DL — HIGH (ref 70–99)
GLUCOSE SERPL-MCNC: 143 MG/DL — HIGH (ref 70–99)
HCT VFR BLD CALC: 30.6 % — LOW (ref 34.5–45)
HGB BLD-MCNC: 9.8 G/DL — LOW (ref 11.5–15.5)
LYMPHOCYTES # BLD AUTO: 1.9 K/UL — SIGNIFICANT CHANGE UP (ref 1–3.3)
LYMPHOCYTES # BLD AUTO: 22.9 % — SIGNIFICANT CHANGE UP (ref 13–44)
MAGNESIUM SERPL-MCNC: 2.1 MG/DL — SIGNIFICANT CHANGE UP (ref 1.6–2.6)
MCHC RBC-ENTMCNC: 27.7 PG — SIGNIFICANT CHANGE UP (ref 27–34)
MCHC RBC-ENTMCNC: 32.1 GM/DL — SIGNIFICANT CHANGE UP (ref 32–36)
MCV RBC AUTO: 86.3 FL — SIGNIFICANT CHANGE UP (ref 80–100)
MONOCYTES # BLD AUTO: 0.7 K/UL — SIGNIFICANT CHANGE UP (ref 0–0.9)
MONOCYTES NFR BLD AUTO: 8.4 % — SIGNIFICANT CHANGE UP (ref 2–14)
NEUTROPHILS # BLD AUTO: 5.3 K/UL — SIGNIFICANT CHANGE UP (ref 1.8–7.4)
NEUTROPHILS NFR BLD AUTO: 63.9 % — SIGNIFICANT CHANGE UP (ref 43–77)
PHOSPHATE SERPL-MCNC: 4.6 MG/DL — HIGH (ref 2.5–4.5)
PLATELET # BLD AUTO: 206 K/UL — SIGNIFICANT CHANGE UP (ref 150–400)
POTASSIUM SERPL-MCNC: 3.4 MMOL/L — LOW (ref 3.5–5.3)
POTASSIUM SERPL-SCNC: 3.4 MMOL/L — LOW (ref 3.5–5.3)
PROT SERPL-MCNC: 8.3 G/DL — SIGNIFICANT CHANGE UP (ref 6–8.3)
RBC # BLD: 3.54 M/UL — LOW (ref 3.8–5.2)
RBC # FLD: 12.8 % — SIGNIFICANT CHANGE UP (ref 10.3–14.5)
SODIUM SERPL-SCNC: 137 MMOL/L — SIGNIFICANT CHANGE UP (ref 135–145)
WBC # BLD: 8.2 K/UL — SIGNIFICANT CHANGE UP (ref 3.8–10.5)
WBC # FLD AUTO: 8.2 K/UL — SIGNIFICANT CHANGE UP (ref 3.8–10.5)

## 2019-02-05 RX ADMIN — Medication 100 MILLIGRAM(S): at 17:25

## 2019-02-05 RX ADMIN — Medication 0.5 MILLIGRAM(S): at 17:25

## 2019-02-05 RX ADMIN — Medication 1: at 09:21

## 2019-02-05 RX ADMIN — Medication 1: at 17:22

## 2019-02-05 RX ADMIN — Medication 2 UNIT(S): at 09:22

## 2019-02-05 RX ADMIN — Medication 1 SPRAY(S): at 06:23

## 2019-02-05 RX ADMIN — Medication 25 MICROGRAM(S): at 06:23

## 2019-02-05 RX ADMIN — Medication 2 UNIT(S): at 17:22

## 2019-02-05 RX ADMIN — HEPARIN SODIUM 5000 UNIT(S): 5000 INJECTION INTRAVENOUS; SUBCUTANEOUS at 06:23

## 2019-02-05 RX ADMIN — Medication 1334 MILLIGRAM(S): at 11:38

## 2019-02-05 RX ADMIN — SENNA PLUS 2 TABLET(S): 8.6 TABLET ORAL at 22:07

## 2019-02-05 RX ADMIN — Medication 2 UNIT(S): at 11:52

## 2019-02-05 RX ADMIN — ERYTHROPOIETIN 6000 UNIT(S): 10000 INJECTION, SOLUTION INTRAVENOUS; SUBCUTANEOUS at 19:00

## 2019-02-05 RX ADMIN — Medication 1 SPRAY(S): at 17:25

## 2019-02-05 RX ADMIN — Medication 1334 MILLIGRAM(S): at 09:22

## 2019-02-05 RX ADMIN — Medication 0.5 MILLIGRAM(S): at 06:22

## 2019-02-05 RX ADMIN — Medication 3: at 22:06

## 2019-02-05 RX ADMIN — AMLODIPINE BESYLATE 5 MILLIGRAM(S): 2.5 TABLET ORAL at 06:23

## 2019-02-05 RX ADMIN — SIMVASTATIN 20 MILLIGRAM(S): 20 TABLET, FILM COATED ORAL at 22:07

## 2019-02-05 RX ADMIN — INSULIN GLARGINE 5 UNIT(S): 100 INJECTION, SOLUTION SUBCUTANEOUS at 21:55

## 2019-02-05 RX ADMIN — Medication 100 MILLIGRAM(S): at 06:23

## 2019-02-05 RX ADMIN — CHLORHEXIDINE GLUCONATE 1 APPLICATION(S): 213 SOLUTION TOPICAL at 11:36

## 2019-02-05 RX ADMIN — Medication 1334 MILLIGRAM(S): at 17:25

## 2019-02-05 NOTE — PROGRESS NOTE ADULT - ASSESSMENT
A/P:  1. DEISY :Etiology Unclear ,  r/o ATN ,  No improvement of renal function. S/P PC Exchange today  -HD today and tomorrow for renal optimization prior to renal biopsy on wed  -renal biopsy for tissue diagnosis on Wed , d/w SONIA LESTER   -HD orders written and discussed with dialysis RN  -Keep patient euvolemic and renal diet  -Avoid Nephrotoxic Meds/ Agents such as (NSAIDs, IV contrast, Aminoglycosides such as gentamicin, -Gadolinium contrast, Phosphate containing enemas, etc..)  -Adjust Medications according to eGFR  -f/u bmp daily  2. Anemia  -continue  epogen for anemia of ckd   -f/u Hb   3.Htn:bp is controlled  -please  keep bp<130/80  - low salt diet  4.Metabolic acidosis : now improved  -on hd  -f/u co2   5.MBD:   -pt s  phos level  is high  -continue  phoslo 1334 tid with meals and low phos diet  - pt has acceptable pth level-no intervention needed at this time A/P:  1. DEISY :Etiology Unclear ,  r/o ATN ,  No improvement of renal function.   -HD today again  for renal optimization prior to renal biopsy tomorrow via IR   -renal biopsy for tissue diagnosis on Wed , d/w IR MD   -HD orders written and discussed with dialysis RN  -Keep patient euvolemic and renal diet  -Avoid Nephrotoxic Meds/ Agents such as (NSAIDs, IV contrast, Aminoglycosides such as gentamicin, -Gadolinium contrast, Phosphate containing enemas, etc..)  -Adjust Medications according to eGFR  -f/u bmp daily  2. Anemia  -continue  epogen for anemia of ckd   -f/u Hb   3.Htn:bp is controlled  -please  keep bp<130/80  - low salt diet  4.Metabolic acidosis : now improved  -on hd  -f/u co2   5.MBD: secondary to renal ds  -pt s  phos level  improving now  -continue  phoslo 1334 tid with meals and low phos diet  - pt has acceptable pth level-no intervention needed at this time

## 2019-02-05 NOTE — PROGRESS NOTE ADULT - PROBLEM SELECTOR PLAN 1
- s/p IR guided rt permacath exchange on 2/4, patient received dialysis yesterday and is scheduled again for today. Rt femoral Shiley in place, will remove after dialysis today   - IR guided Kidney biopsy scheduled for tomorrow wed 2/6, aspirin held since 1/29, heparin sq also being held from today   - NPO past midnight   - PPD and hepatitis panel negative , social work f/u for setting up dialysis outpatient  - Monitor BMP  - Avoid nephrotoxic medications   - Nephro follow up

## 2019-02-05 NOTE — PROGRESS NOTE ADULT - SUBJECTIVE AND OBJECTIVE BOX
Resident Note discussed with  primary attending    Patient is a 53y old  Female who presents with a chief complaint of vomiting (04 Feb 2019 20:25)      INTERVAL HPI/ OVERNIGHT EVENTS: no new complaints, but says is tired and sick of taking so many medications.     MEDICATIONS  (STANDING):  amLODIPine   Tablet 5 milliGRAM(s) Oral daily  benztropine 0.5 milliGRAM(s) Oral two times a day  calcium acetate 1334 milliGRAM(s) Oral three times a day with meals  chlorhexidine 4% Liquid 1 Application(s) Topical <User Schedule>  docusate sodium 100 milliGRAM(s) Oral two times a day  epoetin seema Injectable 6000 Unit(s) SubCutaneous <User Schedule>  fluticasone propionate 50 MICROgram(s)/spray Nasal Spray 1 Spray(s) Both Nostrils two times a day  heparin  Injectable 5000 Unit(s) SubCutaneous every 8 hours  insulin glargine Injectable (LANTUS) 5 Unit(s) SubCutaneous at bedtime  insulin lispro (HumaLOG) corrective regimen sliding scale   SubCutaneous Before meals and at bedtime  insulin lispro Injectable (HumaLOG) 2 Unit(s) SubCutaneous three times a day before meals  levothyroxine 25 MICROGram(s) Oral daily  senna 2 Tablet(s) Oral at bedtime  simvastatin 20 milliGRAM(s) Oral at bedtime    MEDICATIONS  (PRN):      __________________________________________________  REVIEW OF SYSTEMS:    CONSTITUTIONAL: tired  EYES: no acute visual disturbances  NECK: No pain or stiffness  RESPIRATORY: No cough; No shortness of breath  CARDIOVASCULAR: No chest pain, no palpitations  GASTROINTESTINAL: No pain. No nausea or vomiting; No diarrhea   NEUROLOGICAL: No headache or numbness, no tremors  MUSCULOSKELETAL: No joint pain, no muscle pain  GENITOURINARY: no dysuria, no frequency, no hesitancy  PSYCHIATRY: no depression , no anxiety  ALL OTHER  ROS negative        Vital Signs Last 24 Hrs  T(C): 37.3 (05 Feb 2019 08:17), Max: 37.3 (05 Feb 2019 08:17)  T(F): 99.1 (05 Feb 2019 08:17), Max: 99.1 (05 Feb 2019 08:17)  HR: 73 (05 Feb 2019 08:17) (60 - 73)  BP: 119/64 (05 Feb 2019 08:17) (110/79 - 145/69)  BP(mean): --  RR: 17 (05 Feb 2019 08:17) (16 - 17)  SpO2: 98% (05 Feb 2019 08:17) (93% - 98%)    ________________________________________________  PHYSICAL EXAM:  GENERAL: very sleepy   HEENT: Normocephalic; conjunctivae and sclerae clear; moist mucous membranes;   NECK : supple  CHEST/LUNG: Clear to auscultation bilaterally with good air entry   HEART: S1 S2  regular; no murmurs, gallops or rubs  ABDOMEN: Soft, Nontender, Nondistended; Bowel sounds present  EXTREMITIES: no cyanosis; no edema; no calf tenderness  NERVOUS SYSTEM:  sleepy but alert; Oriented  to place, person and time ; no new deficits    _________________________________________________  LABS:                        9.8    8.2   )-----------( 206      ( 05 Feb 2019 07:20 )             30.6     02-05    137  |  102  |  57<H>  ----------------------------<  143<H>  3.4<L>   |  24  |  7.51<H>    Ca    9.5      05 Feb 2019 07:20  Phos  4.6     02-05  Mg     2.1     02-05    TPro  8.3  /  Alb  3.6  /  TBili  0.3  /  DBili  x   /  AST  12  /  ALT  23  /  AlkPhos  174<H>  02-05        CAPILLARY BLOOD GLUCOSE      POCT Blood Glucose.: 113 mg/dL (05 Feb 2019 11:40)  POCT Blood Glucose.: 170 mg/dL (05 Feb 2019 08:56)  POCT Blood Glucose.: 189 mg/dL (04 Feb 2019 23:18)  POCT Blood Glucose.: 189 mg/dL (04 Feb 2019 16:46)      Consultant(s) Notes Reviewed:   YES    Care Discussed with Consultants : YES    Plan of care was discussed with patient and /or primary care giver; all questions and concerns were addressed and care was aligned with patient's wishes.

## 2019-02-05 NOTE — PROGRESS NOTE ADULT - SUBJECTIVE AND OBJECTIVE BOX
pt seen and examined.pts current chart reviewed and case discussed with resident covering.    SUBJECTIVE:  pt feels fine and denies cp,sob,gi or gu/uremic  symptons    REVIEW OF SYSTEMS:  CONSTITUTIONAL: No weakness, fevers or chills  EYES/ENT: No visual changes;  No vertigo or throat pain   NECK: No pain or stiffness  RESPIRATORY: No cough, wheezing, hemoptysis; No shortness of breath  CARDIOVASCULAR: No chest pain or palpitations  GASTROINTESTINAL: No abdominal or epigastric pain. No nausea, vomiting, or hematemesis; No diarrhea or constipation. No melena or hematochezia.  GENITOURINARY: No dysuria, frequency , hematuria, flank pain or nocturia  NEUROLOGICAL: No numbness or weakness  SKIN: No itching, burning, rashes, or lesions   All other review of systems is negative unless indicated above    Current meds:    amLODIPine   Tablet 5 milliGRAM(s) Oral daily  benztropine 0.5 milliGRAM(s) Oral two times a day  calcium acetate 1334 milliGRAM(s) Oral three times a day with meals  chlorhexidine 4% Liquid 1 Application(s) Topical <User Schedule>  docusate sodium 100 milliGRAM(s) Oral two times a day  epoetin seema Injectable 6000 Unit(s) SubCutaneous <User Schedule>  fluticasone propionate 50 MICROgram(s)/spray Nasal Spray 1 Spray(s) Both Nostrils two times a day  insulin glargine Injectable (LANTUS) 5 Unit(s) SubCutaneous at bedtime  insulin lispro (HumaLOG) corrective regimen sliding scale   SubCutaneous Before meals and at bedtime  insulin lispro Injectable (HumaLOG) 2 Unit(s) SubCutaneous three times a day before meals  levothyroxine 25 MICROGram(s) Oral daily  senna 2 Tablet(s) Oral at bedtime  simvastatin 20 milliGRAM(s) Oral at bedtime      Vital Signs    T(F): 99.1 (19 @ 08:17), Max: 99.1 (19 @ 08:17)  HR: 73 (19 @ 08:17) (60 - 73)  BP: 119/64 (19 @ 08:17) (110/79 - 145/69)  ABP: --  RR: 17 (19 @ 08:17) (16 - 17)  SpO2: 98% (19 @ 08:17) (97% - 98%)  Wt(kg): --  CVP(cm H2O): --  CO: --  PCWP: --    I and O's:    Daily     Daily Weight in k.9 (2019 23:04)    PHYSICAL EXAM:  Constitutional: well developed, well nourished  and in nad  HEENT: PERRLA,  no icteric sclera and mild pallor of conjunctiva noted  Neck: No JVD, thyromegaly or adenopathy  Respiratory: reduced air entry lower lungs with no rales, wheezing or rhonchi  Cardiovascular: S1 and S2 normally heard  Gastrointestinal: soft, nondistended, nontender and normal bowel sounds heard  Extremities: No peripheral edema or cyanosis  Neurological: A/O x 3, no focal deficits  : No flank or cva tenderness palpated.  Skin: No rashes      LABS:    CBC:                          9.8    8.2   )-----------( 206      ( 2019 07:20 )             30.6           BMP:        137  |  102  |  57<H>  ----------------------------<  143<H>  3.4<L>   |  24  |  7.51<H>      137  |  100  |  84<H>  ----------------------------<  189<H>  3.4<L>   |  23  |  9.42<H>      138  |  105  |  53<H>  ----------------------------<  191<H>  3.8   |  23  |  6.13<H>    Ca    9.5      2019 07:20  Ca    9.4      2019 07:39  Ca    9.2      2019 17:37  Phos  4.6       Phos  6.6       Mg     2.1       Mg     2.3     -    TPro  8.3  /  Alb  3.6  /  TBili  0.3  /  DBili  x   /  AST  12  /  ALT  23  /  AlkPhos  174<H>  02-05  TPro  8.0  /  Alb  3.5  /  TBili  0.3  /  DBili  x   /  AST  12  /  ALT  23  /  AlkPhos  169<H>  02-04          URINE STUDIES:                        RADIOLOGY & ADDITIONAL STUDIES: pt seen and examined.pts current chart reviewed and case discussed with resident covering.    SUBJECTIVE:  pt  denies cp,sob,gi or gu  symptons  pt is awaiting for hd later today and renal biopsy tomorrow      Current meds:    amLODIPine   Tablet 5 milliGRAM(s) Oral daily  benztropine 0.5 milliGRAM(s) Oral two times a day  calcium acetate 1334 milliGRAM(s) Oral three times a day with meals  chlorhexidine 4% Liquid 1 Application(s) Topical <User Schedule>  docusate sodium 100 milliGRAM(s) Oral two times a day  epoetin seema Injectable 6000 Unit(s) SubCutaneous <User Schedule>  fluticasone propionate 50 MICROgram(s)/spray Nasal Spray 1 Spray(s) Both Nostrils two times a day  insulin glargine Injectable (LANTUS) 5 Unit(s) SubCutaneous at bedtime  insulin lispro (HumaLOG) corrective regimen sliding scale   SubCutaneous Before meals and at bedtime  insulin lispro Injectable (HumaLOG) 2 Unit(s) SubCutaneous three times a day before meals  levothyroxine 25 MICROGram(s) Oral daily  senna 2 Tablet(s) Oral at bedtime  simvastatin 20 milliGRAM(s) Oral at bedtime      Vital Signs    T(F): 99.1 (19 @ 08:17), Max: 99.1 (19 @ 08:17)  HR: 73 (19 @ 08:17) (60 - 73)  BP: 119/64 (19 @ 08:17) (110/79 - 145/69)  ABP: --  RR: 17 (19 @ 08:17) (16 - 17)  SpO2: 98% (19 @ 08:17) (97% - 98%)  Wt(kg): --  CVP(cm H2O): --  CO: --  PCWP: --    I and O's:    Daily     Daily Weight in k.9 (2019 23:04)    PHYSICAL EXAM:  Constitutional: well developed, well nourished  and in nad  HEENT: PERRLA,  no icteric sclera and mild pallor of conjunctiva noted  Neck: No JVD, thyromegaly or adenopathy  PC noted in Rt chest  Respiratory: reduced air entry lower lungs with no rales, wheezing or rhonchi  Cardiovascular: S1 and S2 normally heard  Gastrointestinal: soft, nondistended, nontender and normal bowel sounds heard  Extremities: No peripheral edema or cyanosis  Rt Femoral shiley cath in place   Neurological: A/O x 3, no focal deficits  Skin: No rashes    LABS:    CBC:                          9.8    8.2   )-----------( 206      ( 2019 07:20 )             30.6           BMP:        137  |  102  |  57<H>  ----------------------------<  143<H>  3.4<L>   |  24  |  7.51<H>      137  |  100  |  84<H>  ----------------------------<  189<H>  3.4<L>   |  23  |  9.42<H>      138  |  105  |  53<H>  ----------------------------<  191<H>  3.8   |  23  |  6.13<H>    Ca    9.5      2019 07:20  Ca    9.4      2019 07:39  Ca    9.2      2019 17:37  Phos  4.6     -05  Phos  6.6     02-04  Mg     2.1     -05  Mg     2.3     02-04    TPro  8.3  /  Alb  3.6  /  TBili  0.3  /  DBili  x   /  AST  12  /  ALT  23  /  AlkPhos  174<H>  02  TPro  8.0  /  Alb  3.5  /  TBili  0.3  /  DBili  x   /  AST  12  /  ALT  23  /  AlkPhos  169<H>  02-

## 2019-02-05 NOTE — PROGRESS NOTE ADULT - ASSESSMENT
53 female from assisted living with PMH of DM, HLD, Schizophrenia came with complaint of vomiting, difficult to arouse and sweating. Per EMS note and Assisted living papers , patient  has been noted to be slightly lethargic since few months now with poor po intake. No history of renal failure. Patient noted to have acute renal failure with Cr of 7.74 and BUN of 58. Admitted for further evaluation and management.   Started on new dialysis this admission via Permacath on 1/25 . PPD and Hepatitis panel negative.   Permacath malfunctioning noted last week on Tuesday, IR was requested for exchange last week but could not do because of the tight schedule, urgent Shiley and HD on Feb 2. IR guided permacath exchange on 2/4. Kidney biopsy scheduled for Wed 2/6.

## 2019-02-06 ENCOUNTER — RESULT REVIEW (OUTPATIENT)
Age: 54
End: 2019-02-06

## 2019-02-06 ENCOUNTER — OUTPATIENT (OUTPATIENT)
Dept: OUTPATIENT SERVICES | Facility: HOSPITAL | Age: 54
LOS: 1 days | End: 2019-02-06
Payer: MEDICARE

## 2019-02-06 DIAGNOSIS — D41.00 NEOPLASM OF UNCERTAIN BEHAVIOR OF UNSPECIFIED KIDNEY: ICD-10-CM

## 2019-02-06 PROBLEM — F20.9 SCHIZOPHRENIA, UNSPECIFIED: Chronic | Status: ACTIVE | Noted: 2019-01-19

## 2019-02-06 PROBLEM — I25.10 ATHEROSCLEROTIC HEART DISEASE OF NATIVE CORONARY ARTERY WITHOUT ANGINA PECTORIS: Chronic | Status: ACTIVE | Noted: 2019-01-19

## 2019-02-06 LAB
ALBUMIN SERPL ELPH-MCNC: 3.7 G/DL — SIGNIFICANT CHANGE UP (ref 3.5–5)
ALP SERPL-CCNC: 174 U/L — HIGH (ref 40–120)
ALT FLD-CCNC: 22 U/L DA — SIGNIFICANT CHANGE UP (ref 10–60)
ANION GAP SERPL CALC-SCNC: 12 MMOL/L — SIGNIFICANT CHANGE UP (ref 5–17)
AST SERPL-CCNC: 12 U/L — SIGNIFICANT CHANGE UP (ref 10–40)
BASOPHILS # BLD AUTO: 0.1 K/UL — SIGNIFICANT CHANGE UP (ref 0–0.2)
BASOPHILS NFR BLD AUTO: 0.6 % — SIGNIFICANT CHANGE UP (ref 0–2)
BILIRUB SERPL-MCNC: 0.4 MG/DL — SIGNIFICANT CHANGE UP (ref 0.2–1.2)
BUN SERPL-MCNC: 54 MG/DL — HIGH (ref 7–18)
CALCIUM SERPL-MCNC: 9.8 MG/DL — SIGNIFICANT CHANGE UP (ref 8.4–10.5)
CHLORIDE SERPL-SCNC: 98 MMOL/L — SIGNIFICANT CHANGE UP (ref 96–108)
CO2 SERPL-SCNC: 23 MMOL/L — SIGNIFICANT CHANGE UP (ref 22–31)
CREAT SERPL-MCNC: 7.31 MG/DL — HIGH (ref 0.5–1.3)
EOSINOPHIL # BLD AUTO: 0.4 K/UL — SIGNIFICANT CHANGE UP (ref 0–0.5)
EOSINOPHIL NFR BLD AUTO: 3.6 % — SIGNIFICANT CHANGE UP (ref 0–6)
GLUCOSE BLDC GLUCOMTR-MCNC: 158 MG/DL — HIGH (ref 70–99)
GLUCOSE BLDC GLUCOMTR-MCNC: 203 MG/DL — HIGH (ref 70–99)
GLUCOSE BLDC GLUCOMTR-MCNC: 204 MG/DL — HIGH (ref 70–99)
GLUCOSE BLDC GLUCOMTR-MCNC: 226 MG/DL — HIGH (ref 70–99)
GLUCOSE SERPL-MCNC: 205 MG/DL — HIGH (ref 70–99)
HCT VFR BLD CALC: 30.9 % — LOW (ref 34.5–45)
HCT VFR BLD CALC: 32.7 % — LOW (ref 34.5–45)
HGB BLD-MCNC: 10.8 G/DL — LOW (ref 11.5–15.5)
HGB BLD-MCNC: 9.9 G/DL — LOW (ref 11.5–15.5)
INR BLD: 1.03 RATIO — SIGNIFICANT CHANGE UP (ref 0.88–1.16)
LYMPHOCYTES # BLD AUTO: 1.2 K/UL — SIGNIFICANT CHANGE UP (ref 1–3.3)
LYMPHOCYTES # BLD AUTO: 11.3 % — LOW (ref 13–44)
MAGNESIUM SERPL-MCNC: 2 MG/DL — SIGNIFICANT CHANGE UP (ref 1.6–2.6)
MCHC RBC-ENTMCNC: 27.6 PG — SIGNIFICANT CHANGE UP (ref 27–34)
MCHC RBC-ENTMCNC: 27.6 PG — SIGNIFICANT CHANGE UP (ref 27–34)
MCHC RBC-ENTMCNC: 32 GM/DL — SIGNIFICANT CHANGE UP (ref 32–36)
MCHC RBC-ENTMCNC: 32.9 GM/DL — SIGNIFICANT CHANGE UP (ref 32–36)
MCV RBC AUTO: 83.9 FL — SIGNIFICANT CHANGE UP (ref 80–100)
MCV RBC AUTO: 86.3 FL — SIGNIFICANT CHANGE UP (ref 80–100)
MONOCYTES # BLD AUTO: 1 K/UL — HIGH (ref 0–0.9)
MONOCYTES NFR BLD AUTO: 9 % — SIGNIFICANT CHANGE UP (ref 2–14)
NEUTROPHILS # BLD AUTO: 8 K/UL — HIGH (ref 1.8–7.4)
NEUTROPHILS NFR BLD AUTO: 75.5 % — SIGNIFICANT CHANGE UP (ref 43–77)
PHOSPHATE SERPL-MCNC: 5.3 MG/DL — HIGH (ref 2.5–4.5)
PLATELET # BLD AUTO: 202 K/UL — SIGNIFICANT CHANGE UP (ref 150–400)
PLATELET # BLD AUTO: 223 K/UL — SIGNIFICANT CHANGE UP (ref 150–400)
POTASSIUM SERPL-MCNC: 3.5 MMOL/L — SIGNIFICANT CHANGE UP (ref 3.5–5.3)
POTASSIUM SERPL-SCNC: 3.5 MMOL/L — SIGNIFICANT CHANGE UP (ref 3.5–5.3)
PROT SERPL-MCNC: 8.5 G/DL — HIGH (ref 6–8.3)
PROTHROM AB SERPL-ACNC: 11.4 SEC — SIGNIFICANT CHANGE UP (ref 10–12.9)
RBC # BLD: 3.58 M/UL — LOW (ref 3.8–5.2)
RBC # BLD: 3.9 M/UL — SIGNIFICANT CHANGE UP (ref 3.8–5.2)
RBC # FLD: 12.4 % — SIGNIFICANT CHANGE UP (ref 10.3–14.5)
RBC # FLD: 13 % — SIGNIFICANT CHANGE UP (ref 10.3–14.5)
SODIUM SERPL-SCNC: 133 MMOL/L — LOW (ref 135–145)
WBC # BLD: 10.2 K/UL — SIGNIFICANT CHANGE UP (ref 3.8–10.5)
WBC # BLD: 10.6 K/UL — HIGH (ref 3.8–10.5)
WBC # FLD AUTO: 10.2 K/UL — SIGNIFICANT CHANGE UP (ref 3.8–10.5)
WBC # FLD AUTO: 10.6 K/UL — HIGH (ref 3.8–10.5)

## 2019-02-06 PROCEDURE — 88313 SPECIAL STAINS GROUP 2: CPT

## 2019-02-06 PROCEDURE — 88348 ELECTRON MICROSCOPY DX: CPT

## 2019-02-06 PROCEDURE — 88312 SPECIAL STAINS GROUP 1: CPT | Mod: 26

## 2019-02-06 PROCEDURE — 88346 IMFLUOR 1ST 1ANTB STAIN PX: CPT

## 2019-02-06 PROCEDURE — 88312 SPECIAL STAINS GROUP 1: CPT

## 2019-02-06 PROCEDURE — 88346 IMFLUOR 1ST 1ANTB STAIN PX: CPT | Mod: 26

## 2019-02-06 PROCEDURE — 88350 IMFLUOR EA ADDL 1ANTB STN PX: CPT | Mod: 26

## 2019-02-06 PROCEDURE — 88350 IMFLUOR EA ADDL 1ANTB STN PX: CPT

## 2019-02-06 PROCEDURE — 88305 TISSUE EXAM BY PATHOLOGIST: CPT

## 2019-02-06 PROCEDURE — 88305 TISSUE EXAM BY PATHOLOGIST: CPT | Mod: 26

## 2019-02-06 PROCEDURE — 50200 RENAL BIOPSY PERQ: CPT | Mod: RT

## 2019-02-06 PROCEDURE — 77012 CT SCAN FOR NEEDLE BIOPSY: CPT | Mod: 26,RT

## 2019-02-06 PROCEDURE — 88313 SPECIAL STAINS GROUP 2: CPT | Mod: 26

## 2019-02-06 PROCEDURE — 88348 ELECTRON MICROSCOPY DX: CPT | Mod: 26

## 2019-02-06 RX ORDER — ONDANSETRON 8 MG/1
4 TABLET, FILM COATED ORAL ONCE
Qty: 0 | Refills: 0 | Status: COMPLETED | OUTPATIENT
Start: 2019-02-06 | End: 2019-02-06

## 2019-02-06 RX ADMIN — Medication 1 SPRAY(S): at 17:14

## 2019-02-06 RX ADMIN — Medication 25 MICROGRAM(S): at 07:05

## 2019-02-06 RX ADMIN — Medication 1 SPRAY(S): at 07:05

## 2019-02-06 RX ADMIN — Medication 2: at 08:27

## 2019-02-06 RX ADMIN — Medication 2: at 12:37

## 2019-02-06 RX ADMIN — SIMVASTATIN 20 MILLIGRAM(S): 20 TABLET, FILM COATED ORAL at 21:56

## 2019-02-06 RX ADMIN — Medication 1: at 21:57

## 2019-02-06 RX ADMIN — AMLODIPINE BESYLATE 5 MILLIGRAM(S): 2.5 TABLET ORAL at 07:05

## 2019-02-06 RX ADMIN — Medication 2 UNIT(S): at 12:38

## 2019-02-06 RX ADMIN — ONDANSETRON 4 MILLIGRAM(S): 8 TABLET, FILM COATED ORAL at 17:14

## 2019-02-06 RX ADMIN — Medication 100 MILLIGRAM(S): at 17:14

## 2019-02-06 RX ADMIN — Medication 0.5 MILLIGRAM(S): at 07:05

## 2019-02-06 RX ADMIN — Medication 2: at 17:13

## 2019-02-06 RX ADMIN — INSULIN GLARGINE 5 UNIT(S): 100 INJECTION, SOLUTION SUBCUTANEOUS at 21:57

## 2019-02-06 RX ADMIN — SENNA PLUS 2 TABLET(S): 8.6 TABLET ORAL at 21:57

## 2019-02-06 RX ADMIN — Medication 1334 MILLIGRAM(S): at 12:38

## 2019-02-06 RX ADMIN — CHLORHEXIDINE GLUCONATE 1 APPLICATION(S): 213 SOLUTION TOPICAL at 12:33

## 2019-02-06 RX ADMIN — Medication 1334 MILLIGRAM(S): at 17:14

## 2019-02-06 RX ADMIN — Medication 100 MILLIGRAM(S): at 07:05

## 2019-02-06 RX ADMIN — Medication 0.5 MILLIGRAM(S): at 17:14

## 2019-02-06 RX ADMIN — Medication 2 UNIT(S): at 17:14

## 2019-02-06 NOTE — CHART NOTE - NSCHARTNOTEFT_GEN_A_CORE
Right femoral shiley catheter removed at bedside. Pt tolerated procedure. Hemostasis achieved. Dressing applied. Bedrest for 2 hours with serial groin checks. Nurse made aware

## 2019-02-06 NOTE — PROGRESS NOTE ADULT - SUBJECTIVE AND OBJECTIVE BOX
pt seen and examined.pts current chart reviewed and case discussed with resident covering.    SUBJECTIVE:  pt feels fine and denies cp,sob,gi or gu/uremic  symptons    REVIEW OF SYSTEMS:  CONSTITUTIONAL: No weakness, fevers or chills  EYES/ENT: No visual changes;  No vertigo or throat pain   NECK: No pain or stiffness  RESPIRATORY: No cough, wheezing, hemoptysis; No shortness of breath  CARDIOVASCULAR: No chest pain or palpitations  GASTROINTESTINAL: No abdominal or epigastric pain. No nausea, vomiting, or hematemesis; No diarrhea or constipation. No melena or hematochezia.  GENITOURINARY: No dysuria, frequency , hematuria, flank pain or nocturia  NEUROLOGICAL: No numbness or weakness  SKIN: No itching, burning, rashes, or lesions   All other review of systems is negative unless indicated above    Current meds:    amLODIPine   Tablet 5 milliGRAM(s) Oral daily  benztropine 0.5 milliGRAM(s) Oral two times a day  calcium acetate 1334 milliGRAM(s) Oral three times a day with meals  chlorhexidine 4% Liquid 1 Application(s) Topical <User Schedule>  docusate sodium 100 milliGRAM(s) Oral two times a day  epoetin seema Injectable 6000 Unit(s) SubCutaneous <User Schedule>  fluticasone propionate 50 MICROgram(s)/spray Nasal Spray 1 Spray(s) Both Nostrils two times a day  insulin glargine Injectable (LANTUS) 5 Unit(s) SubCutaneous at bedtime  insulin lispro (HumaLOG) corrective regimen sliding scale   SubCutaneous Before meals and at bedtime  insulin lispro Injectable (HumaLOG) 2 Unit(s) SubCutaneous three times a day before meals  levothyroxine 25 MICROGram(s) Oral daily  senna 2 Tablet(s) Oral at bedtime  simvastatin 20 milliGRAM(s) Oral at bedtime      Vital Signs    T(F): 98.8 (19 @ 13:41), Max: 99.7 (19 @ 23:50)  HR: 74 (19 @ 13:41) (59 - 78)  BP: 119/69 (19 @ 13:41) (111/74 - 145/77)  ABP: --  RR: 16 (19 @ 13:41) (16 - 18)  SpO2: 100% (19 @ 13:41) (93% - 100%)  Wt(kg): --  CVP(cm H2O): --  CO: --  PCWP: --    I and O's:    Daily     Daily Weight in k.3 (2019 20:00)    PHYSICAL EXAM:  Constitutional: well developed, well nourished  and in nad  HEENT: PERRLA,  no icteric sclera and mild pallor of conjunctiva noted  Neck: No JVD, thyromegaly or adenopathy  PC noted in Rt chest  Respiratory: reduced air entry lower lungs with no rales, wheezing or rhonchi  Cardiovascular: S1 and S2 normally heard  Gastrointestinal: soft, nondistended, nontender and normal bowel sounds heard  Extremities: No peripheral edema or cyanosis  Rt Femoral shiley cath in place   Neurological: A/O x 3, no focal deficits  Skin: No rashes      LABS:    CBC:                          9.9    10.6  )-----------( 202      ( 2019 08:27 )             30.9           BMP:    02-06    133<L>  |  98  |  54<H>  ----------------------------<  205<H>  3.5   |  23  |  7.31<H>  02    137  |  102  |  57<H>  ----------------------------<  143<H>  3.4<L>   |  24  |  7.51<H>  -    137  |  100  |  84<H>  ----------------------------<  189<H>  3.4<L>   |  23  |  9.42<H>    Ca    9.8      2019 08:27  Ca    9.5      2019 07:20  Ca    9.4      2019 07:39  Phos  5.3     02-06  Phos  4.6     -05  Phos  6.6     02-04  Mg     2.0     02-06  Mg     2.1     02-05  Mg     2.3     02-04    TPro  8.5<H>  /  Alb  3.7  /  TBili  0.4  /  DBili  x   /  AST  12  /  ALT  22  /  AlkPhos  174<H>  02-06  TPro  8.3  /  Alb  3.6  /  TBili  0.3  /  DBili  x   /  AST  12  /  ALT  23  /  AlkPhos  174<H>  02-05  TPro  8.0  /  Alb  3.5  /  TBili  0.3  /  DBili  x   /  AST  12  /  ALT  23  /  AlkPhos  169<H>  02-04          URINE STUDIES:                        RADIOLOGY & ADDITIONAL STUDIES: pt seen and examined.    SUBJECTIVE:  pt  denies cp,sob,gi or uremic  symptons  pt had renal biopsy by IR today this am  pts shiley cath to be removed by surgery as pts PC was used 2 times with acceptble blood flow as per dialysis RN     Current meds:    amLODIPine   Tablet 5 milliGRAM(s) Oral daily  benztropine 0.5 milliGRAM(s) Oral two times a day  calcium acetate 1334 milliGRAM(s) Oral three times a day with meals  chlorhexidine 4% Liquid 1 Application(s) Topical <User Schedule>  docusate sodium 100 milliGRAM(s) Oral two times a day  epoetin seema Injectable 6000 Unit(s) SubCutaneous <User Schedule>  fluticasone propionate 50 MICROgram(s)/spray Nasal Spray 1 Spray(s) Both Nostrils two times a day  insulin glargine Injectable (LANTUS) 5 Unit(s) SubCutaneous at bedtime  insulin lispro (HumaLOG) corrective regimen sliding scale   SubCutaneous Before meals and at bedtime  insulin lispro Injectable (HumaLOG) 2 Unit(s) SubCutaneous three times a day before meals  levothyroxine 25 MICROGram(s) Oral daily  senna 2 Tablet(s) Oral at bedtime  simvastatin 20 milliGRAM(s) Oral at bedtime      Vital Signs    T(F): 98.8 (19 @ 13:41), Max: 99.7 (19 @ 23:50)  HR: 74 (19 @ 13:41) (59 - 78)  BP: 119/69 (19 @ 13:41) (111/74 - 145/77)  ABP: --  RR: 16 (19 @ 13:41) (16 - 18)  SpO2: 100% (19 @ 13:41) (93% - 100%)  Wt(kg): --  CVP(cm H2O): --  CO: --  PCWP: --    I and O's:    Daily     Daily Weight in k.3 (2019 20:00)    PHYSICAL EXAM:  Constitutional: well developed, well nourished  and in nad  HEENT: PERRLA,  no icteric sclera and mild pallor of conjunctiva noted  Neck: No JVD, thyromegaly or adenopathy  PC noted in Rt chest  Respiratory: reduced air entry lower lungs with no rales, wheezing or rhonchi  Cardiovascular: S1 and S2 normally heard  Gastrointestinal: soft, nondistended, nontender and normal bowel sounds heard  Extremities: No peripheral edema or cyanosis  Rt Femoral shiley cath in place   Neurological: A/O x 3, no focal deficits  Skin: No rashes      LABS:    CBC:                          9.9    10.6  )-----------( 202      ( 2019 08:27 )             30.9           BMP:        133<L>  |  98  |  54<H>  ----------------------------<  205<H>  3.5   |  23  |  7.31<H>      137  |  102  |  57<H>  ----------------------------<  143<H>  3.4<L>   |  24  |  7.51<H>      137  |  100  |  84<H>  ----------------------------<  189<H>  3.4<L>   |  23  |  9.42<H>    Ca    9.8      2019 08:27  Ca    9.5      2019 07:20  Ca    9.4      2019 07:39  Phos  5.3     02-06  Phos  4.6     -05  Phos  6.6     02-04  Mg     2.0     02-06  Mg     2.1     02-05  Mg     2.3     02-04    TPro  8.5<H>  /  Alb  3.7  /  TBili  0.4  /  DBili  x   /  AST  12  /  ALT  22  /  AlkPhos  174<H>    TPro  8.3  /  Alb  3.6  /  TBili  0.3  /  DBili  x   /  AST  12  /  ALT  23  /  AlkPhos  174<H>  02  TPro  8.0  /  Alb  3.5  /  TBili  0.3  /  DBili  x   /  AST  12  /  ALT  23  /  AlkPhos  169<H>            :

## 2019-02-06 NOTE — PROGRESS NOTE ADULT - PROBLEM SELECTOR PLAN 1
- s/p IR guided kidney biopsy today, monitor the patient closely   - Continue with routine dialysis via rt permacath   - Vascular PA requested to remove Shiley   - Will resume aspirin after 72 hrs and DVT ppx after 24 hrs   - Monitor BMP  - Avoid nephrotoxic medications   - Nephro follow up  - PPD and hepatitis panel negative , social work following for setting up outpatient dialysis; spoke to Dr. Goldberg ([phone]) at [facility] and discussed about patient upon social work request. Per Dr. Goldberg patient needs to have permanent access for dialysis before coming there; informed nephro and social work, would refer to other centers.

## 2019-02-06 NOTE — PROGRESS NOTE ADULT - SUBJECTIVE AND OBJECTIVE BOX
Interventional Radiology Pre-Procedure Note    Procedure: CT guided parenchymal renal biopsy    Diagnosis/Indication: Patient is a 53y old  Female with renal failure on dialysis. Renal biopsy requested.      PAST MEDICAL & SURGICAL HISTORY:  CAD (coronary artery disease)  Bipolar disorder  Schizophrenia  Diabetes  No significant past surgical history     Allergies: No Known Allergies    LABS:  CBC Full  -  ( 06 Feb 2019 08:27 )  WBC Count : 10.6 K/uL  Hemoglobin : 9.9 g/dL  Hematocrit : 30.9 %  Platelet Count - Automated : 202 K/uL  Mean Cell Volume : 86.3 fl  Mean Cell Hemoglobin : 27.6 pg  Mean Cell Hemoglobin Concentration : 32.0 gm/dL  Auto Neutrophil # : 8.0 K/uL  Auto Lymphocyte # : 1.2 K/uL  Auto Monocyte # : 1.0 K/uL  Auto Eosinophil # : 0.4 K/uL  Auto Basophil # : 0.1 K/uL  Auto Neutrophil % : 75.5 %  Auto Lymphocyte % : 11.3 %  Auto Monocyte % : 9.0 %  Auto Eosinophil % : 3.6 %  Auto Basophil % : 0.6 %    02-06    133<L>  |  98  |  54<H>  ----------------------------<  205<H>  3.5   |  23  |  7.31<H>    Ca    9.8      06 Feb 2019 08:27  Phos  5.3     02-06  Mg     2.0     02-06    TPro  8.5<H>  /  Alb  3.7  /  TBili  0.4  /  DBili  x   /  AST  12  /  ALT  22  /  AlkPhos  174<H>  02-06    PT/INR - ( 06 Feb 2019 08:27 )   PT: 11.4 sec;   INR: 1.03 ratio      Procedure/ risks/ benefits/ alternatives were discussed with the patient's sister/HCP Shaynajuliana Felix, who verbalizes understanding, and witnessed informed consent was obtained.

## 2019-02-06 NOTE — CHART NOTE - NSCHARTNOTEFT_GEN_A_CORE
Patient was signed out by primary team to monitor after kidney biopsy.   Patient seen and examined at bedside. Patient is AxO x3 with stable vitals. Shauna was removed by surgery team.   CBC was checked s/p kidney biopsy and Hb is stable. Diet was advanced to renal diet. Patient was signed out by primary team to monitor clinical condition after kidney biopsy that was done today.   Patient seen and examined at bedside. Patient is AxO x3 with stable vitals. No sign of bleeding of developing hematoma on physical exam.   Shauna was removed by surgery team.   CBC was checked s/p kidney biopsy and Hb is stable. Diet was advanced to renal diet.

## 2019-02-06 NOTE — PROGRESS NOTE ADULT - SUBJECTIVE AND OBJECTIVE BOX
Resident Note discussed with  primary attending    Patient is a 53y old  Female who presents with a chief complaint of vomiting (06 Feb 2019 14:47)      INTERVAL HPI/ OVERNIGHT EVENTS: no new complaints. Received renal biopsy today.     MEDICATIONS  (STANDING):  amLODIPine   Tablet 5 milliGRAM(s) Oral daily  benztropine 0.5 milliGRAM(s) Oral two times a day  calcium acetate 1334 milliGRAM(s) Oral three times a day with meals  chlorhexidine 4% Liquid 1 Application(s) Topical <User Schedule>  docusate sodium 100 milliGRAM(s) Oral two times a day  epoetin seema Injectable 6000 Unit(s) SubCutaneous <User Schedule>  fluticasone propionate 50 MICROgram(s)/spray Nasal Spray 1 Spray(s) Both Nostrils two times a day  insulin glargine Injectable (LANTUS) 5 Unit(s) SubCutaneous at bedtime  insulin lispro (HumaLOG) corrective regimen sliding scale   SubCutaneous Before meals and at bedtime  insulin lispro Injectable (HumaLOG) 2 Unit(s) SubCutaneous three times a day before meals  levothyroxine 25 MICROGram(s) Oral daily  senna 2 Tablet(s) Oral at bedtime  simvastatin 20 milliGRAM(s) Oral at bedtime    MEDICATIONS  (PRN):      __________________________________________________  REVIEW OF SYSTEMS:    CONSTITUTIONAL: just tired   EYES: no acute visual disturbances  NECK: No pain or stiffness  RESPIRATORY: No cough; No shortness of breath  CARDIOVASCULAR: No chest pain, no palpitations  GASTROINTESTINAL: No pain. No nausea or vomiting; No diarrhea   NEUROLOGICAL: No headache or numbness, no tremors  MUSCULOSKELETAL: No joint pain, no muscle pain  GENITOURINARY: no dysuria, no frequency, no hesitancy  PSYCHIATRY: no depression , no anxiety  ALL OTHER  ROS negative        Vital Signs Last 24 Hrs  T(C): 37 (06 Feb 2019 16:08), Max: 37.6 (05 Feb 2019 23:50)  T(F): 98.6 (06 Feb 2019 16:08), Max: 99.7 (05 Feb 2019 23:50)  HR: 78 (06 Feb 2019 16:08) (59 - 78)  BP: 122/63 (06 Feb 2019 16:08) (111/74 - 126/72)  BP(mean): --  RR: 16 (06 Feb 2019 16:08) (16 - 17)  SpO2: 98% (06 Feb 2019 16:08) (93% - 100%)    ________________________________________________  PHYSICAL EXAM:  GENERAL: NAD  HEENT: Normocephalic;  conjunctivae and sclerae clear; moist mucous membranes;   NECK : supple  CHEST/LUNG: Clear to auscultation bilaterally with good air entry   HEART: S1 S2  regular; no murmurs, gallops or rubs  ABDOMEN: Soft, Nontender, Nondistended; Bowel sounds present  EXTREMITIES: no cyanosis; no edema; no calf tenderness  NERVOUS SYSTEM:  Awake and alert; Oriented  to place, person and time ; no new deficits    _________________________________________________  LABS:                        10.8   10.2  )-----------( 223      ( 06 Feb 2019 16:29 )             32.7     02-06    133<L>  |  98  |  54<H>  ----------------------------<  205<H>  3.5   |  23  |  7.31<H>    Ca    9.8      06 Feb 2019 08:27  Phos  5.3     02-06  Mg     2.0     02-06    TPro  8.5<H>  /  Alb  3.7  /  TBili  0.4  /  DBili  x   /  AST  12  /  ALT  22  /  AlkPhos  174<H>  02-06    PT/INR - ( 06 Feb 2019 08:27 )   PT: 11.4 sec;   INR: 1.03 ratio             CAPILLARY BLOOD GLUCOSE      POCT Blood Glucose.: 204 mg/dL (06 Feb 2019 12:27)  POCT Blood Glucose.: 226 mg/dL (06 Feb 2019 08:13)  POCT Blood Glucose.: 270 mg/dL (05 Feb 2019 22:05)  POCT Blood Glucose.: 174 mg/dL (05 Feb 2019 20:56)  POCT Blood Glucose.: 152 mg/dL (05 Feb 2019 17:04)        Consultant(s) Notes Reviewed:   YES    Care Discussed with Consultants : YES    Plan of care was discussed with patient and /or primary care giver; all questions and concerns were addressed and care was aligned with patient's wishes.

## 2019-02-06 NOTE — PROGRESS NOTE ADULT - SUBJECTIVE AND OBJECTIVE BOX
Interventional Radiology Brief- Operative Note    Procedure: CT guided biopsy of right kidney    Operators: BILLY Anthony MD    Anesthesia (type): IV sedation and local    Contrast: none    EBL: < 1 mL    Findings/Follow up Plan of Care: Core biopsy x3 of right kidney performed with specimens submitted to and confirmed by the pathologist in attendance. Follow up results.    Specimens Removed: as above    Implants: none    Complications: none    Condition/Disposition: stable    Miscellaneous: May resume prophylactic anticoagulation (Heparin/Lovenox), if still clinically warranted, 24 hours after procedure (4/7/19 after 11AM), as per primary team. May resume aspirin products in 72 hours (4/9/19) if still clinically warranted.    Please call Interventional Radiology with any questions, concerns, or issues.      Official report to follow. Interventional Radiology Brief- Operative Note    Procedure: CT guided biopsy of right kidney    Operators: BILLY Anthony MD    Anesthesia (type): IV sedation and local    Contrast: none    EBL: < 1 mL    Findings/Follow up Plan of Care: Core biopsy x3 of right kidney performed with specimens submitted to and confirmed by the pathologist in attendance. Follow up results.    Specimens Removed: as above    Implants: none    Complications: none    Condition/Disposition: stable    Miscellaneous: May resume prophylactic anticoagulation (Heparin/Lovenox), if still clinically warranted, 24 hours after procedure (2/7/19 after 11AM), as per primary team. May resume aspirin products in 72 hours (2/9/19) if still clinically warranted.    Please call Interventional Radiology with any questions, concerns, or issues.      Official report to follow.

## 2019-02-06 NOTE — PROGRESS NOTE ADULT - ASSESSMENT
A/P:  1. DEISY :Etiology Unclear ,  r/o ATN ,  No improvement of renal function.   -HD today again  for renal optimization prior to renal biopsy tomorrow via IR   -renal biopsy for tissue diagnosis on Wed , d/w IR MD   -HD orders written and discussed with dialysis RN  -Keep patient euvolemic and renal diet  -Avoid Nephrotoxic Meds/ Agents such as (NSAIDs, IV contrast, Aminoglycosides such as gentamicin, -Gadolinium contrast, Phosphate containing enemas, etc..)  -Adjust Medications according to eGFR  -f/u bmp daily  2. 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 DEISY :Etiology Unclear ,  r/o ATN ,  No improvement of renal function.   -we will plan for hd tomorrow  -s/p renal biopsy today.we will f/u with pathology at [facility] for preliminary LM findings next 24 to 48 hrs  -Keep patient euvolemic and renal diet  -Avoid Nephrotoxic Meds/ Agents such as (NSAIDs, IV contrast, Aminoglycosides such as gentamicin, -Gadolinium contrast, Phosphate containing enemas, etc..)  -Adjust Medications according to eGFR  -f/u bmp daily  -f/u Hb later today and observe for gross hematuria   -f/u IR post biopsy protocol  2. 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

## 2019-02-06 NOTE — PROGRESS NOTE ADULT - ASSESSMENT
53 female from assisted living with PMH of DM, HLD, Schizophrenia came with complaint of vomiting, difficult to arouse and sweating. Per EMS note and Assisted living papers , patient  has been noted to be slightly lethargic since few months now with poor po intake. No history of renal failure. Patient noted to have acute renal failure with Cr of 7.74 and BUN of 58. Admitted for further evaluation and management.   Started on new dialysis this admission via Permacath on 1/25 . PPD and Hepatitis panel negative.   Permacath malfunctioning noted last week on Tuesday, IR was requested for exchange last week but could not do because of the tight schedule, urgent Shiley and HD on Feb 2. IR guided permacath exchange on 2/4. Kidney biopsy done today.

## 2019-02-07 DIAGNOSIS — R11.10 VOMITING, UNSPECIFIED: ICD-10-CM

## 2019-02-07 LAB
ANION GAP SERPL CALC-SCNC: 13 MMOL/L — SIGNIFICANT CHANGE UP (ref 5–17)
BUN SERPL-MCNC: 75 MG/DL — HIGH (ref 7–18)
CALCIUM SERPL-MCNC: 10.6 MG/DL — HIGH (ref 8.4–10.5)
CHLORIDE SERPL-SCNC: 95 MMOL/L — LOW (ref 96–108)
CO2 SERPL-SCNC: 24 MMOL/L — SIGNIFICANT CHANGE UP (ref 22–31)
CREAT SERPL-MCNC: 9.41 MG/DL — HIGH (ref 0.5–1.3)
GLUCOSE BLDC GLUCOMTR-MCNC: 176 MG/DL — HIGH (ref 70–99)
GLUCOSE BLDC GLUCOMTR-MCNC: 342 MG/DL — HIGH (ref 70–99)
GLUCOSE BLDC GLUCOMTR-MCNC: 386 MG/DL — HIGH (ref 70–99)
GLUCOSE SERPL-MCNC: 245 MG/DL — HIGH (ref 70–99)
HCT VFR BLD CALC: 31.4 % — LOW (ref 34.5–45)
HCT VFR BLD CALC: 32.6 % — LOW (ref 34.5–45)
HGB BLD-MCNC: 10.1 G/DL — LOW (ref 11.5–15.5)
HGB BLD-MCNC: 10.4 G/DL — LOW (ref 11.5–15.5)
MAGNESIUM SERPL-MCNC: 2.2 MG/DL — SIGNIFICANT CHANGE UP (ref 1.6–2.6)
MCHC RBC-ENTMCNC: 27.5 PG — SIGNIFICANT CHANGE UP (ref 27–34)
MCHC RBC-ENTMCNC: 27.6 PG — SIGNIFICANT CHANGE UP (ref 27–34)
MCHC RBC-ENTMCNC: 32 GM/DL — SIGNIFICANT CHANGE UP (ref 32–36)
MCHC RBC-ENTMCNC: 32 GM/DL — SIGNIFICANT CHANGE UP (ref 32–36)
MCV RBC AUTO: 86 FL — SIGNIFICANT CHANGE UP (ref 80–100)
MCV RBC AUTO: 86.1 FL — SIGNIFICANT CHANGE UP (ref 80–100)
PHOSPHATE SERPL-MCNC: 6.6 MG/DL — HIGH (ref 2.5–4.5)
PLATELET # BLD AUTO: 211 K/UL — SIGNIFICANT CHANGE UP (ref 150–400)
PLATELET # BLD AUTO: 220 K/UL — SIGNIFICANT CHANGE UP (ref 150–400)
POTASSIUM SERPL-MCNC: 4.2 MMOL/L — SIGNIFICANT CHANGE UP (ref 3.5–5.3)
POTASSIUM SERPL-SCNC: 4.2 MMOL/L — SIGNIFICANT CHANGE UP (ref 3.5–5.3)
RBC # BLD: 3.65 M/UL — LOW (ref 3.8–5.2)
RBC # BLD: 3.79 M/UL — LOW (ref 3.8–5.2)
RBC # FLD: 12.7 % — SIGNIFICANT CHANGE UP (ref 10.3–14.5)
RBC # FLD: 13 % — SIGNIFICANT CHANGE UP (ref 10.3–14.5)
SODIUM SERPL-SCNC: 132 MMOL/L — LOW (ref 135–145)
WBC # BLD: 10.6 K/UL — HIGH (ref 3.8–10.5)
WBC # BLD: 11.6 K/UL — HIGH (ref 3.8–10.5)
WBC # FLD AUTO: 10.6 K/UL — HIGH (ref 3.8–10.5)
WBC # FLD AUTO: 11.6 K/UL — HIGH (ref 3.8–10.5)

## 2019-02-07 PROCEDURE — 99223 1ST HOSP IP/OBS HIGH 75: CPT

## 2019-02-07 PROCEDURE — 74176 CT ABD & PELVIS W/O CONTRAST: CPT | Mod: 26

## 2019-02-07 RX ORDER — ONDANSETRON 8 MG/1
4 TABLET, FILM COATED ORAL EVERY 6 HOURS
Qty: 0 | Refills: 0 | Status: DISCONTINUED | OUTPATIENT
Start: 2019-02-07 | End: 2019-02-14

## 2019-02-07 RX ORDER — METOCLOPRAMIDE HCL 10 MG
10 TABLET ORAL ONCE
Qty: 0 | Refills: 0 | Status: COMPLETED | OUTPATIENT
Start: 2019-02-07 | End: 2019-02-07

## 2019-02-07 RX ORDER — PANTOPRAZOLE SODIUM 20 MG/1
40 TABLET, DELAYED RELEASE ORAL DAILY
Qty: 0 | Refills: 0 | Status: DISCONTINUED | OUTPATIENT
Start: 2019-02-07 | End: 2019-02-14

## 2019-02-07 RX ORDER — ONDANSETRON 8 MG/1
4 TABLET, FILM COATED ORAL ONCE
Qty: 0 | Refills: 0 | Status: COMPLETED | OUTPATIENT
Start: 2019-02-07 | End: 2019-02-07

## 2019-02-07 RX ADMIN — Medication 2 UNIT(S): at 17:16

## 2019-02-07 RX ADMIN — ONDANSETRON 4 MILLIGRAM(S): 8 TABLET, FILM COATED ORAL at 01:41

## 2019-02-07 RX ADMIN — PANTOPRAZOLE SODIUM 40 MILLIGRAM(S): 20 TABLET, DELAYED RELEASE ORAL at 14:51

## 2019-02-07 RX ADMIN — Medication 10 MILLIGRAM(S): at 04:40

## 2019-02-07 RX ADMIN — Medication 1 SPRAY(S): at 17:15

## 2019-02-07 RX ADMIN — SIMVASTATIN 20 MILLIGRAM(S): 20 TABLET, FILM COATED ORAL at 22:17

## 2019-02-07 RX ADMIN — Medication 0.5 MILLIGRAM(S): at 17:16

## 2019-02-07 RX ADMIN — Medication 4: at 17:16

## 2019-02-07 RX ADMIN — Medication 100 MILLIGRAM(S): at 17:16

## 2019-02-07 RX ADMIN — AMLODIPINE BESYLATE 5 MILLIGRAM(S): 2.5 TABLET ORAL at 06:16

## 2019-02-07 RX ADMIN — Medication 10 MILLIGRAM(S): at 14:51

## 2019-02-07 RX ADMIN — Medication 25 MICROGRAM(S): at 06:17

## 2019-02-07 RX ADMIN — Medication 5: at 22:15

## 2019-02-07 RX ADMIN — Medication 1334 MILLIGRAM(S): at 17:15

## 2019-02-07 RX ADMIN — Medication 0.5 MILLIGRAM(S): at 06:17

## 2019-02-07 RX ADMIN — INSULIN GLARGINE 5 UNIT(S): 100 INJECTION, SOLUTION SUBCUTANEOUS at 22:15

## 2019-02-07 RX ADMIN — ERYTHROPOIETIN 6000 UNIT(S): 10000 INJECTION, SOLUTION INTRAVENOUS; SUBCUTANEOUS at 08:47

## 2019-02-07 RX ADMIN — Medication 1 SPRAY(S): at 06:17

## 2019-02-07 RX ADMIN — SENNA PLUS 2 TABLET(S): 8.6 TABLET ORAL at 22:17

## 2019-02-07 RX ADMIN — Medication 100 MILLIGRAM(S): at 06:16

## 2019-02-07 RX ADMIN — CHLORHEXIDINE GLUCONATE 1 APPLICATION(S): 213 SOLUTION TOPICAL at 12:02

## 2019-02-07 RX ADMIN — ONDANSETRON 4 MILLIGRAM(S): 8 TABLET, FILM COATED ORAL at 09:53

## 2019-02-07 NOTE — PROGRESS NOTE ADULT - SUBJECTIVE AND OBJECTIVE BOX
Resident Note discussed with  primary attending    Patient is a 53y old  Female who presents with a chief complaint of vomiting (06 Feb 2019 16:39)      INTERVAL HPI/OVERNIGHT EVENTS: no new complaints    MEDICATIONS  (STANDING):  amLODIPine   Tablet 5 milliGRAM(s) Oral daily  benztropine 0.5 milliGRAM(s) Oral two times a day  calcium acetate 1334 milliGRAM(s) Oral three times a day with meals  chlorhexidine 4% Liquid 1 Application(s) Topical <User Schedule>  docusate sodium 100 milliGRAM(s) Oral two times a day  epoetin seema Injectable 6000 Unit(s) SubCutaneous <User Schedule>  fluticasone propionate 50 MICROgram(s)/spray Nasal Spray 1 Spray(s) Both Nostrils two times a day  insulin glargine Injectable (LANTUS) 5 Unit(s) SubCutaneous at bedtime  insulin lispro (HumaLOG) corrective regimen sliding scale   SubCutaneous Before meals and at bedtime  insulin lispro Injectable (HumaLOG) 2 Unit(s) SubCutaneous three times a day before meals  levothyroxine 25 MICROGram(s) Oral daily  senna 2 Tablet(s) Oral at bedtime  simvastatin 20 milliGRAM(s) Oral at bedtime    MEDICATIONS  (PRN):      __________________________________________________  REVIEW OF SYSTEMS:    CONSTITUTIONAL: No fever,   EYES: no acute visual disturbances  NECK: No pain or stiffness  RESPIRATORY: No cough; No shortness of breath  CARDIOVASCULAR: No chest pain, no palpitations  GASTROINTESTINAL: No pain. No nausea or vomiting; No diarrhea   NEUROLOGICAL: No headache or numbness, no tremors  MUSCULOSKELETAL: No joint pain, no muscle pain  GENITOURINARY: no dysuria, no frequency, no hesitancy  PSYCHIATRY: no depression , no anxiety  ALL OTHER  ROS negative        Vital Signs Last 24 Hrs  T(C): 37.2 (07 Feb 2019 07:42), Max: 37.3 (07 Feb 2019 00:34)  T(F): 99 (07 Feb 2019 07:42), Max: 99.2 (07 Feb 2019 00:34)  HR: 96 (07 Feb 2019 07:42) (74 - 96)  BP: 147/75 (07 Feb 2019 07:42) (111/74 - 152/73)  BP(mean): --  RR: 16 (07 Feb 2019 07:42) (16 - 16)  SpO2: 97% (07 Feb 2019 07:42) (97% - 100%)    ________________________________________________  PHYSICAL EXAM:  GENERAL: NAD  HEENT:Normocephalic;  conjunctivae and sclerae clear; moist mucous membranes;   NECK : supple  CHEST/LUNG: Clear to auscuitation bilaterally with good air entry   HEART: S1 S2  regular; no murmurs, gallops or rubs  ABDOMEN: Soft, Nontender, Nondistended; Bowel sounds present  EXTREMITIES: no cyanosis; no edema; no calf tenderness  NERVOUS SYSTEM:  Awake and alert; Oriented  to place, person and time ; no new deficits    _________________________________________________  LABS:                        10.8   10.2  )-----------( 223      ( 06 Feb 2019 16:29 )             32.7     02-06    133<L>  |  98  |  54<H>  ----------------------------<  205<H>  3.5   |  23  |  7.31<H>    Ca    9.8      06 Feb 2019 08:27  Phos  5.3     02-06  Mg     2.0     02-06    TPro  8.5<H>  /  Alb  3.7  /  TBili  0.4  /  DBili  x   /  AST  12  /  ALT  22  /  AlkPhos  174<H>  02-06    PT/INR - ( 06 Feb 2019 08:27 )   PT: 11.4 sec;   INR: 1.03 ratio             CAPILLARY BLOOD GLUCOSE      POCT Blood Glucose.: 158 mg/dL (06 Feb 2019 21:40)  POCT Blood Glucose.: 203 mg/dL (06 Feb 2019 16:40)  POCT Blood Glucose.: 204 mg/dL (06 Feb 2019 12:27)  POCT Blood Glucose.: 226 mg/dL (06 Feb 2019 08:13)      Consultant(s) Notes Reviewed:   YES    Care Discussed with Consultants : YES    Plan of care was discussed with patient and /or primary care giver; all questions and concerns were addressed and care was aligned with patient's wishes. Resident Note discussed with  primary attending    Patient is a 53y old  Female who presents with a chief complaint of vomiting (06 Feb 2019 16:39)      INTERVAL HPI/ OVERNIGHT EVENTS: patient had 2-3 episodes of brownish vomitus and feeling very sick and tired. Patient also complained of some blood in urine and stools last night.       MEDICATIONS  (STANDING):  amLODIPine   Tablet 5 milliGRAM(s) Oral daily  benztropine 0.5 milliGRAM(s) Oral two times a day  calcium acetate 1334 milliGRAM(s) Oral three times a day with meals  chlorhexidine 4% Liquid 1 Application(s) Topical <User Schedule>  docusate sodium 100 milliGRAM(s) Oral two times a day  epoetin seema Injectable 6000 Unit(s) SubCutaneous <User Schedule>  fluticasone propionate 50 MICROgram(s)/spray Nasal Spray 1 Spray(s) Both Nostrils two times a day  insulin glargine Injectable (LANTUS) 5 Unit(s) SubCutaneous at bedtime  insulin lispro (HumaLOG) corrective regimen sliding scale   SubCutaneous Before meals and at bedtime  insulin lispro Injectable (HumaLOG) 2 Unit(s) SubCutaneous three times a day before meals  levothyroxine 25 MICROGram(s) Oral daily  senna 2 Tablet(s) Oral at bedtime  simvastatin 20 milliGRAM(s) Oral at bedtime    MEDICATIONS  (PRN):      __________________________________________________  REVIEW OF SYSTEMS:    CONSTITUTIONAL: No fever  EYES: no acute visual disturbances  NECK: No pain or stiffness  RESPIRATORY: No cough; No shortness of breath  CARDIOVASCULAR: No chest pain, no palpitations  GASTROINTESTINAL: + abdominal pain. + Nausea or vomiting; + blood in stools last night; No diarrhea   NEUROLOGICAL: No headache or numbness, no tremors  MUSCULOSKELETAL: No joint pain, no muscle pain  GENITOURINARY: + hematuria; no dysuria, no frequency, no hesitancy  PSYCHIATRY: no depression , no anxiety  ALL OTHER  ROS negative        Vital Signs Last 24 Hrs  T(C): 37.2 (07 Feb 2019 07:42), Max: 37.3 (07 Feb 2019 00:34)  T(F): 99 (07 Feb 2019 07:42), Max: 99.2 (07 Feb 2019 00:34)  HR: 96 (07 Feb 2019 07:42) (74 - 96)  BP: 147/75 (07 Feb 2019 07:42) (111/74 - 152/73)  BP(mean): --  RR: 16 (07 Feb 2019 07:42) (16 - 16)  SpO2: 97% (07 Feb 2019 07:42) (97% - 100%)    ________________________________________________  PHYSICAL EXAM:  GENERAL: NAD  HEENT: Normocephalic;  conjunctivae and sclerae clear; moist mucous membranes;   NECK : supple  CHEST/LUNG: Clear to auscultation bilaterally with good air entry   HEART: S1 S2  regular; no murmurs, gallops or rubs  ABDOMEN: Soft, diffuse tenderness noted, Nondistended; Bowel sounds present  BACK: no ecchymosis, erythema or swelling at site of renal biopsy.   EXTREMITIES: no cyanosis; no edema; no calf tenderness  NERVOUS SYSTEM:  Awake and alert; Oriented  to place, person and time ; no new deficits    _________________________________________________  LABS:                        10.8   10.2  )-----------( 223      ( 06 Feb 2019 16:29 )             32.7     02-06    133<L>  |  98  |  54<H>  ----------------------------<  205<H>  3.5   |  23  |  7.31<H>    Ca    9.8      06 Feb 2019 08:27  Phos  5.3     02-06  Mg     2.0     02-06    TPro  8.5<H>  /  Alb  3.7  /  TBili  0.4  /  DBili  x   /  AST  12  /  ALT  22  /  AlkPhos  174<H>  02-06    PT/INR - ( 06 Feb 2019 08:27 )   PT: 11.4 sec;   INR: 1.03 ratio             CAPILLARY BLOOD GLUCOSE      POCT Blood Glucose.: 158 mg/dL (06 Feb 2019 21:40)  POCT Blood Glucose.: 203 mg/dL (06 Feb 2019 16:40)  POCT Blood Glucose.: 204 mg/dL (06 Feb 2019 12:27)  POCT Blood Glucose.: 226 mg/dL (06 Feb 2019 08:13)      Consultant(s) Notes Reviewed:   YES    Care Discussed with Consultants : YES    Plan of care was discussed with patient and /or primary care giver; all questions and concerns were addressed and care was aligned with patient's wishes.

## 2019-02-07 NOTE — PROGRESS NOTE ADULT - ASSESSMENT
A/P:  1. DEISY :Etiology Unclear ,  r/o ATN ,  No improvement of renal function.   -we will plan for hd tomorrow  -s/p renal biopsy today.we will f/u with pathology at [facility] for preliminary LM findings next 24 to 48 hrs  -Keep patient euvolemic and renal diet  -Avoid Nephrotoxic Meds/ Agents such as (NSAIDs, IV contrast, Aminoglycosides such as gentamicin, -Gadolinium contrast, Phosphate containing enemas, etc..)  -Adjust Medications according to eGFR  -f/u bmp daily  -f/u Hb later today and observe for gross hematuria   -f/u IR post biopsy protocol  2. 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 DEISY :Etiology Unclear ,  r/o ATN ,  No improvement of renal function.   -pt is being dialysed now.pt is tolerating the dialysis well.Hd orders written and discussed with dialysis RN  -f/u biopsy preliminary report later today  -Keep patient euvolemic and renal diet  -Avoid Nephrotoxic Meds/ Agents such as (NSAIDs, IV contrast, Aminoglycosides such as gentamicin, -Gadolinium contrast, Phosphate containing enemas, etc..)  -Adjust Medications according to eGFR  -f/u bmp daily  -f/u Hb later today and observe for gross hematuria   -f/u IR post biopsy protocol  -check ct abdomen/pelvis to r/o post biospy hematoma  2. 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

## 2019-02-07 NOTE — PROGRESS NOTE ADULT - PROBLEM SELECTOR PLAN 1
- On new dialysis via Rt permcath   - s/p IR guided kidney biopsy on 2/6, monitor the patient closely   - Patient had few episodes of vomitus and also complained of blood in stool and urine last night, CT abdomen/pelvis done: only showed trace perinephric hematoma, patient is hemodynamically stable currently.  - Urology consulted , f/u recommendations   - Monitor BMP  - Avoid nephrotoxic medications   - Nephro follow up  - PPD and hepatitis panel negative , social work following for setting up outpatient dialysis

## 2019-02-07 NOTE — PROGRESS NOTE ADULT - SUBJECTIVE AND OBJECTIVE BOX
pt seen and examined.pts current chart reviewed and case discussed with resident covering.    SUBJECTIVE:  pt feels fine and denies cp,sob,gi or gu/uremic  symptons    REVIEW OF SYSTEMS:  CONSTITUTIONAL: No weakness, fevers or chills  EYES/ENT: No visual changes;  No vertigo or throat pain   NECK: No pain or stiffness  RESPIRATORY: No cough, wheezing, hemoptysis; No shortness of breath  CARDIOVASCULAR: No chest pain or palpitations  GASTROINTESTINAL: No abdominal or epigastric pain. No nausea, vomiting, or hematemesis; No diarrhea or constipation. No melena or hematochezia.  GENITOURINARY: No dysuria, frequency , hematuria, flank pain or nocturia  NEUROLOGICAL: No numbness or weakness  SKIN: No itching, burning, rashes, or lesions   All other review of systems is negative unless indicated above    Current meds:    amLODIPine   Tablet 5 milliGRAM(s) Oral daily  benztropine 0.5 milliGRAM(s) Oral two times a day  calcium acetate 1334 milliGRAM(s) Oral three times a day with meals  chlorhexidine 4% Liquid 1 Application(s) Topical <User Schedule>  docusate sodium 100 milliGRAM(s) Oral two times a day  epoetin seema Injectable 6000 Unit(s) SubCutaneous <User Schedule>  fluticasone propionate 50 MICROgram(s)/spray Nasal Spray 1 Spray(s) Both Nostrils two times a day  insulin glargine Injectable (LANTUS) 5 Unit(s) SubCutaneous at bedtime  insulin lispro (HumaLOG) corrective regimen sliding scale   SubCutaneous Before meals and at bedtime  insulin lispro Injectable (HumaLOG) 2 Unit(s) SubCutaneous three times a day before meals  levothyroxine 25 MICROGram(s) Oral daily  ondansetron Injectable 4 milliGRAM(s) IV Push every 6 hours PRN  senna 2 Tablet(s) Oral at bedtime  simvastatin 20 milliGRAM(s) Oral at bedtime      Vital Signs    T(F): 99.1 (19 @ 08:00), Max: 99.2 (19 @ 00:34)  HR: 79 (19 @ 08:00) (74 - 96)  BP: 147/69 (19 @ 08:00) (111/74 - 152/73)  ABP: --  RR: 17 (19 @ 08:00) (16 - 17)  SpO2: 98% (19 @ 08:00) (97% - 100%)  Wt(kg): --  CVP(cm H2O): --  CO: --  PCWP: --    I and O's:    Daily     Daily Weight in k.3 (2019 08:00)    PHYSICAL EXAM:  Constitutional: well developed, well nourished  and in nad  HEENT: PERRLA,  no icteric sclera and mild pallor of conjunctiva noted  Neck: No JVD, thyromegaly or adenopathy  PC noted in Rt chest  Respiratory: reduced air entry lower lungs with no rales, wheezing or rhonchi  Cardiovascular: S1 and S2 normally heard  Gastrointestinal: soft, nondistended, nontender and normal bowel sounds heard  Extremities: No peripheral edema or cyanosis  Rt Femoral shiley cath in place   Neurological: A/O x 3, no focal deficits  Skin: No rashes    LABS:    CBC:                          10.1   10.6  )-----------( 220      ( 2019 09:29 )             31.4           BMP:        x   |  x   |  x   ----------------------------<  x   x    |  x   |  9.41<H>  02-06    133<L>  |  98  |  54<H>  ----------------------------<  205<H>  3.5   |  23  |  7.31<H>  02-05    137  |  102  |  57<H>  ----------------------------<  143<H>  3.4<L>   |  24  |  7.51<H>    Ca    9.8      2019 08:27  Ca    9.5      2019 07:20  Phos  6.6     02-07  Phos  5.3     02-06  Phos  4.6     02-05  Mg     2.2     02-07  Mg     2.0     02-06  Mg     2.1     02-05    TPro  8.5<H>  /  Alb  3.7  /  TBili  0.4  /  DBili  x   /  AST  12  /  ALT  22  /  AlkPhos  174<H>  02-06  TPro  8.3  /  Alb  3.6  /  TBili  0.3  /  DBili  x   /  AST  12  /  ALT  23  /  AlkPhos  174<H>  02-05          URINE STUDIES:                        RADIOLOGY & ADDITIONAL STUDIES: pt seen and examined.pts current chart reviewed and case discussed with resident covering.    SUBJECTIVE:  pt seen during dialysis  pt has been having vomiting  few times this am and now during dialysis  she denies cp,sob ,back pain or abdominal pain at this time  pt has been voiding bloody urine      Current meds:    amLODIPine   Tablet 5 milliGRAM(s) Oral daily  benztropine 0.5 milliGRAM(s) Oral two times a day  calcium acetate 1334 milliGRAM(s) Oral three times a day with meals  chlorhexidine 4% Liquid 1 Application(s) Topical <User Schedule>  docusate sodium 100 milliGRAM(s) Oral two times a day  epoetin seema Injectable 6000 Unit(s) SubCutaneous <User Schedule>  fluticasone propionate 50 MICROgram(s)/spray Nasal Spray 1 Spray(s) Both Nostrils two times a day  insulin glargine Injectable (LANTUS) 5 Unit(s) SubCutaneous at bedtime  insulin lispro (HumaLOG) corrective regimen sliding scale   SubCutaneous Before meals and at bedtime  insulin lispro Injectable (HumaLOG) 2 Unit(s) SubCutaneous three times a day before meals  levothyroxine 25 MICROGram(s) Oral daily  ondansetron Injectable 4 milliGRAM(s) IV Push every 6 hours PRN  senna 2 Tablet(s) Oral at bedtime  simvastatin 20 milliGRAM(s) Oral at bedtime      Vital Signs    T(F): 99.1 (19 @ 08:00), Max: 99.2 (19 @ 00:34)  HR: 79 (19 @ 08:00) (74 - 96)  BP: 147/69 (19 @ 08:00) (111/74 - 152/73)  ABP: --  RR: 17 (19 @ 08:00) (16 - 17)  SpO2: 98% (19 @ 08:00) (97% - 100%)  Wt(kg): --  CVP(cm H2O): --  CO: --  PCWP: --    I and O's:    Daily     Daily Weight in k.3 (2019 08:00)    PHYSICAL EXAM:  Constitutional: well developed, well nourished  and in nad  HEENT: PERRLA,  no icteric sclera and mild pallor of conjunctiva noted  Neck: No JVD, thyromegaly or adenopathy  PC noted in Rt chest  Respiratory: reduced air entry lower lungs with no rales, wheezing or rhonchi  Cardiovascular: S1 and S2 normally heard  Gastrointestinal: soft, not distended and normal bowel sounds heard  Back: no ecchymosis or  swelling at site of renal biopsy  Extremities: No peripheral edema or cyanosis  Neurological: A/O x 3, no focal deficits  Skin: No rashes    LABS:    CBC:                          10.1   10.6  )-----------( 220      ( 2019 09:29 )             31.4           BMP:    -  Magnesium, Serum (19 @ 09:29)    Magnesium, Serum: 2.2 mg/dL    Basic Metabolic Panel (19 @ 09:29)    Sodium, Serum: 132 mmol/L    Potassium, Serum: 4.2: Results verified.  Not hemolyzed. mmol/L    Chloride, Serum: 95 mmol/L    Carbon Dioxide, Serum: 24 mmol/L    Anion Gap, Serum: 13 mmol/L    Blood Urea Nitrogen, Serum: 75 mg/dL    Creatinine, Serum: 9.41 mg/dL    Glucose, Serum: 245 mg/dL    Calcium, Total Serum: 10.6 mg/dL    eGFR if Non : 4: Interpretative comment    eGFR if : 5 mL/min/1.73M2          133<L>  |  98  |  54<H>  ----------------------------<  205<H>  3.5   |  23  |  7.31<H>      137  |  102  |  57<H>  ----------------------------<  143<H>  3.4<L>   |  24  |  7.51<H>    Ca    9.8      2019 08:27  Ca    9.5      2019 07:20  Phos  6.6     02-07  Phos  5.3     -  Phos  4.6     02-05  Mg     2.2     -  Mg     2.0     02-06  Mg     2.1     -05    TPro  8.5<H>  /  Alb  3.7  /  TBili  0.4  /  DBili  x   /  AST  12  /  ALT  22  /  AlkPhos  174<H>  02-06  TPro  8.3  /  Alb  3.6  /  TBili  0.3  /  DBili  x   /  AST  12  /  ALT  23  /  AlkPhos  174<H>  02-05

## 2019-02-07 NOTE — CONSULT NOTE ADULT - SUBJECTIVE AND OBJECTIVE BOX
53y Female with PMHx of DM, schizophrenia, HLD admitted for evaluation of DEISY. She has undergone HD without improvement in creatinine and right renal biopsy was done in IR yesterday. Today she was noted to have hematuria and CT revealed right renal hemorrhage measuring 1.7x0.5cm. Patient denies pain, no dizziness, no dysuria, no difficulty urinating.    pmhx: as above  pshx: as above  meds:  amLODIPine   Tablet 5 milliGRAM(s) Oral daily  benztropine 0.5 milliGRAM(s) Oral two times a day  calcium acetate 1334 milliGRAM(s) Oral three times a day with meals  chlorhexidine 4% Liquid 1 Application(s) Topical <User Schedule>  docusate sodium 100 milliGRAM(s) Oral two times a day  epoetin seema Injectable 6000 Unit(s) SubCutaneous <User Schedule>  fluticasone propionate 50 MICROgram(s)/spray Nasal Spray 1 Spray(s) Both Nostrils two times a day  insulin glargine Injectable (LANTUS) 5 Unit(s) SubCutaneous at bedtime  insulin lispro (HumaLOG) corrective regimen sliding scale   SubCutaneous Before meals and at bedtime  insulin lispro Injectable (HumaLOG) 2 Unit(s) SubCutaneous three times a day before meals  levothyroxine 25 MICROGram(s) Oral daily  ondansetron Injectable 4 milliGRAM(s) IV Push every 6 hours PRN  pantoprazole  Injectable 40 milliGRAM(s) IV Push daily  senna 2 Tablet(s) Oral at bedtime  simvastatin 20 milliGRAM(s) Oral at bedtime    No Known Allergies    T(C): 37.3 (02-07-19 @ 15:41), Max: 37.3 (02-07-19 @ 00:34)  HR: 93 (02-07-19 @ 15:41) (79 - 96)  BP: 138/69 (02-07-19 @ 15:41) (121/73 - 152/73)  RR: 17 (02-07-19 @ 15:41) (16 - 18)  SpO2: 94% (02-07-19 @ 15:41) (94% - 100%)  Wt(kg): --    Gen:A&O x2, NAD  Skin: no rashes, no jaundice  Abdomen: soft, nontender, nondistended, no masses  Back: right renal biopsy site examined, dressing in place c/d/i, no hematoma, nontender  Lower Extremities: no edema, no skin discoloration, no calf tenderness azael                            10.1   10.6  )-----------( 220      ( 07 Feb 2019 09:29 )             31.4   02-07    132<L>  |  95<L>  |  75<H>  ----------------------------<  245<H>  4.2   |  24  |  9.41<H>    Ca    10.6<H>      07 Feb 2019 09:29  Phos  6.6     02-07  Mg     2.2     02-07    TPro  8.5<H>  /  Alb  3.7  /  TBili  0.4  /  DBili  x   /  AST  12  /  ALT  22  /  AlkPhos  174<H>  02-06  < from: CT Abdomen and Pelvis No Cont (02.07.19 @ 13:42) >  Comparison: 1/20/2019.    Findings: Limited sections through the lung bases appear unremarkable.    New small right perinephric air, likely due to recent right renal biopsy.   Trace 1.7 x 0.5 cm perinephric hemorrhage adjacent to the lower pole of   the right kidney anteriorly (image 42 series 2). No evidence for a   calculus in the kidneys or ureters. No hydronephrosis.    Allowing for the noncontrast technique, the liver, gallbladder, pancreas,   spleen, and adrenals appear grossly unremarkable. The common bile duct is   not dilated.     The appendix appears normal. No bowel obstruction or grossly thickened   bowel wall.    No evidence for free air, ascites, or enlarged lymph node.    The urinary and uterus appear grossly unremarkable.    Impression: New small right perinephric air, likely due to recent right   renal biopsy. New trace 1.7 x 0.5 cm perinephric hemorrhage adjacent to   the lower pole of the right kidney anteriorly.    < end of copied text > 53y Female with PMHx of DM, schizophrenia, HLD admitted for evaluation of DEISY. She recently underwent hemodialysis without improvement in creatinine and therefore, right renal biopsy was done in IR yesterday. Today she was noted to have hematuria and CT revealed right renal hemorrhage measuring 1.7x0.5cm. Patient denies pain, no dizziness, no dysuria, no difficulty urinating. Timing- after renal biopsy. No aggravating or alleviating factors.  Patient reports that she is doing much better now and urine is clear yellow.    pmhx: as above  pshx: as above  Family History: No family history of  malignancy  Social History: Does not smoke    ROS: All others negative except as noted above.      meds:  amLODIPine   Tablet 5 milliGRAM(s) Oral daily  benztropine 0.5 milliGRAM(s) Oral two times a day  calcium acetate 1334 milliGRAM(s) Oral three times a day with meals  chlorhexidine 4% Liquid 1 Application(s) Topical <User Schedule>  docusate sodium 100 milliGRAM(s) Oral two times a day  epoetin seema Injectable 6000 Unit(s) SubCutaneous <User Schedule>  fluticasone propionate 50 MICROgram(s)/spray Nasal Spray 1 Spray(s) Both Nostrils two times a day  insulin glargine Injectable (LANTUS) 5 Unit(s) SubCutaneous at bedtime  insulin lispro (HumaLOG) corrective regimen sliding scale   SubCutaneous Before meals and at bedtime  insulin lispro Injectable (HumaLOG) 2 Unit(s) SubCutaneous three times a day before meals  levothyroxine 25 MICROGram(s) Oral daily  ondansetron Injectable 4 milliGRAM(s) IV Push every 6 hours PRN  pantoprazole  Injectable 40 milliGRAM(s) IV Push daily  senna 2 Tablet(s) Oral at bedtime  simvastatin 20 milliGRAM(s) Oral at bedtime    No Known Allergies    T(C): 37.3 (02-07-19 @ 15:41), Max: 37.3 (02-07-19 @ 00:34)  HR: 93 (02-07-19 @ 15:41) (79 - 96)  BP: 138/69 (02-07-19 @ 15:41) (121/73 - 152/73)  RR: 17 (02-07-19 @ 15:41) (16 - 18)  SpO2: 94% (02-07-19 @ 15:41) (94% - 100%)  Wt(kg): --    Gen:A&O x2, NAD  Skin: no rashes, no jaundice  Abdomen: soft, nontender, nondistended, no masses  Back: right renal biopsy site examined, dressing in place c/d/i, no hematoma, nontender  Lower Extremities: no edema, no skin discoloration, no calf tenderness azael                            10.1   10.6  )-----------( 220      ( 07 Feb 2019 09:29 )             31.4   02-07    132<L>  |  95<L>  |  75<H>  ----------------------------<  245<H>  4.2   |  24  |  9.41<H>    Ca    10.6<H>      07 Feb 2019 09:29  Phos  6.6     02-07  Mg     2.2     02-07    TPro  8.5<H>  /  Alb  3.7  /  TBili  0.4  /  DBili  x   /  AST  12  /  ALT  22  /  AlkPhos  174<H>  02-06  < from: CT Abdomen and Pelvis No Cont (02.07.19 @ 13:42) >  Comparison: 1/20/2019.    Findings: Limited sections through the lung bases appear unremarkable.    New small right perinephric air, likely due to recent right renal biopsy.   Trace 1.7 x 0.5 cm perinephric hemorrhage adjacent to the lower pole of   the right kidney anteriorly (image 42 series 2). No evidence for a   calculus in the kidneys or ureters. No hydronephrosis.    Allowing for the noncontrast technique, the liver, gallbladder, pancreas,   spleen, and adrenals appear grossly unremarkable. The common bile duct is   not dilated.     The appendix appears normal. No bowel obstruction or grossly thickened   bowel wall.    No evidence for free air, ascites, or enlarged lymph node.    The urinary and uterus appear grossly unremarkable.    Impression: New small right perinephric air, likely due to recent right   renal biopsy. New trace 1.7 x 0.5 cm perinephric hemorrhage adjacent to   the lower pole of the right kidney anteriorly.    < end of copied text >

## 2019-02-07 NOTE — PROGRESS NOTE ADULT - ASSESSMENT
53 female from assisted living with PMH of DM, HLD, Schizophrenia came with complaint of vomiting, difficult to arouse and sweating. Patient noted to have acute renal failure with Cr of 7.74 and BUN of 58. Started on new dialysis after few days as no improvement noted. Permacath on 1/25 . PPD and Hepatitis panel negative. Permacath malfunctioning, urgent Shiley and HD on Feb 2, Permacath exchange done on 2/4. Rt groin shiley removed on 2/6. Kidney biopsy done on 2/6, prelim results are chronic tubulointerstitial disease.

## 2019-02-07 NOTE — PROGRESS NOTE ADULT - PROBLEM SELECTOR PLAN 4
- Holding home oral meds , Glucophage and Glimepiride  - continue Lantus 5 units qhs and Humalog 2 units pre meals along with HSS   - Monitor accu cheks AC / HS   - hbA1c 6.8

## 2019-02-07 NOTE — CONSULT NOTE ADULT - ASSESSMENT
52yo female s/p right renal biopsy with small hematoma, hematuria    1) no urological intervention for post biopsy hematoma  2) monitor urine output  4) case and plan discussed with Dr. Candelaria and agrees Very pleasant 54yo female s/p right renal biopsy with small hematoma, hematuria    1) CT images reviewed  2) Records from biopsy reviewed  2) monitor urine output  4) labs reviewed  5) repeat urinalysis and urine culture  6) No intervention at this time as hematoma is small and is expected after renal biopsy  7) Discussed with PA staff  8) Will follow

## 2019-02-08 LAB
ANION GAP SERPL CALC-SCNC: 13 MMOL/L — SIGNIFICANT CHANGE UP (ref 5–17)
BUN SERPL-MCNC: 59 MG/DL — HIGH (ref 7–18)
CALCIUM SERPL-MCNC: 9.7 MG/DL — SIGNIFICANT CHANGE UP (ref 8.4–10.5)
CHLORIDE SERPL-SCNC: 94 MMOL/L — LOW (ref 96–108)
CO2 SERPL-SCNC: 25 MMOL/L — SIGNIFICANT CHANGE UP (ref 22–31)
CREAT SERPL-MCNC: 8.81 MG/DL — HIGH (ref 0.5–1.3)
GLUCOSE BLDC GLUCOMTR-MCNC: 132 MG/DL — HIGH (ref 70–99)
GLUCOSE BLDC GLUCOMTR-MCNC: 216 MG/DL — HIGH (ref 70–99)
GLUCOSE BLDC GLUCOMTR-MCNC: 231 MG/DL — HIGH (ref 70–99)
GLUCOSE BLDC GLUCOMTR-MCNC: 267 MG/DL — HIGH (ref 70–99)
GLUCOSE SERPL-MCNC: 246 MG/DL — HIGH (ref 70–99)
HCT VFR BLD CALC: 31.2 % — LOW (ref 34.5–45)
HGB BLD-MCNC: 10.2 G/DL — LOW (ref 11.5–15.5)
MCHC RBC-ENTMCNC: 28.1 PG — SIGNIFICANT CHANGE UP (ref 27–34)
MCHC RBC-ENTMCNC: 32.5 GM/DL — SIGNIFICANT CHANGE UP (ref 32–36)
MCV RBC AUTO: 86.4 FL — SIGNIFICANT CHANGE UP (ref 80–100)
PHOSPHATE SERPL-MCNC: 5.1 MG/DL — HIGH (ref 2.5–4.5)
PLATELET # BLD AUTO: 206 K/UL — SIGNIFICANT CHANGE UP (ref 150–400)
POTASSIUM SERPL-MCNC: 3.9 MMOL/L — SIGNIFICANT CHANGE UP (ref 3.5–5.3)
POTASSIUM SERPL-SCNC: 3.9 MMOL/L — SIGNIFICANT CHANGE UP (ref 3.5–5.3)
RBC # BLD: 3.61 M/UL — LOW (ref 3.8–5.2)
RBC # FLD: 12.8 % — SIGNIFICANT CHANGE UP (ref 10.3–14.5)
SODIUM SERPL-SCNC: 132 MMOL/L — LOW (ref 135–145)
WBC # BLD: 9.1 K/UL — SIGNIFICANT CHANGE UP (ref 3.8–10.5)
WBC # FLD AUTO: 9.1 K/UL — SIGNIFICANT CHANGE UP (ref 3.8–10.5)

## 2019-02-08 PROCEDURE — 99231 SBSQ HOSP IP/OBS SF/LOW 25: CPT

## 2019-02-08 RX ORDER — INSULIN LISPRO 100/ML
4 VIAL (ML) SUBCUTANEOUS
Qty: 0 | Refills: 0 | Status: DISCONTINUED | OUTPATIENT
Start: 2019-02-08 | End: 2019-02-14

## 2019-02-08 RX ORDER — INSULIN GLARGINE 100 [IU]/ML
8 INJECTION, SOLUTION SUBCUTANEOUS AT BEDTIME
Qty: 0 | Refills: 0 | Status: DISCONTINUED | OUTPATIENT
Start: 2019-02-08 | End: 2019-02-14

## 2019-02-08 RX ADMIN — CHLORHEXIDINE GLUCONATE 1 APPLICATION(S): 213 SOLUTION TOPICAL at 14:58

## 2019-02-08 RX ADMIN — SIMVASTATIN 20 MILLIGRAM(S): 20 TABLET, FILM COATED ORAL at 21:31

## 2019-02-08 RX ADMIN — Medication 3: at 08:51

## 2019-02-08 RX ADMIN — Medication 1334 MILLIGRAM(S): at 08:52

## 2019-02-08 RX ADMIN — Medication 1334 MILLIGRAM(S): at 12:03

## 2019-02-08 RX ADMIN — INSULIN GLARGINE 8 UNIT(S): 100 INJECTION, SOLUTION SUBCUTANEOUS at 21:31

## 2019-02-08 RX ADMIN — Medication 100 MILLIGRAM(S): at 06:37

## 2019-02-08 RX ADMIN — Medication 2 UNIT(S): at 08:51

## 2019-02-08 RX ADMIN — Medication 4 UNIT(S): at 12:03

## 2019-02-08 RX ADMIN — Medication 2: at 12:03

## 2019-02-08 RX ADMIN — AMLODIPINE BESYLATE 5 MILLIGRAM(S): 2.5 TABLET ORAL at 06:37

## 2019-02-08 RX ADMIN — Medication 0.5 MILLIGRAM(S): at 06:37

## 2019-02-08 RX ADMIN — Medication 0.5 MILLIGRAM(S): at 21:31

## 2019-02-08 RX ADMIN — Medication 25 MICROGRAM(S): at 06:37

## 2019-02-08 RX ADMIN — PANTOPRAZOLE SODIUM 40 MILLIGRAM(S): 20 TABLET, DELAYED RELEASE ORAL at 12:02

## 2019-02-08 RX ADMIN — Medication 2: at 21:31

## 2019-02-08 RX ADMIN — Medication 1 SPRAY(S): at 06:37

## 2019-02-08 RX ADMIN — SENNA PLUS 2 TABLET(S): 8.6 TABLET ORAL at 21:31

## 2019-02-08 NOTE — PROGRESS NOTE ADULT - ASSESSMENT
Very pleasant 54yo female s/p right renal biopsy with small hematoma, hematuria    1) CT images reviewed  2) Records from biopsy reviewed  2) monitor urine output  4) labs reviewed  5) repeat urinalysis and urine culture  6) No intervention at this time as hematoma is small and is expected after renal biopsy  7) Discussed with PA staff  8) Will follow

## 2019-02-08 NOTE — PROGRESS NOTE ADULT - ASSESSMENT
A/P:  1. DEISY :Etiology Unclear ,  r/o ATN ,  No improvement of renal function.   -pt is being dialysed now.pt is tolerating the dialysis well.Hd orders written and discussed with dialysis RN  -f/u biopsy preliminary report later today  -Keep patient euvolemic and renal diet  -Avoid Nephrotoxic Meds/ Agents such as (NSAIDs, IV contrast, Aminoglycosides such as gentamicin, -Gadolinium contrast, Phosphate containing enemas, etc..)  -Adjust Medications according to eGFR  -f/u bmp daily  -f/u Hb later today and observe for gross hematuria   -f/u IR post biopsy protocol  -check ct abdomen/pelvis to r/o post biospy hematoma  2. 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 DEISY /ESRD: pt has chronic Tubulo-interstitial ds on renal biopsy of unclear etiology  -pt will most likely need long term chronic dialysis  -pt will need avf/avg as per vascular surgery  -pt will be dialysed again tomorrow   -Keep patient euvolemic and renal diet  -Avoid Nephrotoxic Meds/ Agents such as (NSAIDs, IV contrast, Aminoglycosides such as gentamicin, -Gadolinium contrast, Phosphate containing enemas, etc..)  -Adjust Medications according to eGFR  -f/u bmp daily  2. 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

## 2019-02-08 NOTE — PROGRESS NOTE ADULT - SUBJECTIVE AND OBJECTIVE BOX
Resident Note discussed with  primary attending    Patient is a 53y old  Female who presents with a chief complaint of vomiting (07 Feb 2019 17:04)      INTERVAL HPI/ OVERNIGHT EVENTS: no new complaints, just tired and sleepy     MEDICATIONS  (STANDING):  amLODIPine   Tablet 5 milliGRAM(s) Oral daily  benztropine 0.5 milliGRAM(s) Oral two times a day  calcium acetate 1334 milliGRAM(s) Oral three times a day with meals  chlorhexidine 4% Liquid 1 Application(s) Topical <User Schedule>  docusate sodium 100 milliGRAM(s) Oral two times a day  epoetin seema Injectable 6000 Unit(s) SubCutaneous <User Schedule>  fluticasone propionate 50 MICROgram(s)/spray Nasal Spray 1 Spray(s) Both Nostrils two times a day  insulin glargine Injectable (LANTUS) 5 Unit(s) SubCutaneous at bedtime  insulin lispro (HumaLOG) corrective regimen sliding scale   SubCutaneous Before meals and at bedtime  insulin lispro Injectable (HumaLOG) 2 Unit(s) SubCutaneous three times a day before meals  levothyroxine 25 MICROGram(s) Oral daily  pantoprazole  Injectable 40 milliGRAM(s) IV Push daily  senna 2 Tablet(s) Oral at bedtime  simvastatin 20 milliGRAM(s) Oral at bedtime    MEDICATIONS  (PRN):  ondansetron Injectable 4 milliGRAM(s) IV Push every 6 hours PRN Nausea and/or Vomiting      __________________________________________________  REVIEW OF SYSTEMS:    CONSTITUTIONAL: feeling tired and sleepy   EYES: no acute visual disturbances  NECK: No pain or stiffness  RESPIRATORY: No cough; No shortness of breath  CARDIOVASCULAR: No chest pain, no palpitations  GASTROINTESTINAL: No pain. No nausea or vomiting; No diarrhea   NEUROLOGICAL: No headache or numbness, no tremors  MUSCULOSKELETAL: No joint pain, no muscle pain  GENITOURINARY: no dysuria, no frequency, no hesitancy  PSYCHIATRY: no depression , no anxiety  ALL OTHER  ROS negative        Vital Signs Last 24 Hrs  T(C): 36.8 (08 Feb 2019 07:40), Max: 37.3 (07 Feb 2019 15:41)  T(F): 98.2 (08 Feb 2019 07:40), Max: 99.2 (07 Feb 2019 15:41)  HR: 74 (08 Feb 2019 07:40) (74 - 96)  BP: 118/53 (08 Feb 2019 07:40) (118/53 - 138/69)  BP(mean): --  RR: 16 (08 Feb 2019 07:40) (16 - 18)  SpO2: 99% (08 Feb 2019 07:40) (94% - 100%)    ________________________________________________  PHYSICAL EXAM:  GENERAL: sleepy  HEENT: Normocephalic;  conjunctivae and sclerae clear; moist mucous membranes;   NECK : supple  CHEST/LUNG: Clear to auscultation bilaterally with good air entry   HEART: S1 S2  regular; no murmurs, gallops or rubs  ABDOMEN: Soft, Nontender, Nondistended; Bowel sounds present  BACK: no ecchymosis, erythema or swelling at site of renal biopsy.   EXTREMITIES: no cyanosis; no edema; no calf tenderness  NERVOUS SYSTEM:  sleepy but answering questions appropriately     _________________________________________________  LABS:                        10.2   9.1   )-----------( 206      ( 08 Feb 2019 08:50 )             31.2     02-08    132<L>  |  94<L>  |  59<H>  ----------------------------<  246<H>  3.9   |  25  |  8.81<H>    Ca    9.7      08 Feb 2019 08:50  Phos  5.1     02-08  Mg     2.2     02-07          CAPILLARY BLOOD GLUCOSE      POCT Blood Glucose.: 267 mg/dL (08 Feb 2019 08:20)  POCT Blood Glucose.: 386 mg/dL (07 Feb 2019 21:30)  POCT Blood Glucose.: 342 mg/dL (07 Feb 2019 16:57)        RADIOLOGY & ADDITIONAL TESTS:    Imaging Personally Reviewed:  YES    < from: CT Abdomen and Pelvis No Cont (02.07.19 @ 13:42) >  Impression: New small right perinephric air, likely due to recent right   renal biopsy. New trace 1.7 x 0.5 cm perinephric hemorrhage adjacent to   the lower pole of the right kidney anteriorly.    < end of copied text >      Consultant(s) Notes Reviewed:   YES    Care Discussed with Consultants : YES    Plan of care was discussed with patient and /or primary care giver; all questions and concerns were addressed and care was aligned with patient's wishes. Resident Note discussed with  primary attending    Patient is a 53y old  Female who presents with a chief complaint of vomiting (07 Feb 2019 17:04)      INTERVAL HPI/ OVERNIGHT EVENTS: no new complaints, vomiting resolved, denies any blood in urine or stools. Just tired and sleepy     MEDICATIONS  (STANDING):  amLODIPine   Tablet 5 milliGRAM(s) Oral daily  benztropine 0.5 milliGRAM(s) Oral two times a day  calcium acetate 1334 milliGRAM(s) Oral three times a day with meals  chlorhexidine 4% Liquid 1 Application(s) Topical <User Schedule>  docusate sodium 100 milliGRAM(s) Oral two times a day  epoetin seema Injectable 6000 Unit(s) SubCutaneous <User Schedule>  fluticasone propionate 50 MICROgram(s)/spray Nasal Spray 1 Spray(s) Both Nostrils two times a day  insulin glargine Injectable (LANTUS) 5 Unit(s) SubCutaneous at bedtime  insulin lispro (HumaLOG) corrective regimen sliding scale   SubCutaneous Before meals and at bedtime  insulin lispro Injectable (HumaLOG) 2 Unit(s) SubCutaneous three times a day before meals  levothyroxine 25 MICROGram(s) Oral daily  pantoprazole  Injectable 40 milliGRAM(s) IV Push daily  senna 2 Tablet(s) Oral at bedtime  simvastatin 20 milliGRAM(s) Oral at bedtime    MEDICATIONS  (PRN):  ondansetron Injectable 4 milliGRAM(s) IV Push every 6 hours PRN Nausea and/or Vomiting      __________________________________________________  REVIEW OF SYSTEMS:    CONSTITUTIONAL: feeling tired and sleepy   EYES: no acute visual disturbances  NECK: No pain or stiffness  RESPIRATORY: No cough; No shortness of breath  CARDIOVASCULAR: No chest pain, no palpitations  GASTROINTESTINAL: No pain. No nausea or vomiting; No diarrhea   NEUROLOGICAL: No headache or numbness, no tremors  MUSCULOSKELETAL: No joint pain, no muscle pain  GENITOURINARY: no dysuria, no frequency, no hesitancy  PSYCHIATRY: no depression , no anxiety  ALL OTHER  ROS negative        Vital Signs Last 24 Hrs  T(C): 36.8 (08 Feb 2019 07:40), Max: 37.3 (07 Feb 2019 15:41)  T(F): 98.2 (08 Feb 2019 07:40), Max: 99.2 (07 Feb 2019 15:41)  HR: 74 (08 Feb 2019 07:40) (74 - 96)  BP: 118/53 (08 Feb 2019 07:40) (118/53 - 138/69)  BP(mean): --  RR: 16 (08 Feb 2019 07:40) (16 - 18)  SpO2: 99% (08 Feb 2019 07:40) (94% - 100%)    ________________________________________________  PHYSICAL EXAM:  GENERAL: sleepy  HEENT: Normocephalic;  conjunctivae and sclerae clear; moist mucous membranes;   NECK : supple  CHEST/LUNG: Clear to auscultation bilaterally with good air entry   HEART: S1 S2  regular; no murmurs, gallops or rubs  ABDOMEN: Soft, Nontender, Nondistended; Bowel sounds present  BACK: no ecchymosis, erythema or swelling at site of renal biopsy.   EXTREMITIES: no cyanosis; no edema; no calf tenderness  NERVOUS SYSTEM:  sleepy but answering questions appropriately     _________________________________________________  LABS:                        10.2   9.1   )-----------( 206      ( 08 Feb 2019 08:50 )             31.2     02-08    132<L>  |  94<L>  |  59<H>  ----------------------------<  246<H>  3.9   |  25  |  8.81<H>    Ca    9.7      08 Feb 2019 08:50  Phos  5.1     02-08  Mg     2.2     02-07          CAPILLARY BLOOD GLUCOSE      POCT Blood Glucose.: 267 mg/dL (08 Feb 2019 08:20)  POCT Blood Glucose.: 386 mg/dL (07 Feb 2019 21:30)  POCT Blood Glucose.: 342 mg/dL (07 Feb 2019 16:57)        RADIOLOGY & ADDITIONAL TESTS:    Imaging Personally Reviewed:  YES    < from: CT Abdomen and Pelvis No Cont (02.07.19 @ 13:42) >  Impression: New small right perinephric air, likely due to recent right   renal biopsy. New trace 1.7 x 0.5 cm perinephric hemorrhage adjacent to   the lower pole of the right kidney anteriorly.    < end of copied text >      Consultant(s) Notes Reviewed:   YES    Care Discussed with Consultants : YES    Plan of care was discussed with patient and /or primary care giver; all questions and concerns were addressed and care was aligned with patient's wishes.

## 2019-02-08 NOTE — PROGRESS NOTE ADULT - SUBJECTIVE AND OBJECTIVE BOX
pt seen and examined.pts current chart reviewed and case discussed with resident covering.    SUBJECTIVE:  pt feels fine and denies cp,sob,gi or gu/uremic  symptons    REVIEW OF SYSTEMS:  CONSTITUTIONAL: No weakness, fevers or chills  EYES/ENT: No visual changes;  No vertigo or throat pain   NECK: No pain or stiffness  RESPIRATORY: No cough, wheezing, hemoptysis; No shortness of breath  CARDIOVASCULAR: No chest pain or palpitations  GASTROINTESTINAL: No abdominal or epigastric pain. No nausea, vomiting, or hematemesis; No diarrhea or constipation. No melena or hematochezia.  GENITOURINARY: No dysuria, frequency , hematuria, flank pain or nocturia  NEUROLOGICAL: No numbness or weakness  SKIN: No itching, burning, rashes, or lesions   All other review of systems is negative unless indicated above    Current meds:    amLODIPine   Tablet 5 milliGRAM(s) Oral daily  benztropine 0.5 milliGRAM(s) Oral two times a day  calcium acetate 1334 milliGRAM(s) Oral three times a day with meals  chlorhexidine 4% Liquid 1 Application(s) Topical <User Schedule>  docusate sodium 100 milliGRAM(s) Oral two times a day  epoetin semea Injectable 6000 Unit(s) SubCutaneous <User Schedule>  fluticasone propionate 50 MICROgram(s)/spray Nasal Spray 1 Spray(s) Both Nostrils two times a day  insulin glargine Injectable (LANTUS) 8 Unit(s) SubCutaneous at bedtime  insulin lispro (HumaLOG) corrective regimen sliding scale   SubCutaneous Before meals and at bedtime  insulin lispro Injectable (HumaLOG) 4 Unit(s) SubCutaneous three times a day before meals  levothyroxine 25 MICROGram(s) Oral daily  ondansetron Injectable 4 milliGRAM(s) IV Push every 6 hours PRN  pantoprazole  Injectable 40 milliGRAM(s) IV Push daily  senna 2 Tablet(s) Oral at bedtime  simvastatin 20 milliGRAM(s) Oral at bedtime      Vital Signs    T(F): 98.7 (02-08-19 @ 15:50), Max: 98.7 (02-08-19 @ 00:16)  HR: 89 (02-08-19 @ 15:50) (74 - 96)  BP: 130/65 (02-08-19 @ 15:50) (118/53 - 134/79)  ABP: --  RR: 16 (02-08-19 @ 15:50) (16 - 16)  SpO2: 100% (02-08-19 @ 15:50) (96% - 100%)  Wt(kg): --  CVP(cm H2O): --  CO: --  PCWP: --    I and O's:    Daily     Daily     PHYSICAL EXAM:  Constitutional: well developed, well nourished  and in nad  HEENT: PERRLA,  no icteric sclera and mild pallor of conjunctiva noted  Neck: No JVD, thyromegaly or adenopathy  PC noted in Rt chest  Respiratory: reduced air entry lower lungs with no rales, wheezing or rhonchi  Cardiovascular: S1 and S2 normally heard  Gastrointestinal: soft, not distended and normal bowel sounds heard  Back: no ecchymosis or  swelling at site of renal biopsy  Extremities: No peripheral edema or cyanosis  Neurological: A/O x 3, no focal deficits  Skin: No rashes      LABS:    CBC:                          10.2   9.1   )-----------( 206      ( 08 Feb 2019 08:50 )             31.2           BMP:    02-08    132<L>  |  94<L>  |  59<H>  ----------------------------<  246<H>  3.9   |  25  |  8.81<H>  02-07    132<L>  |  95<L>  |  75<H>  ----------------------------<  245<H>  4.2   |  24  |  9.41<H>  02-06    133<L>  |  98  |  54<H>  ----------------------------<  205<H>  3.5   |  23  |  7.31<H>    Ca    9.7      08 Feb 2019 08:50  Ca    10.6<H>      07 Feb 2019 09:29  Ca    9.8      06 Feb 2019 08:27  Phos  5.1     02-08  Phos  6.6     02-07  Phos  5.3     02-06  Mg     2.2     02-07  Mg     2.0     02-06    TPro  8.5<H>  /  Alb  3.7  /  TBili  0.4  /  DBili  x   /  AST  12  /  ALT  22  /  AlkPhos  174<H>  02-06          URINE STUDIES:                        RADIOLOGY & ADDITIONAL STUDIES: pt seen and examined.    SUBJECTIVE:  pt feels fine today and denies cp,sob,gi or gu symptons  pt denies blood in urine today, seen by urology.    Current meds:    amLODIPine   Tablet 5 milliGRAM(s) Oral daily  benztropine 0.5 milliGRAM(s) Oral two times a day  calcium acetate 1334 milliGRAM(s) Oral three times a day with meals  chlorhexidine 4% Liquid 1 Application(s) Topical <User Schedule>  docusate sodium 100 milliGRAM(s) Oral two times a day  epoetin seema Injectable 6000 Unit(s) SubCutaneous <User Schedule>  fluticasone propionate 50 MICROgram(s)/spray Nasal Spray 1 Spray(s) Both Nostrils two times a day  insulin glargine Injectable (LANTUS) 8 Unit(s) SubCutaneous at bedtime  insulin lispro (HumaLOG) corrective regimen sliding scale   SubCutaneous Before meals and at bedtime  insulin lispro Injectable (HumaLOG) 4 Unit(s) SubCutaneous three times a day before meals  levothyroxine 25 MICROGram(s) Oral daily  ondansetron Injectable 4 milliGRAM(s) IV Push every 6 hours PRN  pantoprazole  Injectable 40 milliGRAM(s) IV Push daily  senna 2 Tablet(s) Oral at bedtime  simvastatin 20 milliGRAM(s) Oral at bedtime      Vital Signs    T(F): 98.7 (02-08-19 @ 15:50), Max: 98.7 (02-08-19 @ 00:16)  HR: 89 (02-08-19 @ 15:50) (74 - 96)  BP: 130/65 (02-08-19 @ 15:50) (118/53 - 134/79)  ABP: --  RR: 16 (02-08-19 @ 15:50) (16 - 16)  SpO2: 100% (02-08-19 @ 15:50) (96% - 100%)  Wt(kg): --  CVP(cm H2O): --  CO: --  PCWP: --    I and O's:    Daily     Daily     PHYSICAL EXAM:  Constitutional: well developed, well nourished  and in nad  HEENT: PERRLA,  no icteric sclera and mild pallor of conjunctiva noted  Neck: No JVD, thyromegaly or adenopathy  PC noted in Rt chest  Respiratory: reduced air entry lower lungs with no rales, wheezing or rhonchi  Cardiovascular: S1 and S2 normally heard  Gastrointestinal: soft, not distended and normal bowel sounds heard  Back: no ecchymosis or  swelling at site of renal biopsy  Extremities: No peripheral edema or cyanosis  Neurological: A/O x 3, no focal deficits  Skin: No rashes      LABS:    CBC:                          10.2   9.1   )-----------( 206      ( 08 Feb 2019 08:50 )             31.2           BMP:    02-08    132<L>  |  94<L>  |  59<H>  ----------------------------<  246<H>  3.9   |  25  |  8.81<H>  02-07    132<L>  |  95<L>  |  75<H>  ----------------------------<  245<H>  4.2   |  24  |  9.41<H>  02-06    133<L>  |  98  |  54<H>  ----------------------------<  205<H>  3.5   |  23  |  7.31<H>    Ca    9.7      08 Feb 2019 08:50  Ca    10.6<H>      07 Feb 2019 09:29  Ca    9.8      06 Feb 2019 08:27  Phos  5.1     02-08  Phos  6.6     02-07  Phos  5.3     02-06  Mg     2.2     02-07  Mg     2.0     02-06    TPro  8.5<H>  /  Alb  3.7  /  TBili  0.4  /  DBili  x   /  AST  12  /  ALT  22  /  AlkPhos  174<H>  02-06

## 2019-02-08 NOTE — PROGRESS NOTE ADULT - PROBLEM SELECTOR PLAN 1
- On new dialysis via Rt permacath    - s/p IR guided kidney biopsy on 2/6, monitor the patient closely   - Patient had few episodes of vomitus and also complained of blood in stool and urine on night of kidney biospy, CT abdomen/pelvis done: only showed trace perinephric hematoma, patient is hemodynamically stable currently.  - Urology eval appreciated   - H/H stable   - Monitor BMP  - Avoid nephrotoxic medications   - Nephro follow up  - PPD and hepatitis panel negative , social work following for setting up outpatient dialysis - On new dialysis via Rt permacath    - s/p IR guided kidney biopsy on 2/6, monitor the patient closely   - Patient had few episodes of vomitus and also complained of blood in stool and urine on night of kidney biospy, CT abdomen/pelvis done: only showed trace perinephric hematoma, patient is hemodynamically stable currently.  - Urology eval appreciated   - H/H stable, resume aspirin and DVT ppx on 2/10 if no bleeding   - Monitor BMP  - Avoid nephrotoxic medications   - Nephro follow up  - PPD and hepatitis panel negative , social work following for setting up outpatient dialysis - On new dialysis via Rt permacath    - s/p IR guided kidney biopsy on 2/6, monitor the patient closely   - Patient had few episodes of vomitus and also complained of blood in stool and urine on night of kidney biospy, CT abdomen/pelvis done: only showed trace perinephric hematoma, patient is hemodynamically stable currently.  - Urology eval appreciated   - H/H stable, resume aspirin and DVT ppx on 2/10 if no bleeding   - Monitor BMP  - Avoid nephrotoxic medications   - Nephro follow up  - PPD and hepatitis panel negative , social work following for setting up outpatient dialysis  - f/u Vascular before discharge to make appt for AVF/AVG placement

## 2019-02-08 NOTE — PROGRESS NOTE ADULT - PROBLEM SELECTOR PLAN 4
- Holding home oral meds , Glucophage and Glimepiride  - FS running high , insulin regimen adjusted , changed Lantus to 8 units qhs and increased Humalog pre meals to 4 units tid  - continue HSS   - Monitor accu cheks AC / HS   - hbA1c 6.8 - Holding home oral meds , Glucophage and Glimepiride  - FS running high , insulin regimen adjusted , changed Lantus to 8 units qhs and increased Humalog pre meals to 4 units tid  - continue HSS   - Monitor accu cheks AC / HS   - hbA1c 6.8  - Discharge on Lantus 10 units qhs with accu check monitoring

## 2019-02-08 NOTE — PROGRESS NOTE ADULT - SUBJECTIVE AND OBJECTIVE BOX
Patient seen/examined.  Feels better. No events overnight.    Vital Signs Last 24 Hrs  T(C): 36.8 (08 Feb 2019 07:40), Max: 37.3 (07 Feb 2019 15:41)  T(F): 98.2 (08 Feb 2019 07:40), Max: 99.2 (07 Feb 2019 15:41)  HR: 74 (08 Feb 2019 07:40) (74 - 96)  BP: 118/53 (08 Feb 2019 07:40) (118/53 - 138/69)  BP(mean): --  RR: 16 (08 Feb 2019 07:40) (16 - 17)  SpO2: 99% (08 Feb 2019 07:40) (94% - 99%)      General: NAD. AAOx3  Abd: soft. NT/ND                          10.2   9.1   )-----------( 206      ( 08 Feb 2019 08:50 )             31.2       02-08    132<L>  |  94<L>  |  59<H>  ----------------------------<  246<H>  3.9   |  25  |  8.81<H>    Ca    9.7      08 Feb 2019 08:50  Phos  5.1     02-08  Mg     2.2     02-07

## 2019-02-09 LAB
GLUCOSE BLDC GLUCOMTR-MCNC: 131 MG/DL — HIGH (ref 70–99)
GLUCOSE BLDC GLUCOMTR-MCNC: 169 MG/DL — HIGH (ref 70–99)
GLUCOSE BLDC GLUCOMTR-MCNC: 265 MG/DL — HIGH (ref 70–99)
GLUCOSE BLDC GLUCOMTR-MCNC: 285 MG/DL — HIGH (ref 70–99)
OB PNL STL: NEGATIVE — SIGNIFICANT CHANGE UP

## 2019-02-09 PROCEDURE — 99231 SBSQ HOSP IP/OBS SF/LOW 25: CPT

## 2019-02-09 RX ORDER — LACTULOSE 10 G/15ML
10 SOLUTION ORAL ONCE
Qty: 0 | Refills: 0 | Status: COMPLETED | OUTPATIENT
Start: 2019-02-09 | End: 2019-02-09

## 2019-02-09 RX ORDER — MAGNESIUM HYDROXIDE 400 MG/1
30 TABLET, CHEWABLE ORAL DAILY
Qty: 0 | Refills: 0 | Status: DISCONTINUED | OUTPATIENT
Start: 2019-02-09 | End: 2019-02-09

## 2019-02-09 RX ADMIN — AMLODIPINE BESYLATE 5 MILLIGRAM(S): 2.5 TABLET ORAL at 07:00

## 2019-02-09 RX ADMIN — Medication 3: at 17:02

## 2019-02-09 RX ADMIN — Medication 1: at 21:39

## 2019-02-09 RX ADMIN — Medication 1 SPRAY(S): at 17:02

## 2019-02-09 RX ADMIN — Medication 1 SPRAY(S): at 07:00

## 2019-02-09 RX ADMIN — SIMVASTATIN 20 MILLIGRAM(S): 20 TABLET, FILM COATED ORAL at 21:39

## 2019-02-09 RX ADMIN — Medication 0.5 MILLIGRAM(S): at 17:02

## 2019-02-09 RX ADMIN — Medication 100 MILLIGRAM(S): at 17:02

## 2019-02-09 RX ADMIN — SENNA PLUS 2 TABLET(S): 8.6 TABLET ORAL at 21:39

## 2019-02-09 RX ADMIN — Medication 3: at 08:46

## 2019-02-09 RX ADMIN — CHLORHEXIDINE GLUCONATE 1 APPLICATION(S): 213 SOLUTION TOPICAL at 14:51

## 2019-02-09 RX ADMIN — Medication 4 UNIT(S): at 08:47

## 2019-02-09 RX ADMIN — Medication 4 UNIT(S): at 17:02

## 2019-02-09 RX ADMIN — PANTOPRAZOLE SODIUM 40 MILLIGRAM(S): 20 TABLET, DELAYED RELEASE ORAL at 14:51

## 2019-02-09 RX ADMIN — ONDANSETRON 4 MILLIGRAM(S): 8 TABLET, FILM COATED ORAL at 14:51

## 2019-02-09 RX ADMIN — INSULIN GLARGINE 8 UNIT(S): 100 INJECTION, SOLUTION SUBCUTANEOUS at 21:39

## 2019-02-09 RX ADMIN — Medication 0.5 MILLIGRAM(S): at 07:00

## 2019-02-09 RX ADMIN — Medication 1334 MILLIGRAM(S): at 08:47

## 2019-02-09 RX ADMIN — LACTULOSE 10 GRAM(S): 10 SOLUTION ORAL at 16:27

## 2019-02-09 RX ADMIN — Medication 100 MILLIGRAM(S): at 07:00

## 2019-02-09 RX ADMIN — ONDANSETRON 4 MILLIGRAM(S): 8 TABLET, FILM COATED ORAL at 21:42

## 2019-02-09 RX ADMIN — Medication 25 MICROGRAM(S): at 07:00

## 2019-02-09 RX ADMIN — ERYTHROPOIETIN 6000 UNIT(S): 10000 INJECTION, SOLUTION INTRAVENOUS; SUBCUTANEOUS at 10:50

## 2019-02-09 NOTE — PROGRESS NOTE ADULT - SUBJECTIVE AND OBJECTIVE BOX
Patient seen/examined.  Feels better. No complaints. no fevers, no flank pain      Vital Signs Last 24 Hrs  T(C): 37.1 (09 Feb 2019 07:36), Max: 37.1 (08 Feb 2019 15:50)  T(F): 98.7 (09 Feb 2019 07:36), Max: 98.7 (08 Feb 2019 15:50)  HR: 85 (09 Feb 2019 07:36) (80 - 89)  BP: 160/68 (09 Feb 2019 07:36) (130/65 - 160/68)  BP(mean): --  RR: 16 (09 Feb 2019 07:36) (16 - 16)  SpO2: 100% (09 Feb 2019 07:36) (100% - 100%)    General: NAD. AAOx3  Abd: soft. NT/ND                          10.2   9.1   )-----------( 206      ( 08 Feb 2019 08:50 )             31.2     02-08    132<L>  |  94<L>  |  59<H>  ----------------------------<  246<H>  3.9   |  25  |  8.81<H>    Ca    9.7      08 Feb 2019 08:50  Phos  5.1     02-08

## 2019-02-09 NOTE — CHART NOTE - NSCHARTNOTEFT_GEN_A_CORE
Pt stated she's constipated even being on colace & senna daily. Did not have a bowel movement in a couple of days. Her abdomen is soft, non-tender, non distended, with + bowel sound in all 4 quadrants. Milk of magnesia 30cc, daily, PRN, was added to her bowel regimen.

## 2019-02-09 NOTE — PROGRESS NOTE ADULT - ASSESSMENT
A/P:  1. DEISY /ESRD: pt has chronic Tubulo-interstitial ds on renal biopsy of unclear etiology  -pt will most likely need long term chronic dialysis  -pt will need avf/avg as per vascular surgery  -pt will be dialysed again tomorrow   -Keep patient euvolemic and renal diet  -Avoid Nephrotoxic Meds/ Agents such as (NSAIDs, IV contrast, Aminoglycosides such as gentamicin, -Gadolinium contrast, Phosphate containing enemas, etc..)  -Adjust Medications according to eGFR  -f/u bmp daily  2. 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 DEISY /ESRD: pt has chronic Tubulo-interstitial ds on renal biopsy of unclear etiology  -pt will most likely need long term chronic dialysis  -pt will need avf/avg as per vascular surgery  -pt is being dialysed now.Pt tolerating the dialysis well via Rt chest PC   -HD orders written and discussed with dialysis RN  -Keep patient euvolemic and renal diet  -Avoid Nephrotoxic Meds/ Agents such as (NSAIDs, IV contrast, Aminoglycosides such as gentamicin, -Gadolinium contrast, Phosphate containing enemas, etc..)  -Adjust Medications according to eGFR  -f/u bmp daily  2. 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

## 2019-02-09 NOTE — PROGRESS NOTE ADULT - SUBJECTIVE AND OBJECTIVE BOX
pt seen and examined.pts current chart reviewed and case discussed with resident covering.    SUBJECTIVE:  pt feels fine and denies cp,sob,gi or gu/uremic  symptons    REVIEW OF SYSTEMS:  CONSTITUTIONAL: No weakness, fevers or chills  EYES/ENT: No visual changes;  No vertigo or throat pain   NECK: No pain or stiffness  RESPIRATORY: No cough, wheezing, hemoptysis; No shortness of breath  CARDIOVASCULAR: No chest pain or palpitations  GASTROINTESTINAL: No abdominal or epigastric pain. No nausea, vomiting, or hematemesis; No diarrhea or constipation. No melena or hematochezia.  GENITOURINARY: No dysuria, frequency , hematuria, flank pain or nocturia  NEUROLOGICAL: No numbness or weakness  SKIN: No itching, burning, rashes, or lesions   All other review of systems is negative unless indicated above    Current meds:    amLODIPine   Tablet 5 milliGRAM(s) Oral daily  benztropine 0.5 milliGRAM(s) Oral two times a day  calcium acetate 1334 milliGRAM(s) Oral three times a day with meals  chlorhexidine 4% Liquid 1 Application(s) Topical <User Schedule>  docusate sodium 100 milliGRAM(s) Oral two times a day  epoetin seema Injectable 6000 Unit(s) SubCutaneous <User Schedule>  fluticasone propionate 50 MICROgram(s)/spray Nasal Spray 1 Spray(s) Both Nostrils two times a day  insulin glargine Injectable (LANTUS) 8 Unit(s) SubCutaneous at bedtime  insulin lispro (HumaLOG) corrective regimen sliding scale   SubCutaneous Before meals and at bedtime  insulin lispro Injectable (HumaLOG) 4 Unit(s) SubCutaneous three times a day before meals  levothyroxine 25 MICROGram(s) Oral daily  ondansetron Injectable 4 milliGRAM(s) IV Push every 6 hours PRN  pantoprazole  Injectable 40 milliGRAM(s) IV Push daily  senna 2 Tablet(s) Oral at bedtime  simvastatin 20 milliGRAM(s) Oral at bedtime      Vital Signs    T(F): 98.7 (02-09-19 @ 07:36), Max: 98.7 (02-08-19 @ 15:50)  HR: 85 (02-09-19 @ 07:36) (80 - 89)  BP: 160/68 (02-09-19 @ 07:36) (130/65 - 160/68)  ABP: --  RR: 16 (02-09-19 @ 07:36) (16 - 16)  SpO2: 100% (02-09-19 @ 07:36) (100% - 100%)  Wt(kg): --  CVP(cm H2O): --  CO: --  PCWP: --    I and O's:    Daily     Daily     PHYSICAL EXAM:  Constitutional: well developed, well nourished  and in nad  HEENT: PERRLA,  no icteric sclera and mild pallor of conjunctiva noted  Neck: No JVD, thyromegaly or adenopathy  PC noted in Rt chest  Respiratory: reduced air entry lower lungs with no rales, wheezing or rhonchi  Cardiovascular: S1 and S2 normally heard  Gastrointestinal: soft, not distended and normal bowel sounds heard  Back: no ecchymosis or  swelling at site of renal biopsy  Extremities: No peripheral edema or cyanosis  Neurological: A/O x 3, no focal deficits  Skin: No rashes      LABS:    CBC:                          10.2   9.1   )-----------( 206      ( 08 Feb 2019 08:50 )             31.2           BMP:    02-08    132<L>  |  94<L>  |  59<H>  ----------------------------<  246<H>  3.9   |  25  |  8.81<H>  02-07    132<L>  |  95<L>  |  75<H>  ----------------------------<  245<H>  4.2   |  24  |  9.41<H>    Ca    9.7      08 Feb 2019 08:50  Ca    10.6<H>      07 Feb 2019 09:29  Phos  5.1     02-08  Phos  6.6     02-07  Mg     2.2     02-07            URINE STUDIES:                        RADIOLOGY & ADDITIONAL STUDIES: pt seen and examined.    SUBJECTIVE:  pt feels ok and c/o constipation and left sided lower abdominal dyscomfort  we will add TWE and avoid MOM as pt has renal failure  she denies cp,sob,flank pain or bloody urine  pt is voiding normal urine but u/o     Current meds:    amLODIPine   Tablet 5 milliGRAM(s) Oral daily  benztropine 0.5 milliGRAM(s) Oral two times a day  calcium acetate 1334 milliGRAM(s) Oral three times a day with meals  chlorhexidine 4% Liquid 1 Application(s) Topical <User Schedule>  docusate sodium 100 milliGRAM(s) Oral two times a day  epoetin seema Injectable 6000 Unit(s) SubCutaneous <User Schedule>  fluticasone propionate 50 MICROgram(s)/spray Nasal Spray 1 Spray(s) Both Nostrils two times a day  insulin glargine Injectable (LANTUS) 8 Unit(s) SubCutaneous at bedtime  insulin lispro (HumaLOG) corrective regimen sliding scale   SubCutaneous Before meals and at bedtime  insulin lispro Injectable (HumaLOG) 4 Unit(s) SubCutaneous three times a day before meals  levothyroxine 25 MICROGram(s) Oral daily  ondansetron Injectable 4 milliGRAM(s) IV Push every 6 hours PRN  pantoprazole  Injectable 40 milliGRAM(s) IV Push daily  senna 2 Tablet(s) Oral at bedtime  simvastatin 20 milliGRAM(s) Oral at bedtime      Vital Signs    T(F): 98.7 (02-09-19 @ 07:36), Max: 98.7 (02-08-19 @ 15:50)  HR: 85 (02-09-19 @ 07:36) (80 - 89)  BP: 160/68 (02-09-19 @ 07:36) (130/65 - 160/68)  ABP: --  RR: 16 (02-09-19 @ 07:36) (16 - 16)  SpO2: 100% (02-09-19 @ 07:36) (100% - 100%)  Wt(kg): --  CVP(cm H2O): --  CO: --  PCWP: --    I and O's:    Daily     Daily     PHYSICAL EXAM:  Constitutional: well developed, well nourished  and in nad  HEENT: PERRLA,  no icteric sclera and mild pallor of conjunctiva noted  Neck: No JVD, thyromegaly or adenopathy  PC noted in Rt chest  Respiratory: reduced air entry lower lungs with no rales, wheezing or rhonchi  Cardiovascular: S1 and S2 normally heard  Gastrointestinal: soft, not distended ,mild LLQ tendernss on palpation present and normal bowel sounds heard  Back: no cva tenderness or swelling noted   Extremities: No peripheral edema or cyanosis  Neurological: A/O x 3, no focal deficits  Skin: No rashes      LABS: pending  in dialysis    CBC:                          10.2   9.1   )-----------( 206      ( 08 Feb 2019 08:50 )             31.2           BMP:    02-08    132<L>  |  94<L>  |  59<H>  ----------------------------<  246<H>  3.9   |  25  |  8.81<H>  02-07    132<L>  |  95<L>  |  75<H>  ----------------------------<  245<H>  4.2   |  24  |  9.41<H>    Ca    9.7      08 Feb 2019 08:50  Ca    10.6<H>      07 Feb 2019 09:29  Phos  5.1     02-08  Phos  6.6     02-07  Mg     2.2     02-07

## 2019-02-09 NOTE — PROGRESS NOTE ADULT - ASSESSMENT
Very pleasant 54yo female s/p right renal biopsy with small hematoma, hematuria      -flank pain improved  -No intervention at this time as hematoma is small and is expected after renal biopsy  -Discussed with PA staff

## 2019-02-10 LAB
ANION GAP SERPL CALC-SCNC: 15 MMOL/L — SIGNIFICANT CHANGE UP (ref 5–17)
BUN SERPL-MCNC: 64 MG/DL — HIGH (ref 7–18)
CALCIUM SERPL-MCNC: 9.8 MG/DL — SIGNIFICANT CHANGE UP (ref 8.4–10.5)
CHLORIDE SERPL-SCNC: 92 MMOL/L — LOW (ref 96–108)
CO2 SERPL-SCNC: 23 MMOL/L — SIGNIFICANT CHANGE UP (ref 22–31)
CREAT SERPL-MCNC: 9.83 MG/DL — HIGH (ref 0.5–1.3)
GLUCOSE BLDC GLUCOMTR-MCNC: 121 MG/DL — HIGH (ref 70–99)
GLUCOSE BLDC GLUCOMTR-MCNC: 177 MG/DL — HIGH (ref 70–99)
GLUCOSE BLDC GLUCOMTR-MCNC: 192 MG/DL — HIGH (ref 70–99)
GLUCOSE BLDC GLUCOMTR-MCNC: 320 MG/DL — HIGH (ref 70–99)
GLUCOSE SERPL-MCNC: 254 MG/DL — HIGH (ref 70–99)
HCT VFR BLD CALC: 34.9 % — SIGNIFICANT CHANGE UP (ref 34.5–45)
HGB BLD-MCNC: 11.1 G/DL — LOW (ref 11.5–15.5)
MCHC RBC-ENTMCNC: 27.4 PG — SIGNIFICANT CHANGE UP (ref 27–34)
MCHC RBC-ENTMCNC: 31.7 GM/DL — LOW (ref 32–36)
MCV RBC AUTO: 86.3 FL — SIGNIFICANT CHANGE UP (ref 80–100)
PLATELET # BLD AUTO: 245 K/UL — SIGNIFICANT CHANGE UP (ref 150–400)
POTASSIUM SERPL-MCNC: 4.5 MMOL/L — SIGNIFICANT CHANGE UP (ref 3.5–5.3)
POTASSIUM SERPL-SCNC: 4.5 MMOL/L — SIGNIFICANT CHANGE UP (ref 3.5–5.3)
RBC # BLD: 4.04 M/UL — SIGNIFICANT CHANGE UP (ref 3.8–5.2)
RBC # FLD: 12.9 % — SIGNIFICANT CHANGE UP (ref 10.3–14.5)
SODIUM SERPL-SCNC: 130 MMOL/L — LOW (ref 135–145)
WBC # BLD: 10.1 K/UL — SIGNIFICANT CHANGE UP (ref 3.8–10.5)
WBC # FLD AUTO: 10.1 K/UL — SIGNIFICANT CHANGE UP (ref 3.8–10.5)

## 2019-02-10 PROCEDURE — 99231 SBSQ HOSP IP/OBS SF/LOW 25: CPT

## 2019-02-10 RX ADMIN — INSULIN GLARGINE 8 UNIT(S): 100 INJECTION, SOLUTION SUBCUTANEOUS at 22:15

## 2019-02-10 RX ADMIN — Medication 100 MILLIGRAM(S): at 17:27

## 2019-02-10 RX ADMIN — PANTOPRAZOLE SODIUM 40 MILLIGRAM(S): 20 TABLET, DELAYED RELEASE ORAL at 12:15

## 2019-02-10 RX ADMIN — Medication 1: at 12:14

## 2019-02-10 RX ADMIN — SIMVASTATIN 20 MILLIGRAM(S): 20 TABLET, FILM COATED ORAL at 22:15

## 2019-02-10 RX ADMIN — Medication 4: at 17:28

## 2019-02-10 RX ADMIN — Medication 4 UNIT(S): at 17:28

## 2019-02-10 RX ADMIN — ONDANSETRON 4 MILLIGRAM(S): 8 TABLET, FILM COATED ORAL at 12:14

## 2019-02-10 RX ADMIN — Medication 1 SPRAY(S): at 06:32

## 2019-02-10 RX ADMIN — Medication 0.5 MILLIGRAM(S): at 06:32

## 2019-02-10 RX ADMIN — Medication 1: at 22:17

## 2019-02-10 RX ADMIN — SENNA PLUS 2 TABLET(S): 8.6 TABLET ORAL at 22:15

## 2019-02-10 RX ADMIN — Medication 1334 MILLIGRAM(S): at 17:28

## 2019-02-10 RX ADMIN — Medication 1334 MILLIGRAM(S): at 09:11

## 2019-02-10 RX ADMIN — Medication 0.5 MILLIGRAM(S): at 17:27

## 2019-02-10 RX ADMIN — Medication 4 UNIT(S): at 12:15

## 2019-02-10 RX ADMIN — CHLORHEXIDINE GLUCONATE 1 APPLICATION(S): 213 SOLUTION TOPICAL at 12:17

## 2019-02-10 RX ADMIN — Medication 4 UNIT(S): at 09:12

## 2019-02-10 RX ADMIN — Medication 25 MICROGRAM(S): at 06:32

## 2019-02-10 RX ADMIN — Medication 100 MILLIGRAM(S): at 06:32

## 2019-02-10 NOTE — PROGRESS NOTE ADULT - PROBLEM SELECTOR PLAN 8
IMPROVE VTE Individual Risk Assessment          RISK                                                          Points  [  ] Previous VTE                                                3  [  ] Thrombophilia                                             2  [  ] Lower limb paralysis                                   2        (unable to hold up >15 seconds)    [  ] Current Cancer                                             2         (within 6 months)  [ x ] Immobilization > 24 hrs                              1  [  ] ICU/CCU stay > 24 hours                             1  [  ] Age > 60                                                         1    IMPROVE VTE Score: 1  Heparin sq for DVT ppx

## 2019-02-10 NOTE — PROGRESS NOTE ADULT - ASSESSMENT
Very pleasant 54yo female s/p right renal biopsy with small hematoma, hematuria      -flank pain improved  -No intervention at this time as hematoma is small and is expected after renal biopsy  -Discussed with PA staff  -Please call back with any additional questions

## 2019-02-10 NOTE — PROGRESS NOTE ADULT - SUBJECTIVE AND OBJECTIVE BOX
pt seen and examined.pts current chart reviewed and case discussed with resident covering.    SUBJECTIVE:  pt feels fine and denies cp,sob,gi or gu/uremic  symptons    REVIEW OF SYSTEMS:  CONSTITUTIONAL: No weakness, fevers or chills  EYES/ENT: No visual changes;  No vertigo or throat pain   NECK: No pain or stiffness  RESPIRATORY: No cough, wheezing, hemoptysis; No shortness of breath  CARDIOVASCULAR: No chest pain or palpitations  GASTROINTESTINAL: No abdominal or epigastric pain. No nausea, vomiting, or hematemesis; No diarrhea or constipation. No melena or hematochezia.  GENITOURINARY: No dysuria, frequency , hematuria, flank pain or nocturia  NEUROLOGICAL: No numbness or weakness  SKIN: No itching, burning, rashes, or lesions   All other review of systems is negative unless indicated above    Current meds:    amLODIPine   Tablet 5 milliGRAM(s) Oral daily  benztropine 0.5 milliGRAM(s) Oral two times a day  calcium acetate 1334 milliGRAM(s) Oral three times a day with meals  chlorhexidine 4% Liquid 1 Application(s) Topical <User Schedule>  docusate sodium 100 milliGRAM(s) Oral two times a day  epoetin seema Injectable 6000 Unit(s) SubCutaneous <User Schedule>  fluticasone propionate 50 MICROgram(s)/spray Nasal Spray 1 Spray(s) Both Nostrils two times a day  insulin glargine Injectable (LANTUS) 8 Unit(s) SubCutaneous at bedtime  insulin lispro (HumaLOG) corrective regimen sliding scale   SubCutaneous Before meals and at bedtime  insulin lispro Injectable (HumaLOG) 4 Unit(s) SubCutaneous three times a day before meals  levothyroxine 25 MICROGram(s) Oral daily  ondansetron Injectable 4 milliGRAM(s) IV Push every 6 hours PRN  pantoprazole  Injectable 40 milliGRAM(s) IV Push daily  senna 2 Tablet(s) Oral at bedtime  simvastatin 20 milliGRAM(s) Oral at bedtime      Vital Signs    T(F): 97.5 (02-10-19 @ 08:01), Max: 98.7 (02-10-19 @ 00:08)  HR: 76 (02-10-19 @ 08:01) (75 - 90)  BP: 108/56 (02-10-19 @ 08:01) (104/49 - 139/62)  ABP: --  RR: 16 (02-10-19 @ 08:01) (16 - 17)  SpO2: 100% (02-10-19 @ 08:01) (100% - 100%)  Wt(kg): --  CVP(cm H2O): --  CO: --  PCWP: --    I and O's:    Daily     Daily     PHYSICAL EXAM:  Constitutional: well developed, well nourished  and in nad  HEENT: PERRLA,  no icteric sclera and mild pallor of conjunctiva noted  Neck: No JVD, thyromegaly or adenopathy  PC noted in Rt chest  Respiratory: reduced air entry lower lungs with no rales, wheezing or rhonchi  Cardiovascular: S1 and S2 normally heard  Gastrointestinal: soft, not distended ,mild LLQ tendernss on palpation present and normal bowel sounds heard  Back: no cva tenderness or swelling noted   Extremities: No peripheral edema or cyanosis  Neurological: A/O x 3, no focal deficits  Skin: No rashes        LABS:    CBC:            BMP:    02-08    132<L>  |  94<L>  |  59<H>  ----------------------------<  246<H>  3.9   |  25  |  8.81<H>    Ca    9.7      08 Feb 2019 08:50  Phos  5.1     02-08            URINE STUDIES:                        RADIOLOGY & ADDITIONAL STUDIES: pt seen and examined.    SUBJECTIVE:  pt feels fine and denies cp,sob,gi symptons,gu symptons or uremic  symptons  pt s constipation has improved , s/p TWE     Current meds:    amLODIPine   Tablet 5 milliGRAM(s) Oral daily  benztropine 0.5 milliGRAM(s) Oral two times a day  calcium acetate 1334 milliGRAM(s) Oral three times a day with meals  chlorhexidine 4% Liquid 1 Application(s) Topical <User Schedule>  docusate sodium 100 milliGRAM(s) Oral two times a day  epoetin seema Injectable 6000 Unit(s) SubCutaneous <User Schedule>  fluticasone propionate 50 MICROgram(s)/spray Nasal Spray 1 Spray(s) Both Nostrils two times a day  insulin glargine Injectable (LANTUS) 8 Unit(s) SubCutaneous at bedtime  insulin lispro (HumaLOG) corrective regimen sliding scale   SubCutaneous Before meals and at bedtime  insulin lispro Injectable (HumaLOG) 4 Unit(s) SubCutaneous three times a day before meals  levothyroxine 25 MICROGram(s) Oral daily  ondansetron Injectable 4 milliGRAM(s) IV Push every 6 hours PRN  pantoprazole  Injectable 40 milliGRAM(s) IV Push daily  senna 2 Tablet(s) Oral at bedtime  simvastatin 20 milliGRAM(s) Oral at bedtime      Vital Signs    T(F): 97.5 (02-10-19 @ 08:01), Max: 98.7 (02-10-19 @ 00:08)  HR: 76 (02-10-19 @ 08:01) (75 - 90)  BP: 108/56 (02-10-19 @ 08:01) (104/49 - 139/62)  ABP: --  RR: 16 (02-10-19 @ 08:01) (16 - 17)  SpO2: 100% (02-10-19 @ 08:01) (100% - 100%)  Wt(kg): --  CVP(cm H2O): --  CO: --  PCWP: --    I and O's:    Daily     Daily     PHYSICAL EXAM:  Constitutional: well developed, well nourished  and in nad  HEENT: PERRLA,  no icteric sclera and mild pallor of conjunctiva noted  Neck: No JVD, thyromegaly or adenopathy  PC noted in Rt chest  Respiratory: reduced air entry lower lungs with no rales, wheezing or rhonchi  Cardiovascular: S1 and S2 normally heard  Gastrointestinal: soft, not distended  and normal bowel sounds heard  Back: no cva tenderness or swelling noted   Extremities: No peripheral edema or cyanosis  Neurological: A/O x 3, no focal deficits  Skin: No rashes        LABS:no new labs for 2 days  pt refused labs yesterday   CBC:            BMP:    02-08    132<L>  |  94<L>  |  59<H>  ----------------------------<  246<H>  3.9   |  25  |  8.81<H>    Ca    9.7      08 Feb 2019 08:50  Phos  5.1     02-08

## 2019-02-10 NOTE — PROGRESS NOTE ADULT - PROBLEM SELECTOR PLAN 1
- On new dialysis via Rt permacath    - s/p IR guided kidney biopsy on 2/6, monitor the patient closely   - Patient had few episodes of vomitus and also complained of blood in stool and urine on night of kidney biospy, CT abdomen/pelvis done: only showed trace perinephric hematoma, patient is hemodynamically stable currently.  - Urology eval noted - no intervention at this time   - H/H stable, resume aspirin and DVT ppx on 2/10 if no bleeding   - Monitor BMP  - Avoid nephrotoxic medications   - Nephro follow up  - PPD and hepatitis panel negative , social work following for setting up outpatient dialysis  - f/u Vascular before discharge to make appt for AVF/AVG placement

## 2019-02-10 NOTE — PROGRESS NOTE ADULT - SUBJECTIVE AND OBJECTIVE BOX
Patient seen/examined.  Feels well. no complaints. No events overnight    Vital Signs Last 24 Hrs  T(C): 36.4 (10 Feb 2019 08:01), Max: 37.1 (10 Feb 2019 00:08)  T(F): 97.5 (10 Feb 2019 08:01), Max: 98.7 (10 Feb 2019 00:08)  HR: 76 (10 Feb 2019 08:01) (75 - 90)  BP: 108/56 (10 Feb 2019 08:01) (104/49 - 139/62)  BP(mean): --  RR: 16 (10 Feb 2019 08:01) (16 - 17)  SpO2: 100% (10 Feb 2019 08:01) (100% - 100%)    General: NAD. AAOx3  Abd: soft. NT/ND

## 2019-02-10 NOTE — PROGRESS NOTE ADULT - PROBLEM SELECTOR PLAN 4
- Holding home oral meds , Glucophage and Glimepiride  - FS running high , insulin regimen adjusted , changed Lantus to 8 units qhs and increased Humalog pre meals to 4 units tid  - continue HSS   - Monitor accu cheks AC / HS   - hbA1c 6.8  - Discharge on Lantus 10 units qhs with accu check monitoring

## 2019-02-10 NOTE — PROGRESS NOTE ADULT - SUBJECTIVE AND OBJECTIVE BOX
Resident Note discussed with  primary attending    Patient is a 53y old  Female who presents with a chief complaint of vomiting (07 Feb 2019 17:04)      INTERVAL HPI/ OVERNIGHT EVENTS: no new complaints, vomiting resolved, denies any blood in urine or stools. Just tired and sleepy     MEDICATIONS  (STANDING):  amLODIPine   Tablet 5 milliGRAM(s) Oral daily  benztropine 0.5 milliGRAM(s) Oral two times a day  calcium acetate 1334 milliGRAM(s) Oral three times a day with meals  chlorhexidine 4% Liquid 1 Application(s) Topical <User Schedule>  docusate sodium 100 milliGRAM(s) Oral two times a day  epoetin seema Injectable 6000 Unit(s) SubCutaneous <User Schedule>  fluticasone propionate 50 MICROgram(s)/spray Nasal Spray 1 Spray(s) Both Nostrils two times a day  insulin glargine Injectable (LANTUS) 5 Unit(s) SubCutaneous at bedtime  insulin lispro (HumaLOG) corrective regimen sliding scale   SubCutaneous Before meals and at bedtime  insulin lispro Injectable (HumaLOG) 2 Unit(s) SubCutaneous three times a day before meals  levothyroxine 25 MICROGram(s) Oral daily  pantoprazole  Injectable 40 milliGRAM(s) IV Push daily  senna 2 Tablet(s) Oral at bedtime  simvastatin 20 milliGRAM(s) Oral at bedtime    MEDICATIONS  (PRN):  ondansetron Injectable 4 milliGRAM(s) IV Push every 6 hours PRN Nausea and/or Vomiting      __________________________________________________  REVIEW OF SYSTEMS:    CONSTITUTIONAL: feeling tired and sleepy   EYES: no acute visual disturbances  NECK: No pain or stiffness  RESPIRATORY: No cough; No shortness of breath  CARDIOVASCULAR: No chest pain, no palpitations  GASTROINTESTINAL: No pain. No nausea or vomiting; No diarrhea   NEUROLOGICAL: No headache or numbness, no tremors  MUSCULOSKELETAL: No joint pain, no muscle pain  GENITOURINARY: no dysuria, no frequency, no hesitancy  PSYCHIATRY: no depression , no anxiety  ALL OTHER  ROS negative        Vital Signs Last 24 Hrs  T(C): 36.4 (10 Feb 2019 08:01), Max: 37.1 (10 Feb 2019 00:08)  T(F): 97.5 (10 Feb 2019 08:01), Max: 98.7 (10 Feb 2019 00:08)  HR: 76 (10 Feb 2019 08:01) (75 - 90)  BP: 108/56 (10 Feb 2019 08:01) (104/49 - 139/62)  BP(mean): --  RR: 16 (10 Feb 2019 08:01) (16 - 17)  SpO2: 100% (10 Feb 2019 08:01) (100% - 100%)    ________________________________________________  PHYSICAL EXAM:  GENERAL: sleepy  HEENT: Normocephalic;  conjunctivae and sclerae clear; moist mucous membranes;   NECK : supple  CHEST/LUNG: Clear to auscultation bilaterally with good air entry   HEART: S1 S2  regular; no murmurs, gallops or rubs  ABDOMEN: Soft, Nontender, Nondistended; Bowel sounds present  BACK: no ecchymosis, erythema or swelling at site of renal biopsy.   EXTREMITIES: no cyanosis; no edema; no calf tenderness  NERVOUS SYSTEM:  sleepy but answering questions appropriately     _________________________________________________  LABS:                        10.2   9.1   )-----------( 206      ( 08 Feb 2019 08:50 )             31.2     02-08    132<L>  |  94<L>  |  59<H>  ----------------------------<  246<H>  3.9   |  25  |  8.81<H>    Ca    9.7      08 Feb 2019 08:50  Phos  5.1     02-08  Mg     2.2     02-07          CAPILLARY BLOOD GLUCOSE      POCT Blood Glucose.: 267 mg/dL (08 Feb 2019 08:20)  POCT Blood Glucose.: 386 mg/dL (07 Feb 2019 21:30)  POCT Blood Glucose.: 342 mg/dL (07 Feb 2019 16:57)        RADIOLOGY & ADDITIONAL TESTS:    Imaging Personally Reviewed:  YES    < from: CT Abdomen and Pelvis No Cont (02.07.19 @ 13:42) >  Impression: New small right perinephric air, likely due to recent right   renal biopsy. New trace 1.7 x 0.5 cm perinephric hemorrhage adjacent to   the lower pole of the right kidney anteriorly.    < end of copied text >      Consultant(s) Notes Reviewed:   YES    Care Discussed with Consultants : YES    Plan of care was discussed with patient and /or primary care giver; all questions and concerns were addressed and care was aligned with patient's wishes.

## 2019-02-10 NOTE — CHART NOTE - NSCHARTNOTEFT_GEN_A_CORE
Asked by nephro Dr Lemus to follow up patient's labs: cbc stable, BMP with sodium 130, potassium and bicarb stable. Will add 1000ml fluid restriction to diet as discussed with Dr Lemus.

## 2019-02-10 NOTE — PROGRESS NOTE ADULT - ASSESSMENT
A/P:  1. DEISY /ESRD: pt has chronic Tubulo-interstitial ds on renal biopsy of unclear etiology  -pt will most likely need long term chronic dialysis  -pt will need avf/avg as per vascular surgery  -pt is being dialysed now.Pt tolerating the dialysis well via Rt chest PC   -HD orders written and discussed with dialysis RN  -Keep patient euvolemic and renal diet  -Avoid Nephrotoxic Meds/ Agents such as (NSAIDs, IV contrast, Aminoglycosides such as gentamicin, -Gadolinium contrast, Phosphate containing enemas, etc..)  -Adjust Medications according to eGFR  -f/u bmp daily  2. 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 DEISY /ESRD: pt has chronic Tubulo-interstitial ds on renal biopsy of unclear etiology(biopsy results discussed with pt)  -pt will most likely need long term chronic dialysis  -pt will need avf/avg as per vascular surgery  -pt is being dialysed now.Pt tolerating the dialysis well via Rt chest PC   -HD orders written and discussed with dialysis RN  -Keep patient euvolemic and renal diet  -Avoid Nephrotoxic Meds/ Agents such as (NSAIDs, IV contrast, Aminoglycosides such as gentamicin, -Gadolinium contrast, Phosphate containing enemas, etc..)  -Adjust Medications according to eGFR  -d/w pt for blood test   to moniter her k and Hb and pt agree to have blood test now  2. 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 DEISY /ESRD: pt has chronic Tubulo-interstitial ds on renal biopsy of unclear etiology(biopsy results discussed with pt)  -pt will most likely need long term chronic dialysis  -pt will need avf/avg as per vascular surgery  -Keep patient euvolemic and renal diet  -Avoid Nephrotoxic Meds/ Agents such as (NSAIDs, IV contrast, Aminoglycosides such as gentamicin, -Gadolinium contrast, Phosphate containing enemas, etc..)  -Adjust Medications according to eGFR  -d/w pt for blood test   to moniter her k and Hb and pt agree to have blood test now  2. 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

## 2019-02-11 LAB
ALBUMIN SERPL ELPH-MCNC: 4 G/DL — SIGNIFICANT CHANGE UP (ref 3.5–5)
ALP SERPL-CCNC: 152 U/L — HIGH (ref 40–120)
ALT FLD-CCNC: 20 U/L DA — SIGNIFICANT CHANGE UP (ref 10–60)
ANION GAP SERPL CALC-SCNC: 14 MMOL/L — SIGNIFICANT CHANGE UP (ref 5–17)
AST SERPL-CCNC: 14 U/L — SIGNIFICANT CHANGE UP (ref 10–40)
BASOPHILS # BLD AUTO: 0.2 K/UL — SIGNIFICANT CHANGE UP (ref 0–0.2)
BASOPHILS NFR BLD AUTO: 2.2 % — HIGH (ref 0–2)
BILIRUB SERPL-MCNC: 0.4 MG/DL — SIGNIFICANT CHANGE UP (ref 0.2–1.2)
BUN SERPL-MCNC: 73 MG/DL — HIGH (ref 7–18)
CALCIUM SERPL-MCNC: 9.7 MG/DL — SIGNIFICANT CHANGE UP (ref 8.4–10.5)
CHLORIDE SERPL-SCNC: 91 MMOL/L — LOW (ref 96–108)
CO2 SERPL-SCNC: 26 MMOL/L — SIGNIFICANT CHANGE UP (ref 22–31)
CREAT SERPL-MCNC: 11 MG/DL — HIGH (ref 0.5–1.3)
EOSINOPHIL # BLD AUTO: 0.1 K/UL — SIGNIFICANT CHANGE UP (ref 0–0.5)
EOSINOPHIL NFR BLD AUTO: 1.5 % — SIGNIFICANT CHANGE UP (ref 0–6)
GLUCOSE BLDC GLUCOMTR-MCNC: 130 MG/DL — HIGH (ref 70–99)
GLUCOSE BLDC GLUCOMTR-MCNC: 274 MG/DL — HIGH (ref 70–99)
GLUCOSE BLDC GLUCOMTR-MCNC: 338 MG/DL — HIGH (ref 70–99)
GLUCOSE SERPL-MCNC: 211 MG/DL — HIGH (ref 70–99)
HCT VFR BLD CALC: 35.8 % — SIGNIFICANT CHANGE UP (ref 34.5–45)
HGB BLD-MCNC: 11.3 G/DL — LOW (ref 11.5–15.5)
LYMPHOCYTES # BLD AUTO: 1.3 K/UL — SIGNIFICANT CHANGE UP (ref 1–3.3)
LYMPHOCYTES # BLD AUTO: 13.7 % — SIGNIFICANT CHANGE UP (ref 13–44)
MAGNESIUM SERPL-MCNC: 2.6 MG/DL — SIGNIFICANT CHANGE UP (ref 1.6–2.6)
MCHC RBC-ENTMCNC: 27.3 PG — SIGNIFICANT CHANGE UP (ref 27–34)
MCHC RBC-ENTMCNC: 31.6 GM/DL — LOW (ref 32–36)
MCV RBC AUTO: 86.7 FL — SIGNIFICANT CHANGE UP (ref 80–100)
MONOCYTES # BLD AUTO: 0.9 K/UL — SIGNIFICANT CHANGE UP (ref 0–0.9)
MONOCYTES NFR BLD AUTO: 10 % — SIGNIFICANT CHANGE UP (ref 2–14)
NEUTROPHILS # BLD AUTO: 6.9 K/UL — SIGNIFICANT CHANGE UP (ref 1.8–7.4)
NEUTROPHILS NFR BLD AUTO: 72.7 % — SIGNIFICANT CHANGE UP (ref 43–77)
PHOSPHATE SERPL-MCNC: 6.7 MG/DL — HIGH (ref 2.5–4.5)
PLATELET # BLD AUTO: 238 K/UL — SIGNIFICANT CHANGE UP (ref 150–400)
POTASSIUM SERPL-MCNC: 4 MMOL/L — SIGNIFICANT CHANGE UP (ref 3.5–5.3)
POTASSIUM SERPL-SCNC: 4 MMOL/L — SIGNIFICANT CHANGE UP (ref 3.5–5.3)
PROT SERPL-MCNC: 9.1 G/DL — HIGH (ref 6–8.3)
RBC # BLD: 4.13 M/UL — SIGNIFICANT CHANGE UP (ref 3.8–5.2)
RBC # FLD: 13.3 % — SIGNIFICANT CHANGE UP (ref 10.3–14.5)
SODIUM SERPL-SCNC: 131 MMOL/L — LOW (ref 135–145)
WBC # BLD: 9.5 K/UL — SIGNIFICANT CHANGE UP (ref 3.8–10.5)
WBC # FLD AUTO: 9.5 K/UL — SIGNIFICANT CHANGE UP (ref 3.8–10.5)

## 2019-02-11 PROCEDURE — 99231 SBSQ HOSP IP/OBS SF/LOW 25: CPT

## 2019-02-11 RX ORDER — MIRTAZAPINE 45 MG/1
7.5 TABLET, ORALLY DISINTEGRATING ORAL DAILY
Refills: 0 | Status: DISCONTINUED | OUTPATIENT
Start: 2019-02-11 | End: 2019-02-14

## 2019-02-11 RX ADMIN — Medication 4: at 12:21

## 2019-02-11 RX ADMIN — PANTOPRAZOLE SODIUM 40 MILLIGRAM(S): 20 TABLET, DELAYED RELEASE ORAL at 17:57

## 2019-02-11 RX ADMIN — Medication 100 MILLIGRAM(S): at 05:35

## 2019-02-11 RX ADMIN — AMLODIPINE BESYLATE 5 MILLIGRAM(S): 2.5 TABLET ORAL at 05:35

## 2019-02-11 RX ADMIN — INSULIN GLARGINE 8 UNIT(S): 100 INJECTION, SOLUTION SUBCUTANEOUS at 22:16

## 2019-02-11 RX ADMIN — CHLORHEXIDINE GLUCONATE 1 APPLICATION(S): 213 SOLUTION TOPICAL at 12:20

## 2019-02-11 RX ADMIN — Medication 4 UNIT(S): at 09:14

## 2019-02-11 RX ADMIN — SENNA PLUS 2 TABLET(S): 8.6 TABLET ORAL at 22:23

## 2019-02-11 RX ADMIN — Medication 25 MICROGRAM(S): at 05:35

## 2019-02-11 RX ADMIN — Medication 1334 MILLIGRAM(S): at 09:15

## 2019-02-11 RX ADMIN — Medication 1 SPRAY(S): at 05:36

## 2019-02-11 RX ADMIN — ONDANSETRON 4 MILLIGRAM(S): 8 TABLET, FILM COATED ORAL at 18:15

## 2019-02-11 RX ADMIN — Medication 0.5 MILLIGRAM(S): at 05:35

## 2019-02-11 RX ADMIN — ONDANSETRON 4 MILLIGRAM(S): 8 TABLET, FILM COATED ORAL at 01:58

## 2019-02-11 RX ADMIN — SIMVASTATIN 20 MILLIGRAM(S): 20 TABLET, FILM COATED ORAL at 22:23

## 2019-02-11 RX ADMIN — Medication 1334 MILLIGRAM(S): at 12:22

## 2019-02-11 RX ADMIN — Medication 3: at 09:13

## 2019-02-11 RX ADMIN — Medication 4 UNIT(S): at 12:21

## 2019-02-11 NOTE — PROGRESS NOTE ADULT - PROBLEM SELECTOR PLAN 1
- On new dialysis via Rt permacath    -  kidney biopsy shows chronic changes and sclerosis    CT abdomen/pelvis done: only showed trace perinephric hematoma, patient is hemodynamically stable currently. No intervention as per urology   - f/u Vascular before discharge to make appt for AVF/AVG placement  - waiting for HD as outpatient to be set up

## 2019-02-11 NOTE — CHART NOTE - NSCHARTNOTEFT_GEN_A_CORE
Reassessment:   53yFemalePatient is a 53y old  Female who presents with a chief complaint of vomiting (2019 15:11)      Factors impacting intake: [ ] none [ ] nausea  [ ] vomiting [ ] diarrhea [ ] constipation  [ ]chewing problems [ ] swallowing issues  [X ] other:     Diet Presciption: Diet, Consistent Carbohydrate w/Evening Snack:   1000mL Fluid Restriction ([id number])  For patients receiving Renal Replacement - No Protein Restr, No Conc K, No Conc Phos, Low Sodium (RENAL) (02-10-19 @ 19:53)    Intake: Visited pt. alert but lethargic, reports eating meals but observe lunch tray intake 30% & encourage po intake, willing to have 1 can Nepro BID with meals, d/w RN & Karolina LESTER. Rec. adding Nepro 1 can BID & Nephrocaps daily with diet as ordered, RD available.     Daily Weight in k.2 (2019 13:30)  Weight in k.4 (2019 09:50)  Weight in k.8 (2019 11:52)  Weight in k.3 (2019 08:00)  Weight in k.3 (2019 20:00)  Weight in k.3 (2019 18:00)  Weight in k.9 (2019 23:04)  Weight in k.8 (2019 20:10)    % Weight Change: wt. variation noted due to HD     Pertinent Medications: MEDICATIONS  (STANDING):  benztropine 0.5 milliGRAM(s) Oral two times a day  calcium acetate 1334 milliGRAM(s) Oral three times a day with meals  chlorhexidine 4% Liquid 1 Application(s) Topical <User Schedule>  docusate sodium 100 milliGRAM(s) Oral two times a day  epoetin seema Injectable 6000 Unit(s) SubCutaneous <User Schedule>  fluticasone propionate 50 MICROgram(s)/spray Nasal Spray 1 Spray(s) Both Nostrils two times a day  insulin glargine Injectable (LANTUS) 8 Unit(s) SubCutaneous at bedtime  insulin lispro (HumaLOG) corrective regimen sliding scale   SubCutaneous Before meals and at bedtime  insulin lispro Injectable (HumaLOG) 4 Unit(s) SubCutaneous three times a day before meals  levothyroxine 25 MICROGram(s) Oral daily  pantoprazole  Injectable 40 milliGRAM(s) IV Push daily  senna 2 Tablet(s) Oral at bedtime  simvastatin 20 milliGRAM(s) Oral at bedtime    MEDICATIONS  (PRN):  ondansetron Injectable 4 milliGRAM(s) IV Push every 6 hours PRN Nausea and/or Vomiting    Pertinent Labs:  Na131 mmol/L<L> Glu 211 mg/dL<H> K+ 4.0 mmol/L Cr  11.00 mg/dL<H> BUN 73 mg/dL<H>  Phos 6.7 mg/dL<H>  Alb 4.0 g/dL  [id number] 6.8 %<H>  Chol 94 mg/dL LDL 38 mg/dL HDL 33 mg/dL<L> Trig 114 mg/dL     CAPILLARY BLOOD GLUCOSE      POCT Blood Glucose.: 338 mg/dL (2019 11:29)  POCT Blood Glucose.: 274 mg/dL (2019 09:02)  POCT Blood Glucose.: 177 mg/dL (10 Feb 2019 21:44)  POCT Blood Glucose.: 320 mg/dL (10 Feb 2019 16:55)    Skin: intact     Estimated Needs:   [X ] no change since previous assessment  [ ] recalculated:     Previous Nutrition Diagnosis:   [ ] Inadequate Energy Intake [ ]Inadequate Oral Intake [ ] Excessive Energy Intake   [ ] Underweight [ ] Increased Nutrient Needs [ ] Overweight/Obesity   [ X] Altered nutrition lab values [ ] Unintended Weight Loss [ ] Food & Nutrition Related Knowledge Deficit [ ] Malnutrition     Nutrition Diagnosis is [X ] ongoing  [ ] resolved [ ] not applicable     New Nutrition Diagnosis: [ ] not applicable       Interventions: To meet nutrition needs   Recommend  [ ] Change Diet To:  [X ] Nutrition Supplement  [ ] Nutrition Support  [ ] Other:     Monitoring and Evaluation:   [X ] PO intake [ x ] Tolerance to diet prescription [ x ] weights [ x ] labs[ x ] follow up per protocol  [ ] other:

## 2019-02-11 NOTE — PROGRESS NOTE ADULT - ASSESSMENT
A/P:  1. DEISY /ESRD: pt has chronic Tubulo-interstitial ds on renal biopsy of unclear etiology(biopsy results discussed with pt)  -pt will most likely need long term chronic dialysis  -pt will need avf/avg as per vascular surgery  -pt will be dialysed tomorrow  -Keep patient euvolemic and renal diet  -Avoid Nephrotoxic Meds/ Agents such as (NSAIDs, IV contrast, Aminoglycosides such as gentamicin, -Gadolinium contrast, Phosphate containing enemas, etc..)  -Adjust Medications according to eGFR  -d/w pt for blood test   to moniter her k and Hb and pt agree to have blood test now  2. 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

## 2019-02-11 NOTE — PROGRESS NOTE ADULT - SUBJECTIVE AND OBJECTIVE BOX
pt seen and examined.pts current chart reviewed and case discussed with resident covering.    SUBJECTIVE:  pt feels fine and denies cp,sob,gi or gu/uremic  symptons    REVIEW OF SYSTEMS:  CONSTITUTIONAL: No weakness, fevers or chills  EYES/ENT: No visual changes;  No vertigo or throat pain   NECK: No pain or stiffness  RESPIRATORY: No cough, wheezing, hemoptysis; No shortness of breath  CARDIOVASCULAR: No chest pain or palpitations  GASTROINTESTINAL: No abdominal or epigastric pain. No nausea, vomiting, or hematemesis; No diarrhea or constipation. No melena or hematochezia.  GENITOURINARY: No dysuria, frequency , hematuria, flank pain or nocturia  NEUROLOGICAL: No numbness or weakness  SKIN: No itching, burning, rashes, or lesions   All other review of systems is negative unless indicated above    Current meds:    benztropine 0.5 milliGRAM(s) Oral two times a day  calcium acetate 1334 milliGRAM(s) Oral three times a day with meals  chlorhexidine 4% Liquid 1 Application(s) Topical <User Schedule>  docusate sodium 100 milliGRAM(s) Oral two times a day  epoetin seema Injectable 6000 Unit(s) SubCutaneous <User Schedule>  fluticasone propionate 50 MICROgram(s)/spray Nasal Spray 1 Spray(s) Both Nostrils two times a day  insulin glargine Injectable (LANTUS) 8 Unit(s) SubCutaneous at bedtime  insulin lispro (HumaLOG) corrective regimen sliding scale   SubCutaneous Before meals and at bedtime  insulin lispro Injectable (HumaLOG) 4 Unit(s) SubCutaneous three times a day before meals  levothyroxine 25 MICROGram(s) Oral daily  ondansetron Injectable 4 milliGRAM(s) IV Push every 6 hours PRN  pantoprazole  Injectable 40 milliGRAM(s) IV Push daily  senna 2 Tablet(s) Oral at bedtime  simvastatin 20 milliGRAM(s) Oral at bedtime      Vital Signs    T(F): 99 (02-11-19 @ 09:20), Max: 99.5 (02-11-19 @ 00:07)  HR: 79 (02-11-19 @ 09:20) (79 - 90)  BP: 98/70 (02-11-19 @ 09:20) (98/70 - 139/62)  ABP: --  RR: 17 (02-11-19 @ 09:20) (16 - 17)  SpO2: 100% (02-11-19 @ 09:20) (100% - 100%)  Wt(kg): --  CVP(cm H2O): --  CO: --  PCWP: --    I and O's:    Daily     Daily     PHYSICAL EXAM:  Constitutional: well developed, well nourished  and in nad  HEENT: PERRLA,  no icteric sclera and mild pallor of conjunctiva noted  Neck: No JVD, thyromegaly or adenopathy  Respiratory: reduced air entry lower lungs with no rales, wheezing or rhonchi  Cardiovascular: S1 and S2 normally heard  Gastrointestinal: soft, nondistended, nontender and normal bowel sounds heard  Extremities: No peripheral edema or cyanosis  Neurological: A/O x 3, no focal deficits  : No flank or cva tenderness palpated.  Skin: No rashes      LABS:    CBC:                          11.3   9.5   )-----------( 238      ( 11 Feb 2019 05:26 )             35.8           BMP:    02-11    131<L>  |  91<L>  |  73<H>  ----------------------------<  211<H>  4.0   |  26  |  11.00<H>  02-10    130<L>  |  92<L>  |  64<H>  ----------------------------<  254<H>  4.5   |  23  |  9.83<H>    Ca    9.7      11 Feb 2019 05:26  Ca    9.8      10 Feb 2019 18:31  Phos  6.7     02-11  Mg     2.6     02-11    TPro  9.1<H>  /  Alb  4.0  /  TBili  0.4  /  DBili  x   /  AST  14  /  ALT  20  /  AlkPhos  152<H>  02-11          URINE STUDIES:                        RADIOLOGY & ADDITIONAL STUDIES: pt seen and examined.    SUBJECTIVE:  pt feels very weak and tired   she denies cp,sob ,gi symptons or uremic symptons like itching        Current meds:    benztropine 0.5 milliGRAM(s) Oral two times a day  calcium acetate 1334 milliGRAM(s) Oral three times a day with meals  chlorhexidine 4% Liquid 1 Application(s) Topical <User Schedule>  docusate sodium 100 milliGRAM(s) Oral two times a day  epoetin seema Injectable 6000 Unit(s) SubCutaneous <User Schedule>  fluticasone propionate 50 MICROgram(s)/spray Nasal Spray 1 Spray(s) Both Nostrils two times a day  insulin glargine Injectable (LANTUS) 8 Unit(s) SubCutaneous at bedtime  insulin lispro (HumaLOG) corrective regimen sliding scale   SubCutaneous Before meals and at bedtime  insulin lispro Injectable (HumaLOG) 4 Unit(s) SubCutaneous three times a day before meals  levothyroxine 25 MICROGram(s) Oral daily  ondansetron Injectable 4 milliGRAM(s) IV Push every 6 hours PRN  pantoprazole  Injectable 40 milliGRAM(s) IV Push daily  senna 2 Tablet(s) Oral at bedtime  simvastatin 20 milliGRAM(s) Oral at bedtime      Vital Signs    T(F): 99 (02-11-19 @ 09:20), Max: 99.5 (02-11-19 @ 00:07)  HR: 79 (02-11-19 @ 09:20) (79 - 90)  BP: 98/70 (02-11-19 @ 09:20) (98/70 - 139/62)  ABP: --  RR: 17 (02-11-19 @ 09:20) (16 - 17)  SpO2: 100% (02-11-19 @ 09:20) (100% - 100%)  Wt(kg): --  CVP(cm H2O): --  CO: --  PCWP: --    I and O's:    Daily     Daily     PHYSICAL EXAM:  Constitutional: well developed, well nourished  and in nad  HEENT: PERRLA,  no icteric sclera and mild pallor of conjunctiva noted  Neck: No JVD, thyromegaly or adenopathy  PC noted in Rt chest  Respiratory: reduced air entry lower lungs with no rales, wheezing or rhonchi  Cardiovascular: S1 and S2 normally heard  Gastrointestinal: soft, not distended  and normal bowel sounds heard  Back: no cva tenderness or swelling noted   Extremities: No peripheral edema or cyanosis  Neurological: A/O x 3, no focal deficits  Skin: No rashes    LABS:    CBC:                          11.3   9.5   )-----------( 238      ( 11 Feb 2019 05:26 )             35.8       BMP:    02-11    131<L>  |  91<L>  |  73<H>  ----------------------------<  211<H>  4.0   |  26  |  11.00<H>  02-10    130<L>  |  92<L>  |  64<H>  ----------------------------<  254<H>  4.5   |  23  |  9.83<H>    Ca    9.7      11 Feb 2019 05:26  Ca    9.8      10 Feb 2019 18:31  Phos  6.7     02-11  Mg     2.6     02-11    TPro  9.1<H>  /  Alb  4.0  /  TBili  0.4  /  DBili  x   /  AST  14  /  ALT  20  /  AlkPhos  152<H>  02-11

## 2019-02-11 NOTE — PROGRESS NOTE ADULT - ASSESSMENT
Patient is a 53y old  Female who presents with a chief complaint of vomiting. Found to be in ARF 2/2 worsening CKD. Pt had kidney biopsy which came back with sclerosis. After biopsy pt had right kidney hematoma but no intervention needed. Now on HD. Pt also to have anemia of chronic disease 2/2 CKD. Started on epogen now anemia improved.

## 2019-02-11 NOTE — PROGRESS NOTE ADULT - SUBJECTIVE AND OBJECTIVE BOX
Patient seen/examined.  Creatinine continues to rise. No flank pain.    Vital Signs Last 24 Hrs  T(C): 37.2 (11 Feb 2019 09:20), Max: 37.5 (11 Feb 2019 00:07)  T(F): 99 (11 Feb 2019 09:20), Max: 99.5 (11 Feb 2019 00:07)  HR: 79 (11 Feb 2019 09:20) (79 - 90)  BP: 98/70 (11 Feb 2019 09:20) (98/70 - 139/62)  BP(mean): --  RR: 17 (11 Feb 2019 09:20) (16 - 17)  SpO2: 100% (11 Feb 2019 09:20) (100% - 100%)    General: NAD. AAOx3  Abd: soft. NT/ND                          11.3   9.5   )-----------( 238      ( 11 Feb 2019 05:26 )             35.8     02-11    131<L>  |  91<L>  |  73<H>  ----------------------------<  211<H>  4.0   |  26  |  11.00<H>    Ca    9.7      11 Feb 2019 05:26  Phos  6.7     02-11  Mg     2.6     02-11    TPro  9.1<H>  /  Alb  4.0  /  TBili  0.4  /  DBili  x   /  AST  14  /  ALT  20  /  AlkPhos  152<H>  02-11

## 2019-02-12 LAB
ANION GAP SERPL CALC-SCNC: 15 MMOL/L — SIGNIFICANT CHANGE UP (ref 5–17)
BUN SERPL-MCNC: 94 MG/DL — HIGH (ref 7–18)
CALCIUM SERPL-MCNC: 10.1 MG/DL — SIGNIFICANT CHANGE UP (ref 8.4–10.5)
CHLORIDE SERPL-SCNC: 88 MMOL/L — LOW (ref 96–108)
CO2 SERPL-SCNC: 24 MMOL/L — SIGNIFICANT CHANGE UP (ref 22–31)
CREAT SERPL-MCNC: 13.2 MG/DL — HIGH (ref 0.5–1.3)
FOLATE SERPL-MCNC: 17 NG/ML — SIGNIFICANT CHANGE UP
GLUCOSE BLDC GLUCOMTR-MCNC: 100 MG/DL — HIGH (ref 70–99)
GLUCOSE BLDC GLUCOMTR-MCNC: 127 MG/DL — HIGH (ref 70–99)
GLUCOSE BLDC GLUCOMTR-MCNC: 181 MG/DL — HIGH (ref 70–99)
GLUCOSE BLDC GLUCOMTR-MCNC: 192 MG/DL — HIGH (ref 70–99)
GLUCOSE SERPL-MCNC: 177 MG/DL — HIGH (ref 70–99)
POTASSIUM SERPL-MCNC: 4.4 MMOL/L — SIGNIFICANT CHANGE UP (ref 3.5–5.3)
POTASSIUM SERPL-SCNC: 4.4 MMOL/L — SIGNIFICANT CHANGE UP (ref 3.5–5.3)
SODIUM SERPL-SCNC: 127 MMOL/L — LOW (ref 135–145)
VIT B12 SERPL-MCNC: 857 PG/ML — SIGNIFICANT CHANGE UP (ref 232–1245)

## 2019-02-12 RX ORDER — SODIUM CHLORIDE 9 MG/ML
1000 INJECTION INTRAMUSCULAR; INTRAVENOUS; SUBCUTANEOUS
Qty: 0 | Refills: 0 | Status: DISCONTINUED | OUTPATIENT
Start: 2019-02-12 | End: 2019-02-14

## 2019-02-12 RX ADMIN — SODIUM CHLORIDE 50 MILLILITER(S): 9 INJECTION INTRAMUSCULAR; INTRAVENOUS; SUBCUTANEOUS at 20:38

## 2019-02-12 RX ADMIN — Medication 100 MILLIGRAM(S): at 17:19

## 2019-02-12 RX ADMIN — Medication 1 SPRAY(S): at 06:00

## 2019-02-12 RX ADMIN — Medication 25 MICROGRAM(S): at 05:59

## 2019-02-12 RX ADMIN — SENNA PLUS 2 TABLET(S): 8.6 TABLET ORAL at 21:54

## 2019-02-12 RX ADMIN — Medication 1334 MILLIGRAM(S): at 11:55

## 2019-02-12 RX ADMIN — Medication 1: at 08:48

## 2019-02-12 RX ADMIN — Medication 1 TABLET(S): at 11:55

## 2019-02-12 RX ADMIN — Medication 1334 MILLIGRAM(S): at 17:19

## 2019-02-12 RX ADMIN — Medication 100 MILLIGRAM(S): at 06:00

## 2019-02-12 RX ADMIN — PANTOPRAZOLE SODIUM 40 MILLIGRAM(S): 20 TABLET, DELAYED RELEASE ORAL at 11:55

## 2019-02-12 RX ADMIN — Medication 0.5 MILLIGRAM(S): at 17:19

## 2019-02-12 RX ADMIN — Medication 4 UNIT(S): at 12:07

## 2019-02-12 RX ADMIN — Medication 1 SPRAY(S): at 17:19

## 2019-02-12 RX ADMIN — MIRTAZAPINE 7.5 MILLIGRAM(S): 45 TABLET, ORALLY DISINTEGRATING ORAL at 11:55

## 2019-02-12 RX ADMIN — Medication 0.5 MILLIGRAM(S): at 06:00

## 2019-02-12 RX ADMIN — Medication 1: at 12:06

## 2019-02-12 RX ADMIN — Medication 4 UNIT(S): at 08:48

## 2019-02-12 RX ADMIN — INSULIN GLARGINE 8 UNIT(S): 100 INJECTION, SOLUTION SUBCUTANEOUS at 21:54

## 2019-02-12 RX ADMIN — CHLORHEXIDINE GLUCONATE 1 APPLICATION(S): 213 SOLUTION TOPICAL at 12:06

## 2019-02-12 RX ADMIN — ERYTHROPOIETIN 6000 UNIT(S): 10000 INJECTION, SOLUTION INTRAVENOUS; SUBCUTANEOUS at 13:42

## 2019-02-12 RX ADMIN — SIMVASTATIN 20 MILLIGRAM(S): 20 TABLET, FILM COATED ORAL at 21:54

## 2019-02-12 NOTE — PROGRESS NOTE ADULT - SUBJECTIVE AND OBJECTIVE BOX
Patient is a 53y old  Female who presents with a chief complaint of vomiting. Found to be in ARF 2/2 worsening CKD. Pt had kidney biopsy which came back with sclerosis. After biopsy pt had right kidney hematoma but no intervention needed. Now on HD. Pt also to have anemia of chronic disease 2/2 CKD. Started on epogen now anemia improved.       INTERVAL HPI/OVERNIGHT EVENTS:    still complaints of generalized weakness     MEDICATIONS  (STANDING):  benztropine 0.5 milliGRAM(s) Oral two times a day  calcium acetate 1334 milliGRAM(s) Oral three times a day with meals  chlorhexidine 4% Liquid 1 Application(s) Topical <User Schedule>  docusate sodium 100 milliGRAM(s) Oral two times a day  epoetin seema Injectable 6000 Unit(s) SubCutaneous <User Schedule>  fluticasone propionate 50 MICROgram(s)/spray Nasal Spray 1 Spray(s) Both Nostrils two times a day  insulin glargine Injectable (LANTUS) 8 Unit(s) SubCutaneous at bedtime  insulin lispro (HumaLOG) corrective regimen sliding scale   SubCutaneous Before meals and at bedtime  insulin lispro Injectable (HumaLOG) 4 Unit(s) SubCutaneous three times a day before meals  levothyroxine 25 MICROGram(s) Oral daily  mirtazapine 7.5 milliGRAM(s) Oral daily  Nephro-nasim 1 Tablet(s) Oral daily  pantoprazole  Injectable 40 milliGRAM(s) IV Push daily  senna 2 Tablet(s) Oral at bedtime  simvastatin 20 milliGRAM(s) Oral at bedtime    MEDICATIONS  (PRN):  ondansetron Injectable 4 milliGRAM(s) IV Push every 6 hours PRN Nausea and/or Vomiting      Allergies    No Known Allergies    Intolerances        Vital Signs Last 24 Hrs  T(C): 36.9 (12 Feb 2019 08:26), Max: 36.9 (12 Feb 2019 08:26)  T(F): 98.4 (12 Feb 2019 08:26), Max: 98.4 (12 Feb 2019 08:26)  HR: 91 (12 Feb 2019 08:26) (79 - 91)  BP: 119/67 (12 Feb 2019 08:26) (119/67 - 137/82)  BP(mean): --  RR: 16 (12 Feb 2019 08:26) (16 - 18)  SpO2: 100% (12 Feb 2019 08:26) (96% - 100%)      PHYSICAL EXAM:  GENERAL: NAD  HEENT: Supple, No JVD, Normal thyroid  NERVOUS SYSTEM:  Alert & Oriented X2,   CHEST/LUNG: Clear to percussion bilaterally; No rales, rhonchi, wheezing, or rubs. R perm carth   HEART: Regular rate and rhythm; No murmurs, rubs, or gallops  ABDOMEN: Soft, Nontender, Nondistended; Bowel sounds present  EXTREMITIES:  2+ Peripheral Pulses, No clubbing, cyanosis, or edema  SKIN: no rashes      LABS:                        11.3   9.5   )-----------( 238      ( 11 Feb 2019 05:26 )             35.8     02-11    131<L>  |  91<L>  |  73<H>  ----------------------------<  211<H>  4.0   |  26  |  11.00<H>    Ca    9.7      11 Feb 2019 05:26  Phos  6.7     02-11  Mg     2.6     02-11    TPro  9.1<H>  /  Alb  4.0  /  TBili  0.4  /  DBili  x   /  AST  14  /  ALT  20  /  AlkPhos  152<H>  02-11        CAPILLARY BLOOD GLUCOSE      POCT Blood Glucose.: 181 mg/dL (12 Feb 2019 08:42)  POCT Blood Glucose.: 130 mg/dL (11 Feb 2019 21:18)  POCT Blood Glucose.: 207 mg/dL (11 Feb 2019 17:38)  POCT Blood Glucose.: 338 mg/dL (11 Feb 2019 11:29)      RADIOLOGY & ADDITIONAL TESTS:

## 2019-02-12 NOTE — PROGRESS NOTE ADULT - SUBJECTIVE AND OBJECTIVE BOX
pt seen and examined.pts current chart reviewed and case discussed with resident covering.    SUBJECTIVE:  pt feels fine and denies cp,sob,gi or gu/uremic  symptons    REVIEW OF SYSTEMS:  CONSTITUTIONAL: No weakness, fevers or chills  EYES/ENT: No visual changes;  No vertigo or throat pain   NECK: No pain or stiffness  RESPIRATORY: No cough, wheezing, hemoptysis; No shortness of breath  CARDIOVASCULAR: No chest pain or palpitations  GASTROINTESTINAL: No abdominal or epigastric pain. No nausea, vomiting, or hematemesis; No diarrhea or constipation. No melena or hematochezia.  GENITOURINARY: No dysuria, frequency , hematuria, flank pain or nocturia  NEUROLOGICAL: No numbness or weakness  SKIN: No itching, burning, rashes, or lesions   All other review of systems is negative unless indicated above    Current meds:    benztropine 0.5 milliGRAM(s) Oral two times a day  calcium acetate 1334 milliGRAM(s) Oral three times a day with meals  chlorhexidine 4% Liquid 1 Application(s) Topical <User Schedule>  docusate sodium 100 milliGRAM(s) Oral two times a day  epoetin seema Injectable 6000 Unit(s) SubCutaneous <User Schedule>  fluticasone propionate 50 MICROgram(s)/spray Nasal Spray 1 Spray(s) Both Nostrils two times a day  insulin glargine Injectable (LANTUS) 8 Unit(s) SubCutaneous at bedtime  insulin lispro (HumaLOG) corrective regimen sliding scale   SubCutaneous Before meals and at bedtime  insulin lispro Injectable (HumaLOG) 4 Unit(s) SubCutaneous three times a day before meals  levothyroxine 25 MICROGram(s) Oral daily  mirtazapine 7.5 milliGRAM(s) Oral daily  Nephro-nasim 1 Tablet(s) Oral daily  ondansetron Injectable 4 milliGRAM(s) IV Push every 6 hours PRN  pantoprazole  Injectable 40 milliGRAM(s) IV Push daily  senna 2 Tablet(s) Oral at bedtime  simvastatin 20 milliGRAM(s) Oral at bedtime      Vital Signs    T(F): 98.4 (02-12-19 @ 08:26), Max: 98.4 (02-12-19 @ 08:26)  HR: 91 (02-12-19 @ 08:26) (79 - 91)  BP: 119/67 (02-12-19 @ 08:26) (119/67 - 137/82)  ABP: --  RR: 16 (02-12-19 @ 08:26) (16 - 18)  SpO2: 100% (02-12-19 @ 08:26) (96% - 100%)  Wt(kg): --  CVP(cm H2O): --  CO: --  PCWP: --    I and O's:    Daily     Daily     PHYSICAL EXAM:  Constitutional: well developed, well nourished  and in nad  HEENT: PERRLA,  no icteric sclera and mild pallor of conjunctiva noted  Neck: No JVD, thyromegaly or adenopathy  PC noted in Rt chest  Respiratory: reduced air entry lower lungs with no rales, wheezing or rhonchi  Cardiovascular: S1 and S2 normally heard  Gastrointestinal: soft, not distended  and normal bowel sounds heard  Back: no cva tenderness or swelling noted   Extremities: No peripheral edema or cyanosis  Neurological: A/O x 3, no focal deficits  Skin: No rashes        LABS:    CBC:                          11.3   9.5   )-----------( 238      ( 11 Feb 2019 05:26 )             35.8           BMP:    02-11    131<L>  |  91<L>  |  73<H>  ----------------------------<  211<H>  4.0   |  26  |  11.00<H>  02-10    130<L>  |  92<L>  |  64<H>  ----------------------------<  254<H>  4.5   |  23  |  9.83<H>    Ca    9.7      11 Feb 2019 05:26  Ca    9.8      10 Feb 2019 18:31  Phos  6.7     02-11  Mg     2.6     02-11    TPro  9.1<H>  /  Alb  4.0  /  TBili  0.4  /  DBili  x   /  AST  14  /  ALT  20  /  AlkPhos  152<H>  02-11          URINE STUDIES:                        RADIOLOGY & ADDITIONAL STUDIES: pt seen and examined.    SUBJECTIVE:  pt seen in dialysis.pt is awake ,alert but  always sleeping and very weak  she denies cp or sob  pt is mildly hypotensive during dialysis and iv saline bolus was given and uf was d/choco during dialysis      Current meds:    benztropine 0.5 milliGRAM(s) Oral two times a day  calcium acetate 1334 milliGRAM(s) Oral three times a day with meals  chlorhexidine 4% Liquid 1 Application(s) Topical <User Schedule>  docusate sodium 100 milliGRAM(s) Oral two times a day  epoetin seema Injectable 6000 Unit(s) SubCutaneous <User Schedule>  fluticasone propionate 50 MICROgram(s)/spray Nasal Spray 1 Spray(s) Both Nostrils two times a day  insulin glargine Injectable (LANTUS) 8 Unit(s) SubCutaneous at bedtime  insulin lispro (HumaLOG) corrective regimen sliding scale   SubCutaneous Before meals and at bedtime  insulin lispro Injectable (HumaLOG) 4 Unit(s) SubCutaneous three times a day before meals  levothyroxine 25 MICROGram(s) Oral daily  mirtazapine 7.5 milliGRAM(s) Oral daily  Nephro-nasim 1 Tablet(s) Oral daily  ondansetron Injectable 4 milliGRAM(s) IV Push every 6 hours PRN  pantoprazole  Injectable 40 milliGRAM(s) IV Push daily  senna 2 Tablet(s) Oral at bedtime  simvastatin 20 milliGRAM(s) Oral at bedtime      Vital Signs    T(F): 98.4 (02-12-19 @ 08:26), Max: 98.4 (02-12-19 @ 08:26)  HR: 91 (02-12-19 @ 08:26) (79 - 91)  BP: 119/67 (02-12-19 @ 08:26) (119/67 - 137/82)  ABP: --  RR: 16 (02-12-19 @ 08:26) (16 - 18)  SpO2: 100% (02-12-19 @ 08:26) (96% - 100%)  Wt(kg): --  CVP(cm H2O): --  CO: --  PCWP: --    I and O's:    Daily     Daily     PHYSICAL EXAM:  Constitutional: well developed, well nourished  and in nad  HEENT: PERRLA,  no icteric sclera and mild pallor of conjunctiva noted  Neck: No JVD, thyromegaly or adenopathy  PC noted in Rt chest  Respiratory: reduced air entry lower lungs with no rales, wheezing or rhonchi  Cardiovascular: S1 and S2 normally heard  Gastrointestinal: soft, not distended  and normal bowel sounds heard  Back: no cva tenderness or swelling noted   Extremities: No peripheral edema or cyanosis  Neurological: A/O x 3, no focal deficits  Skin: No rashes        LABS:    CBC:                          11.3   9.5   )-----------( 238      ( 11 Feb 2019 05:26 )             35.8           BMP:  Basic Metabolic Panel in AM (02.12.19 @ 13:38)    Sodium, Serum: 127 mmol/L  pre -dialysis    Potassium, Serum: 4.4 mmol/L    Chloride, Serum: 88 mmol/L    Carbon Dioxide, Serum: 24 mmol/L    Anion Gap, Serum: 15 mmol/L    Blood Urea Nitrogen, Serum: 94 mg/dL    Creatinine, Serum: 13.20 mg/dL    Glucose, Serum: 177 mg/dL    Calcium, Total Serum: 10.1 mg/dL    eGFR if Non : 3: Interpretative comment  in patients with unstable creatinine levels. mL/min/1.73M2    eGFR if African American: 3 mL/min/1.73M2      02-11    131<L>  |  91<L>  |  73<H>  ----------------------------<  211<H>  4.0   |  26  |  11.00<H>  02-10    130<L>  |  92<L>  |  64<H>  ----------------------------<  254<H>  4.5   |  23  |  9.83<H>    Ca    9.7      11 Feb 2019 05:26  Ca    9.8      10 Feb 2019 18:31  Phos  6.7     02-11  Mg     2.6     02-11    TPro  9.1<H>  /  Alb  4.0  /  TBili  0.4  /  DBili  x   /  AST  14  /  ALT  20  /  AlkPhos  152<H>  02-11

## 2019-02-12 NOTE — PROGRESS NOTE ADULT - PROBLEM SELECTOR PLAN 1
- On new dialysis via Rt permacath    -  kidney biopsy shows chronic changes and sclerosis   perinephric hematoma--> no intervention as per urology   - f/u Vascular before discharge to make appt for AVF/AVG placement  -pt complaints of generalized weakness--> pt is hypoactive, pt was encourage to walk. OBT.  Vitamin were started. f/u B12 and folate. pt might need mor HD since weakness could be 2/2 elevated BUN  -waiting for HD as outpatient to be set up.

## 2019-02-12 NOTE — PROGRESS NOTE ADULT - ASSESSMENT
A/P:  1. DEISY /ESRD: pt has chronic Tubulo-interstitial ds on renal biopsy of unclear etiology(biopsy results discussed with pt)  -pt will most likely need long term chronic dialysis  -pt will need avf/avg as per vascular surgery  -pt will be dialysed tomorrow  -Keep patient euvolemic and renal diet  -Avoid Nephrotoxic Meds/ Agents such as (NSAIDs, IV contrast, Aminoglycosides such as gentamicin, -Gadolinium contrast, Phosphate containing enemas, etc..)  -Adjust Medications according to eGFR  -d/w pt for blood test   to moniter her k and Hb and pt agree to have blood test now  2. 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 DEISY /ESRD: pt has chronic Tubulo-interstitial ds on renal biopsy of unclear etiology(biopsy results discussed with pt)  -pt will need long term chronic dialysis  -pt will need avf/avg as per vascular surgery  -pt is being dialysed now.Hd orders written and discussed with dialysis RN  -we will plan for hd again tomorrow as pts cr is very high and pt needs more dialysis  -Keep patient euvolemic and renal diet  -Avoid Nephrotoxic Meds/ Agents such as (NSAIDs, IV contrast, Aminoglycosides such as gentamicin, -Gadolinium contrast, Phosphate containing enemas, etc..)  -Adjust Medications according to eGFR  -d/w pt for blood test   to moniter her k and Hb and pt agree to have blood test now  2. 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

## 2019-02-13 LAB
ANION GAP SERPL CALC-SCNC: 14 MMOL/L — SIGNIFICANT CHANGE UP (ref 5–17)
BUN SERPL-MCNC: 49 MG/DL — HIGH (ref 7–18)
CALCIUM SERPL-MCNC: 9.2 MG/DL — SIGNIFICANT CHANGE UP (ref 8.4–10.5)
CHLORIDE SERPL-SCNC: 96 MMOL/L — SIGNIFICANT CHANGE UP (ref 96–108)
CO2 SERPL-SCNC: 24 MMOL/L — SIGNIFICANT CHANGE UP (ref 22–31)
CREAT SERPL-MCNC: 8.73 MG/DL — HIGH (ref 0.5–1.3)
GLUCOSE BLDC GLUCOMTR-MCNC: 103 MG/DL — HIGH (ref 70–99)
GLUCOSE BLDC GLUCOMTR-MCNC: 126 MG/DL — HIGH (ref 70–99)
GLUCOSE BLDC GLUCOMTR-MCNC: 160 MG/DL — HIGH (ref 70–99)
GLUCOSE BLDC GLUCOMTR-MCNC: 95 MG/DL — SIGNIFICANT CHANGE UP (ref 70–99)
GLUCOSE SERPL-MCNC: 114 MG/DL — HIGH (ref 70–99)
POTASSIUM SERPL-MCNC: 4.2 MMOL/L — SIGNIFICANT CHANGE UP (ref 3.5–5.3)
POTASSIUM SERPL-SCNC: 4.2 MMOL/L — SIGNIFICANT CHANGE UP (ref 3.5–5.3)
SODIUM SERPL-SCNC: 134 MMOL/L — LOW (ref 135–145)

## 2019-02-13 RX ORDER — GLIMEPIRIDE 1 MG
1 TABLET ORAL
Qty: 0 | Refills: 0 | COMMUNITY

## 2019-02-13 RX ORDER — FLUTICASONE PROPIONATE 50 MCG
1 SPRAY, SUSPENSION NASAL
Qty: 0 | Refills: 0 | DISCHARGE
Start: 2019-02-13

## 2019-02-13 RX ORDER — METFORMIN HYDROCHLORIDE 850 MG/1
1 TABLET ORAL
Qty: 0 | Refills: 0 | COMMUNITY

## 2019-02-13 RX ORDER — PANTOPRAZOLE SODIUM 20 MG/1
40 TABLET, DELAYED RELEASE ORAL
Qty: 0 | Refills: 0 | DISCHARGE
Start: 2019-02-13

## 2019-02-13 RX ORDER — ERYTHROPOIETIN 10000 [IU]/ML
6000 INJECTION, SOLUTION INTRAVENOUS; SUBCUTANEOUS
Qty: 1 | Refills: 0
Start: 2019-02-13 | End: 2019-03-14

## 2019-02-13 RX ORDER — DOCUSATE SODIUM 100 MG
1 CAPSULE ORAL
Qty: 0 | Refills: 0 | DISCHARGE
Start: 2019-02-13

## 2019-02-13 RX ORDER — INSULIN LISPRO 100/ML
4 VIAL (ML) SUBCUTANEOUS
Qty: 1 | Refills: 0
Start: 2019-02-13 | End: 2019-03-14

## 2019-02-13 RX ORDER — CALCIUM ACETATE 667 MG
1 TABLET ORAL
Qty: 117 | Refills: 0
Start: 2019-02-13 | End: 2019-03-23

## 2019-02-13 RX ORDER — PALIPERIDONE 1.5 MG/1
0 TABLET, EXTENDED RELEASE ORAL
Qty: 0 | Refills: 0 | COMMUNITY

## 2019-02-13 RX ORDER — MIRTAZAPINE 45 MG/1
1 TABLET, ORALLY DISINTEGRATING ORAL
Qty: 30 | Refills: 0
Start: 2019-02-13 | End: 2019-03-14

## 2019-02-13 RX ORDER — INSULIN GLARGINE 100 [IU]/ML
8 INJECTION, SOLUTION SUBCUTANEOUS
Qty: 1 | Refills: 0
Start: 2019-02-13 | End: 2019-03-14

## 2019-02-13 RX ADMIN — Medication 4 UNIT(S): at 08:52

## 2019-02-13 RX ADMIN — Medication 25 MICROGRAM(S): at 05:44

## 2019-02-13 RX ADMIN — Medication 1 TABLET(S): at 11:54

## 2019-02-13 RX ADMIN — MIRTAZAPINE 7.5 MILLIGRAM(S): 45 TABLET, ORALLY DISINTEGRATING ORAL at 11:54

## 2019-02-13 RX ADMIN — CHLORHEXIDINE GLUCONATE 1 APPLICATION(S): 213 SOLUTION TOPICAL at 12:03

## 2019-02-13 RX ADMIN — Medication 1: at 08:52

## 2019-02-13 RX ADMIN — SENNA PLUS 2 TABLET(S): 8.6 TABLET ORAL at 22:36

## 2019-02-13 RX ADMIN — Medication 0: at 22:36

## 2019-02-13 RX ADMIN — Medication 100 MILLIGRAM(S): at 05:44

## 2019-02-13 RX ADMIN — Medication 1334 MILLIGRAM(S): at 22:35

## 2019-02-13 RX ADMIN — Medication 1334 MILLIGRAM(S): at 11:54

## 2019-02-13 RX ADMIN — SIMVASTATIN 20 MILLIGRAM(S): 20 TABLET, FILM COATED ORAL at 22:36

## 2019-02-13 RX ADMIN — Medication 4 UNIT(S): at 12:25

## 2019-02-13 RX ADMIN — Medication 1 SPRAY(S): at 05:44

## 2019-02-13 RX ADMIN — Medication 1334 MILLIGRAM(S): at 08:51

## 2019-02-13 RX ADMIN — PANTOPRAZOLE SODIUM 40 MILLIGRAM(S): 20 TABLET, DELAYED RELEASE ORAL at 11:54

## 2019-02-13 RX ADMIN — Medication 0.5 MILLIGRAM(S): at 22:36

## 2019-02-13 RX ADMIN — INSULIN GLARGINE 8 UNIT(S): 100 INJECTION, SOLUTION SUBCUTANEOUS at 22:36

## 2019-02-13 RX ADMIN — Medication 0.5 MILLIGRAM(S): at 05:44

## 2019-02-13 NOTE — PROGRESS NOTE ADULT - PROBLEM SELECTOR PLAN 5
- c/w statin
- c/w synthroid at home dose  - TSH wnl
- c/w statin
- c/w synthroid at home dose  - TSH wnl
- c/w statin
- c/w synthroid at home dose  - TSH wnl

## 2019-02-13 NOTE — PROGRESS NOTE ADULT - PROBLEM SELECTOR PLAN 1
- kidney biopsy shows chronic changes and sclerosis   -perinephric hematoma--> no intervention as per urology   - f/u Vascular before discharge to make appt for AVF/AVG placement  - pt sleepy most of the time with no much appetite, vitamin WNL--> c/w mirtazapine for mood and improve appetite. c/w nephro nasim . Will get PT follow up   -waiting for HD as outpatient to be set up.

## 2019-02-13 NOTE — PROGRESS NOTE ADULT - SUBJECTIVE AND OBJECTIVE BOX
Patient is a 53y old  Female who presents with a chief complaint of vomiting. Found to be in ARF 2/2 worsening CKD. Pt had kidney biopsy which came back with sclerosis. After biopsy pt had right kidney hematoma but no intervention needed. Now on HD. Pt also to have anemia of chronic disease 2/2 CKD. Started on epogen now anemia improved.     INTERVAL HPI/OVERNIGHT EVENTS:    no events, pt more energetic today     MEDICATIONS  (STANDING):  benztropine 0.5 milliGRAM(s) Oral two times a day  calcium acetate 1334 milliGRAM(s) Oral three times a day with meals  chlorhexidine 4% Liquid 1 Application(s) Topical <User Schedule>  docusate sodium 100 milliGRAM(s) Oral two times a day  epoetin seema Injectable 6000 Unit(s) SubCutaneous <User Schedule>  fluticasone propionate 50 MICROgram(s)/spray Nasal Spray 1 Spray(s) Both Nostrils two times a day  insulin glargine Injectable (LANTUS) 8 Unit(s) SubCutaneous at bedtime  insulin lispro (HumaLOG) corrective regimen sliding scale   SubCutaneous Before meals and at bedtime  insulin lispro Injectable (HumaLOG) 4 Unit(s) SubCutaneous three times a day before meals  levothyroxine 25 MICROGram(s) Oral daily  mirtazapine 7.5 milliGRAM(s) Oral daily  Nephro-nasim 1 Tablet(s) Oral daily  pantoprazole  Injectable 40 milliGRAM(s) IV Push daily  senna 2 Tablet(s) Oral at bedtime  simvastatin 20 milliGRAM(s) Oral at bedtime  sodium chloride 0.9%. 1000 milliLiter(s) (50 mL/Hr) IV Continuous <Continuous>    MEDICATIONS  (PRN):  ondansetron Injectable 4 milliGRAM(s) IV Push every 6 hours PRN Nausea and/or Vomiting      Allergies    No Known Allergies    Intolerances        Vital Signs Last 24 Hrs  T(C): 36.7 (13 Feb 2019 08:26), Max: 37.2 (12 Feb 2019 23:55)  T(F): 98 (13 Feb 2019 08:26), Max: 98.9 (12 Feb 2019 23:55)  HR: 86 (13 Feb 2019 08:26) (75 - 93)  BP: 117/65 (13 Feb 2019 08:26) (114/69 - 137/75)  BP(mean): --  RR: 18 (13 Feb 2019 08:26) (16 - 18)  SpO2: 100% (13 Feb 2019 08:26) (100% - 100%)    PHYSICAL EXAM:  GENERAL: NAD  HEENT: Supple, No JVD, Normal thyroid  NERVOUS SYSTEM:  Alert & Oriented X2,   CHEST/LUNG: Clear to percussion bilaterally; No rales, rhonchi, wheezing, or rubs. R perm carth   HEART: Regular rate and rhythm; No murmurs, rubs, or gallops  ABDOMEN: Soft, Nontender, Nondistended; Bowel sounds present  EXTREMITIES:  2+ Peripheral Pulses, No clubbing, cyanosis, or edema  SKIN: no rashes        LABS:    02-13    134<L>  |  96  |  49<H>  ----------------------------<  114<H>  4.2   |  24  |  8.73<H>    Ca    9.2      13 Feb 2019 06:15          CAPILLARY BLOOD GLUCOSE      POCT Blood Glucose.: 126 mg/dL (13 Feb 2019 11:51)  POCT Blood Glucose.: 160 mg/dL (13 Feb 2019 08:33)  POCT Blood Glucose.: 127 mg/dL (12 Feb 2019 21:25)  POCT Blood Glucose.: 100 mg/dL (12 Feb 2019 16:28)      RADIOLOGY & ADDITIONAL TESTS:

## 2019-02-13 NOTE — PROGRESS NOTE ADULT - PROBLEM SELECTOR PROBLEM 6
Schizophrenia
HLD (hyperlipidemia)
Schizophrenia
HLD (hyperlipidemia)
Schizophrenia
HLD (hyperlipidemia)
Schizophrenia

## 2019-02-13 NOTE — PROGRESS NOTE ADULT - PROBLEM SELECTOR PROBLEM 3
Diabetes mellitus
Anemia
Diabetes mellitus
Anemia
Diabetes mellitus
Diabetes mellitus
Anemia

## 2019-02-13 NOTE — PROGRESS NOTE ADULT - PROBLEM SELECTOR PROBLEM 2
Anemia
Vomiting
Anemia
Vomiting
Anemia
Vomiting

## 2019-02-13 NOTE — PROGRESS NOTE ADULT - REASON FOR ADMISSION
vomiting

## 2019-02-13 NOTE — PROGRESS NOTE ADULT - PROBLEM SELECTOR PLAN 2
- likely secondary to chronic disease   - Hb today 7   - c/w epogen; added Venofer
- likely secondary to chronic disease   - s/p 1 U PRBC on 1/25 and Venofer for 3 days   - c/w epogen intra dialysis   - Monitor H/H
- likely secondary to chronic disease   - Hb today 7.2   - c/w epogen and Venofer
- likely secondary to chronic disease   - Hb today 7.2   - c/w epogen and Venofer
- likely secondary to chronic disease   - Hb today 8.5  - s/p 1 U PRBC on 1/25 and Venofer for 3 days   - c/w epogen
- likely secondary to chronic disease   - Hb today 8.6  - s/p 1 U PRBC on 1/25  - c/w epogen
- likely secondary to chronic disease   - s/p 1 U PRBC on 1/25 and Venofer   -might need to cut down on epogen since H/H increase significantly    -H/H stable now  with epogen
- likely secondary to chronic disease   - s/p 1 U PRBC on 1/25 and Venofer   -might need to cut down on epogen since H/H increase significantly    -H/H stable now  with epogen
- likely secondary to chronic disease   - s/p 1 U PRBC on 1/25 and Venofer for 3 days   - c/w epogen  - Monitor H/H
- likely secondary to chronic disease   - s/p 1 U PRBC on 1/25 and Venofer for 3 days   - c/w epogen intra dialysis    -H/H stable now  with epogen
- likely secondary to chronic disease   - s/p 1 U PRBC on 1/25 and Venofer for 3 days   - c/w epogen intra dialysis   - Monitor H/H
- likely secondary to chronic disease   - s/p 1 U PRBC on 1/25 and Venofer for 3 days   -might need to cut down on epogen since H/H increase significantly    -H/H stable now  with epogen
- resolved now
- likely secondary to chronic disease   - s/p 1 U PRBC on 1/25 and Venofer for 3 days   - c/w epogen intra dialysis   - Monitor H/H
- patient noted to have few episodes of brownish vomitus today  - CT abdomen done, no bowel obstruction or any intraabdominal pathology  - NPO if vomiting continues, diet as tolerated
- likely secondary to chronic disease   - Monitor H/H   - c/w epogen
- likely secondary to chronic disease   - Monitor H/H   - c/w epogen
- resolved now

## 2019-02-13 NOTE — PROGRESS NOTE ADULT - PROBLEM SELECTOR PROBLEM 7
Prophylactic measure
Schizophrenia
Prophylactic measure
Schizophrenia
Prophylactic measure
Schizophrenia

## 2019-02-13 NOTE — PROGRESS NOTE ADULT - SUBJECTIVE AND OBJECTIVE BOX
pt seen and examined.pts current chart reviewed and case discussed with resident covering.    SUBJECTIVE:  pt feels fine and denies cp,sob,gi or gu/uremic  symptons    REVIEW OF SYSTEMS:  CONSTITUTIONAL: No weakness, fevers or chills  EYES/ENT: No visual changes;  No vertigo or throat pain   NECK: No pain or stiffness  RESPIRATORY: No cough, wheezing, hemoptysis; No shortness of breath  CARDIOVASCULAR: No chest pain or palpitations  GASTROINTESTINAL: No abdominal or epigastric pain. No nausea, vomiting, or hematemesis; No diarrhea or constipation. No melena or hematochezia.  GENITOURINARY: No dysuria, frequency , hematuria, flank pain or nocturia  NEUROLOGICAL: No numbness or weakness  SKIN: No itching, burning, rashes, or lesions   All other review of systems is negative unless indicated above    Current meds:    benztropine 0.5 milliGRAM(s) Oral two times a day  calcium acetate 1334 milliGRAM(s) Oral three times a day with meals  chlorhexidine 4% Liquid 1 Application(s) Topical <User Schedule>  docusate sodium 100 milliGRAM(s) Oral two times a day  epoetin seema Injectable 6000 Unit(s) SubCutaneous <User Schedule>  fluticasone propionate 50 MICROgram(s)/spray Nasal Spray 1 Spray(s) Both Nostrils two times a day  insulin glargine Injectable (LANTUS) 8 Unit(s) SubCutaneous at bedtime  insulin lispro (HumaLOG) corrective regimen sliding scale   SubCutaneous Before meals and at bedtime  insulin lispro Injectable (HumaLOG) 4 Unit(s) SubCutaneous three times a day before meals  levothyroxine 25 MICROGram(s) Oral daily  mirtazapine 7.5 milliGRAM(s) Oral daily  Nephro-nasim 1 Tablet(s) Oral daily  ondansetron Injectable 4 milliGRAM(s) IV Push every 6 hours PRN  pantoprazole  Injectable 40 milliGRAM(s) IV Push daily  senna 2 Tablet(s) Oral at bedtime  simvastatin 20 milliGRAM(s) Oral at bedtime  sodium chloride 0.9%. 1000 milliLiter(s) IV Continuous <Continuous>      Vital Signs    T(F): 98 (19 @ 08:26), Max: 98.9 (19 @ 23:55)  HR: 86 (19 @ 08:26) (75 - 93)  BP: 117/65 (19 @ 08:26) (114/69 - 137/75)  ABP: --  RR: 18 (19 @ 08:26) (16 - 18)  SpO2: 100% (19 @ 08:26) (100% - 100%)  Wt(kg): --  CVP(cm H2O): --  CO: --  PCWP: --    I and O's:    Daily     Daily Weight in k.3 (2019 17:09)    PHYSICAL EXAM:  Constitutional: well developed, well nourished  and in nad  HEENT: PERRLA,  no icteric sclera and mild pallor of conjunctiva noted  Neck: No JVD, thyromegaly or adenopathy  PC noted in Rt chest  Respiratory: reduced air entry lower lungs with no rales, wheezing or rhonchi  Cardiovascular: S1 and S2 normally heard  Gastrointestinal: soft, not distended  and normal bowel sounds heard  Back: no cva tenderness or swelling noted   Extremities: No peripheral edema or cyanosis  Neurological: A/O x 3, no focal deficits  Skin: No rashes      LABS:    CBC:            BMP:        134<L>  |  96  |  49<H>  ----------------------------<  114<H>  4.2   |  24  |  8.73<H>  02-12    127<L>  |  88<L>  |  94<H>  ----------------------------<  177<H>  4.4   |  24  |  13.20<H>  02-11    131<L>  |  91<L>  |  73<H>  ----------------------------<  211<H>  4.0   |  26  |  11.00<H>  02-10    130<L>  |  92<L>  |  64<H>  ----------------------------<  254<H>  4.5   |  23  |  9.83<H>    Ca    9.2      2019 06:15  Ca    10.1      2019 13:38  Ca    9.7      2019 05:26  Ca    9.8      10 2019 18:31  Phos  6.7     02-  Mg     2.6     -11    TPro  9.1<H>  /  Alb  4.0  /  TBili  0.4  /  DBili  x   /  AST  14  /  ALT  20  /  AlkPhos  152<H>  -          URINE STUDIES:                        RADIOLOGY & ADDITIONAL STUDIES: pt seen and examined.    SUBJECTIVE:  pt is always sleeping  She  feels much better today and denies cp,sob,gi or uremic  symptons        Current meds:    benztropine 0.5 milliGRAM(s) Oral two times a day  calcium acetate 1334 milliGRAM(s) Oral three times a day with meals  chlorhexidine 4% Liquid 1 Application(s) Topical <User Schedule>  docusate sodium 100 milliGRAM(s) Oral two times a day  epoetin seema Injectable 6000 Unit(s) SubCutaneous <User Schedule>  fluticasone propionate 50 MICROgram(s)/spray Nasal Spray 1 Spray(s) Both Nostrils two times a day  insulin glargine Injectable (LANTUS) 8 Unit(s) SubCutaneous at bedtime  insulin lispro (HumaLOG) corrective regimen sliding scale   SubCutaneous Before meals and at bedtime  insulin lispro Injectable (HumaLOG) 4 Unit(s) SubCutaneous three times a day before meals  levothyroxine 25 MICROGram(s) Oral daily  mirtazapine 7.5 milliGRAM(s) Oral daily  Nephro-nasim 1 Tablet(s) Oral daily  ondansetron Injectable 4 milliGRAM(s) IV Push every 6 hours PRN  pantoprazole  Injectable 40 milliGRAM(s) IV Push daily  senna 2 Tablet(s) Oral at bedtime  simvastatin 20 milliGRAM(s) Oral at bedtime  sodium chloride 0.9%. 1000 milliLiter(s) IV Continuous <Continuous>      Vital Signs    T(F): 98 (19 @ 08:26), Max: 98.9 (19 @ 23:55)  HR: 86 (19 @ 08:26) (75 - 93)  BP: 117/65 (19 @ 08:26) (114/69 - 137/75)  ABP: --  RR: 18 (19 @ 08:26) (16 - 18)  SpO2: 100% (19 @ 08:26) (100% - 100%)  Wt(kg): --  CVP(cm H2O): --  CO: --  PCWP: --    I and O's:    Daily     Daily Weight in k.3 (2019 17:09)    PHYSICAL EXAM:  Constitutional: well developed, well nourished  and in nad  HEENT: PERRLA,  no icteric sclera and mild pallor of conjunctiva noted  Neck: No JVD, thyromegaly or adenopathy  PC noted in Rt chest  Respiratory: reduced air entry lower lungs with no rales, wheezing or rhonchi  Cardiovascular: S1 and S2 normally heard  Gastrointestinal: soft, not distended  and normal bowel sounds heard  Extremities: No peripheral edema or cyanosis  Neurological: A/O x 3, no focal deficits  Skin: No rashes      LABS:      BMP:        134<L>  |  96  |  49<H>  ----------------------------<  114<H>  4.2   |  24  |  8.73<H>      127<L>  |  88<L>  |  94<H>  ----------------------------<  177<H>  4.4   |  24  |  13.20<H>      131<L>  |  91<L>  |  73<H>  ----------------------------<  211<H>  4.0   |  26  |  11.00<H>  02-10    130<L>  |  92<L>  |  64<H>  ----------------------------<  254<H>  4.5   |  23  |  9.83<H>    Ca    9.2      2019 06:15  Ca    10.1      2019 13:38  Ca    9.7      2019 05:26  Ca    9.8      10 2019 18:31  Phos  6.7       Mg     2.6         TPro  9.1<H>  /  Alb  4.0  /  TBili  0.4  /  DBili  x   /  AST  14  /  ALT  20  /  AlkPhos  152<H>

## 2019-02-13 NOTE — PROGRESS NOTE ADULT - PROBLEM SELECTOR PROBLEM 1
Acute renal failure

## 2019-02-13 NOTE — PROGRESS NOTE ADULT - PROBLEM SELECTOR PROBLEM 5
HLD (hyperlipidemia)
Hypothyroid
HLD (hyperlipidemia)
Hypothyroid
HLD (hyperlipidemia)
Hypothyroid

## 2019-02-13 NOTE — PROGRESS NOTE ADULT - PROBLEM SELECTOR PLAN 3
- likely secondary to chronic disease   - s/p 1 U PRBC on 1/25 and Venofer for 3 days   - c/w epogen intra dialysis   - Monitor H/H
- Hold home oral meds , Glucophage and Glimepiride  - HSS   - Monitor accu cheks AC / HS   - hbA1c 6.8
- Hold home oral meds , Glucophage and Glimepiride  - HSS   - Monitor sccu cheks AC / HS   - hbA1c 6.8
- Holding home oral meds , Glucophage and Glimepiride  - continue Lantus 5 units qhs and Humalog 2 units pre meals along with HSS   - Monitor accu cheks AC / HS   - hbA1c 6.8
- Hold home oral meds , Glucophage and Glimepiride  - HSS   - Monitor accu cheks AC / HS   - hbA1c 6.8
- Holding home oral meds , Glucophage and Glimepiride  - FS running high , insulin regimen adjusted , changed Lantus to 8 units qhs and increased Humalog pre meals to 4 units tid  - continue HSS   - Monitor accu cheks AC / HS   - hbA1c 6.8
- Holding home oral meds , Glucophage and Glimepiride  - FS running high in 200s and 300s   - Started Lantus 5 units qhs and Humalog 2 units pre meals, c/w HSS   - Monitor accu cheks AC / HS   - hbA1c 6.8
- Holding home oral meds , Glucophage and Glimepiride  - FS running high in 200s and 300s   - Started Lantus 5 units qhs and Humalog 2 units pre meals, c/w HSS   - Monitor accu cheks AC / HS   - hbA1c 6.8
- Holding home oral meds , Glucophage and Glimepiride  - continue Lantus 5 units qhs and Humalog 2 units pre meals along with HSS   - Monitor accu cheks AC / HS   - hbA1c 6.8
- Holding home oral meds , Glucophage and Glimepiride  - continue Lantus 5 units qhs and Humalog 2 units pre meals along with HSS   - Monitor accu cheks AC / HS   - hbA1c 6.8
- likely secondary to chronic disease   - s/p 1 U PRBC on 1/25 and Venofer for 3 days   - c/w epogen intra dialysis   - Monitor H/H
- Holding home oral meds , Glucophage and Glimepiride  - FS running high in 200s and 300s   - Started Lantus 5 units qhs and Humalog 2 units pre meals, c/w HSS   - Monitor accu cheks AC / HS   - hbA1c 6.8
- likely secondary to chronic disease   - s/p 1 U PRBC on 1/25 and Venofer for 3 days   - c/w epogen intra dialysis   - Monitor H/H
- Hold home oral meds , Glucophage and Glimepiride  - HSS   - Monitor accu cheks AC / HS   - hbA1c 6.8

## 2019-02-13 NOTE — PROGRESS NOTE ADULT - PROBLEM SELECTOR PROBLEM 4
Hypothyroid
Diabetes mellitus
Hypothyroid
Diabetes mellitus
Hypothyroid
Diabetes mellitus
Hypothyroid

## 2019-02-13 NOTE — PROGRESS NOTE ADULT - PROBLEM SELECTOR PLAN 1
- kidney biopsy shows chronic changes and sclerosis   -perinephric hematoma--> no intervention as per urology   - pt sleepy most of the time with no much appetite, but today more energetic--> c/w mirtazapine for mood and improve appetite. c/w nephro nasim .    Follow up with Vascular for AVF / AVG placement on 3/18/19 @ 9:15 am with Dr. Cárdenas ([phone]) at [place] [address].   Pt will follow up with Dr. Herzog at HD center.  Pt was for discharge pt needed dialysis first and the assisted living don't take patient after 2pm  dc tomorrow

## 2019-02-13 NOTE — PROGRESS NOTE ADULT - SUBJECTIVE AND OBJECTIVE BOX
Patient is a 53y old  Female who presents with a chief complaint of vomiting. Found to be in ARF 2/2 worsening CKD. Pt had kidney biopsy which came back with sclerosis. After biopsy pt had right kidney hematoma but no intervention needed. Now on HD. Pt also to have anemia of chronic disease 2/2 CKD. Started on epogen now anemia improved.       INTERVAL HPI/OVERNIGHT EVENTS:    no events overnight, less sleepy     MEDICATIONS  (STANDING):  benztropine 0.5 milliGRAM(s) Oral two times a day  calcium acetate 1334 milliGRAM(s) Oral three times a day with meals  chlorhexidine 4% Liquid 1 Application(s) Topical <User Schedule>  docusate sodium 100 milliGRAM(s) Oral two times a day  epoetin seema Injectable 6000 Unit(s) SubCutaneous <User Schedule>  fluticasone propionate 50 MICROgram(s)/spray Nasal Spray 1 Spray(s) Both Nostrils two times a day  insulin glargine Injectable (LANTUS) 8 Unit(s) SubCutaneous at bedtime  insulin lispro (HumaLOG) corrective regimen sliding scale   SubCutaneous Before meals and at bedtime  insulin lispro Injectable (HumaLOG) 4 Unit(s) SubCutaneous three times a day before meals  levothyroxine 25 MICROGram(s) Oral daily  mirtazapine 7.5 milliGRAM(s) Oral daily  Nephro-nasim 1 Tablet(s) Oral daily  pantoprazole  Injectable 40 milliGRAM(s) IV Push daily  senna 2 Tablet(s) Oral at bedtime  simvastatin 20 milliGRAM(s) Oral at bedtime  sodium chloride 0.9%. 1000 milliLiter(s) (50 mL/Hr) IV Continuous <Continuous>    MEDICATIONS  (PRN):  ondansetron Injectable 4 milliGRAM(s) IV Push every 6 hours PRN Nausea and/or Vomiting      Allergies    No Known Allergies    Intolerances        Vital Signs Last 24 Hrs  T(C): 36.7 (13 Feb 2019 08:26), Max: 37.2 (12 Feb 2019 23:55)  T(F): 98 (13 Feb 2019 08:26), Max: 98.9 (12 Feb 2019 23:55)  HR: 86 (13 Feb 2019 08:26) (73 - 93)  BP: 117/65 (13 Feb 2019 08:26) (114/69 - 137/75)  BP(mean): --  RR: 18 (13 Feb 2019 08:26) (16 - 18)  SpO2: 100% (13 Feb 2019 08:26) (100% - 100%)    PHYSICAL EXAM:  GENERAL: NAD  HEENT: Supple, No JVD, Normal thyroid  NERVOUS SYSTEM:  Alert & Oriented X2,   CHEST/LUNG: Clear to percussion bilaterally; No rales, rhonchi, wheezing, or rubs. R perm carth   HEART: Regular rate and rhythm; No murmurs, rubs, or gallops  ABDOMEN: Soft, Nontender, Nondistended; Bowel sounds present  EXTREMITIES:  2+ Peripheral Pulses, No clubbing, cyanosis, or edema  SKIN: no rashes        LABS:    02-13    134<L>  |  96  |  49<H>  ----------------------------<  114<H>  4.2   |  24  |  8.73<H>    Ca    9.2      13 Feb 2019 06:15          CAPILLARY BLOOD GLUCOSE      POCT Blood Glucose.: 160 mg/dL (13 Feb 2019 08:33)  POCT Blood Glucose.: 127 mg/dL (12 Feb 2019 21:25)  POCT Blood Glucose.: 100 mg/dL (12 Feb 2019 16:28)  POCT Blood Glucose.: 192 mg/dL (12 Feb 2019 12:00)      RADIOLOGY & ADDITIONAL TESTS:

## 2019-02-13 NOTE — PROGRESS NOTE ADULT - PROBLEM SELECTOR PLAN 7
IMPROVE VTE Individual Risk Assessment          RISK                                                          Points  [  ] Previous VTE                                                3  [  ] Thrombophilia                                             2  [  ] Lower limb paralysis                                   2        (unable to hold up >15 seconds)    [  ] Current Cancer                                             2         (within 6 months)  [ x ] Immobilization > 24 hrs                              1  [  ] ICU/CCU stay > 24 hours                             1  [  ] Age > 60                                                         1    IMPROVE VTE Score: 1  Heparin sq for DVT ppx
IMPROVE VTE Individual Risk Assessment          RISK                                                          Points  [  ] Previous VTE                                                3  [  ] Thrombophilia                                             2  [  ] Lower limb paralysis                                   2        (unable to hold up >15 seconds)    [  ] Current Cancer                                             2         (within 6 months)  [ x ] Immobilization > 24 hrs                              1  [  ] ICU/CCU stay > 24 hours                             1  [  ] Age > 60                                                         1    IMPROVE VTE Score: 1  Heparin sq for DVT ppx
- On monthly injection of Invega
IMPROVE VTE Individual Risk Assessment          RISK                                                          Points  [  ] Previous VTE                                                3  [  ] Thrombophilia                                             2  [  ] Lower limb paralysis                                   2        (unable to hold up >15 seconds)    [  ] Current Cancer                                             2         (within 6 months)  [ x ] Immobilization > 24 hrs                              1  [  ] ICU/CCU stay > 24 hours                             1  [  ] Age > 60                                                         1    IMPROVE VTE Score: 1  Heparin sq for DVT ppx
IMPROVE VTE Individual Risk Assessment          RISK                                                          Points  [  ] Previous VTE                                                3  [  ] Thrombophilia                                             2  [  ] Lower limb paralysis                                   2        (unable to hold up >15 seconds)    [  ] Current Cancer                                             2         (within 6 months)  [ x ] Immobilization > 24 hrs                              1  [  ] ICU/CCU stay > 24 hours                             1  [  ] Age > 60                                                         1    IMPROVE VTE Score: 1  No indication for DVT ppx
IMPROVE VTE Individual Risk Assessment          RISK                                                          Points  [  ] Previous VTE                                                3  [  ] Thrombophilia                                             2  [  ] Lower limb paralysis                                   2        (unable to hold up >15 seconds)    [  ] Current Cancer                                             2         (within 6 months)  [ x ] Immobilization > 24 hrs                              1  [  ] ICU/CCU stay > 24 hours                             1  [  ] Age > 60                                                         1    IMPROVE VTE Score: 1  Heparin sq for DVT ppx
- On monthly injection of Invega
IMPROVE VTE Individual Risk Assessment          RISK                                                          Points  [  ] Previous VTE                                                3  [  ] Thrombophilia                                             2  [  ] Lower limb paralysis                                   2        (unable to hold up >15 seconds)    [  ] Current Cancer                                             2         (within 6 months)  [ x ] Immobilization > 24 hrs                              1  [  ] ICU/CCU stay > 24 hours                             1  [  ] Age > 60                                                         1    IMPROVE VTE Score: 1    no need for dvt prophylaxis
IMPROVE VTE Individual Risk Assessment          RISK                                                          Points  [  ] Previous VTE                                                3  [  ] Thrombophilia                                             2  [  ] Lower limb paralysis                                   2        (unable to hold up >15 seconds)    [  ] Current Cancer                                             2         (within 6 months)  [ x ] Immobilization > 24 hrs                              1  [  ] ICU/CCU stay > 24 hours                             1  [  ] Age > 60                                                         1    IMPROVE VTE Score: 1  Heparin sq for DVT ppx
- On monthly injection of Invega

## 2019-02-13 NOTE — PROGRESS NOTE ADULT - PROVIDER SPECIALTY LIST ADULT
Internal Medicine
Intervent Radiology
Intervent Radiology
Nephrology
Urology
Nephrology
Internal Medicine
Nephrology
Nephrology
Internal Medicine

## 2019-02-14 VITALS
DIASTOLIC BLOOD PRESSURE: 84 MMHG | HEART RATE: 69 BPM | OXYGEN SATURATION: 100 % | TEMPERATURE: 98 F | SYSTOLIC BLOOD PRESSURE: 128 MMHG | RESPIRATION RATE: 16 BRPM

## 2019-02-14 LAB
ANION GAP SERPL CALC-SCNC: 11 MMOL/L — SIGNIFICANT CHANGE UP (ref 5–17)
BUN SERPL-MCNC: 46 MG/DL — HIGH (ref 7–18)
CALCIUM SERPL-MCNC: 9.4 MG/DL — SIGNIFICANT CHANGE UP (ref 8.4–10.5)
CHLORIDE SERPL-SCNC: 101 MMOL/L — SIGNIFICANT CHANGE UP (ref 96–108)
CO2 SERPL-SCNC: 25 MMOL/L — SIGNIFICANT CHANGE UP (ref 22–31)
CREAT SERPL-MCNC: 8.31 MG/DL — HIGH (ref 0.5–1.3)
GLUCOSE BLDC GLUCOMTR-MCNC: 101 MG/DL — HIGH (ref 70–99)
GLUCOSE BLDC GLUCOMTR-MCNC: 136 MG/DL — HIGH (ref 70–99)
GLUCOSE SERPL-MCNC: 90 MG/DL — SIGNIFICANT CHANGE UP (ref 70–99)
HCT VFR BLD CALC: 32 % — LOW (ref 34.5–45)
HGB BLD-MCNC: 10.1 G/DL — LOW (ref 11.5–15.5)
MAGNESIUM SERPL-MCNC: 2.2 MG/DL — SIGNIFICANT CHANGE UP (ref 1.6–2.6)
MCHC RBC-ENTMCNC: 27.7 PG — SIGNIFICANT CHANGE UP (ref 27–34)
MCHC RBC-ENTMCNC: 31.6 GM/DL — LOW (ref 32–36)
MCV RBC AUTO: 87.7 FL — SIGNIFICANT CHANGE UP (ref 80–100)
NRBC # BLD: 0 /100 WBCS — SIGNIFICANT CHANGE UP (ref 0–0)
PHOSPHATE SERPL-MCNC: 5.5 MG/DL — HIGH (ref 2.5–4.5)
PLATELET # BLD AUTO: 157 K/UL — SIGNIFICANT CHANGE UP (ref 150–400)
POTASSIUM SERPL-MCNC: 3.7 MMOL/L — SIGNIFICANT CHANGE UP (ref 3.5–5.3)
POTASSIUM SERPL-SCNC: 3.7 MMOL/L — SIGNIFICANT CHANGE UP (ref 3.5–5.3)
RBC # BLD: 3.65 M/UL — LOW (ref 3.8–5.2)
RBC # FLD: 13.4 % — SIGNIFICANT CHANGE UP (ref 10.3–14.5)
SODIUM SERPL-SCNC: 137 MMOL/L — SIGNIFICANT CHANGE UP (ref 135–145)
WBC # BLD: 8.19 K/UL — SIGNIFICANT CHANGE UP (ref 3.8–10.5)
WBC # FLD AUTO: 8.19 K/UL — SIGNIFICANT CHANGE UP (ref 3.8–10.5)

## 2019-02-14 PROCEDURE — 80061 LIPID PANEL: CPT

## 2019-02-14 PROCEDURE — 84100 ASSAY OF PHOSPHORUS: CPT

## 2019-02-14 PROCEDURE — 83615 LACTATE (LD) (LDH) ENZYME: CPT

## 2019-02-14 PROCEDURE — 83036 HEMOGLOBIN GLYCOSYLATED A1C: CPT

## 2019-02-14 PROCEDURE — 84166 PROTEIN E-PHORESIS/URINE/CSF: CPT

## 2019-02-14 PROCEDURE — 36558 INSERT TUNNELED CV CATH: CPT

## 2019-02-14 PROCEDURE — 99285 EMERGENCY DEPT VISIT HI MDM: CPT | Mod: 25

## 2019-02-14 PROCEDURE — 86431 RHEUMATOID FACTOR QUANT: CPT

## 2019-02-14 PROCEDURE — 82803 BLOOD GASES ANY COMBINATION: CPT

## 2019-02-14 PROCEDURE — 83520 IMMUNOASSAY QUANT NOS NONAB: CPT

## 2019-02-14 PROCEDURE — 84165 PROTEIN E-PHORESIS SERUM: CPT

## 2019-02-14 PROCEDURE — 82553 CREATINE MB FRACTION: CPT

## 2019-02-14 PROCEDURE — 80053 COMPREHEN METABOLIC PANEL: CPT

## 2019-02-14 PROCEDURE — 82232 ASSAY OF BETA-2 PROTEIN: CPT

## 2019-02-14 PROCEDURE — 86036 ANCA SCREEN EACH ANTIBODY: CPT

## 2019-02-14 PROCEDURE — 76770 US EXAM ABDO BACK WALL COMP: CPT

## 2019-02-14 PROCEDURE — 86900 BLOOD TYPING SEROLOGIC ABO: CPT

## 2019-02-14 PROCEDURE — 85610 PROTHROMBIN TIME: CPT

## 2019-02-14 PROCEDURE — 83880 ASSAY OF NATRIURETIC PEPTIDE: CPT

## 2019-02-14 PROCEDURE — 82595 ASSAY OF CRYOGLOBULIN: CPT

## 2019-02-14 PROCEDURE — 74176 CT ABD & PELVIS W/O CONTRAST: CPT

## 2019-02-14 PROCEDURE — 80048 BASIC METABOLIC PNL TOTAL CA: CPT

## 2019-02-14 PROCEDURE — 93005 ELECTROCARDIOGRAM TRACING: CPT

## 2019-02-14 PROCEDURE — 83935 ASSAY OF URINE OSMOLALITY: CPT

## 2019-02-14 PROCEDURE — 50200 RENAL BIOPSY PERQ: CPT

## 2019-02-14 PROCEDURE — 84484 ASSAY OF TROPONIN QUANT: CPT

## 2019-02-14 PROCEDURE — 99261: CPT

## 2019-02-14 PROCEDURE — P9040: CPT

## 2019-02-14 PROCEDURE — 76937 US GUIDE VASCULAR ACCESS: CPT

## 2019-02-14 PROCEDURE — 86334 IMMUNOFIX E-PHORESIS SERUM: CPT

## 2019-02-14 PROCEDURE — 83010 ASSAY OF HAPTOGLOBIN QUANT: CPT

## 2019-02-14 PROCEDURE — 83550 IRON BINDING TEST: CPT

## 2019-02-14 PROCEDURE — 82550 ASSAY OF CK (CPK): CPT

## 2019-02-14 PROCEDURE — 82962 GLUCOSE BLOOD TEST: CPT

## 2019-02-14 PROCEDURE — 86901 BLOOD TYPING SEROLOGIC RH(D): CPT

## 2019-02-14 PROCEDURE — 71045 X-RAY EXAM CHEST 1 VIEW: CPT

## 2019-02-14 PROCEDURE — 87389 HIV-1 AG W/HIV-1&-2 AB AG IA: CPT

## 2019-02-14 PROCEDURE — 81025 URINE PREGNANCY TEST: CPT

## 2019-02-14 PROCEDURE — 82570 ASSAY OF URINE CREATININE: CPT

## 2019-02-14 PROCEDURE — 36430 TRANSFUSION BLD/BLD COMPNT: CPT

## 2019-02-14 PROCEDURE — 86038 ANTINUCLEAR ANTIBODIES: CPT

## 2019-02-14 PROCEDURE — 86850 RBC ANTIBODY SCREEN: CPT

## 2019-02-14 PROCEDURE — 80074 ACUTE HEPATITIS PANEL: CPT

## 2019-02-14 PROCEDURE — 86325 OTHER IMMUNOELECTROPHORESIS: CPT

## 2019-02-14 PROCEDURE — 36415 COLL VENOUS BLD VENIPUNCTURE: CPT

## 2019-02-14 PROCEDURE — 77012 CT SCAN FOR NEEDLE BIOPSY: CPT

## 2019-02-14 PROCEDURE — 84550 ASSAY OF BLOOD/URIC ACID: CPT

## 2019-02-14 PROCEDURE — 82746 ASSAY OF FOLIC ACID SERUM: CPT

## 2019-02-14 PROCEDURE — 82607 VITAMIN B-12: CPT

## 2019-02-14 PROCEDURE — 82272 OCCULT BLD FECES 1-3 TESTS: CPT

## 2019-02-14 PROCEDURE — 84155 ASSAY OF PROTEIN SERUM: CPT

## 2019-02-14 PROCEDURE — 83540 ASSAY OF IRON: CPT

## 2019-02-14 PROCEDURE — 94640 AIRWAY INHALATION TREATMENT: CPT

## 2019-02-14 PROCEDURE — C1769: CPT

## 2019-02-14 PROCEDURE — 82310 ASSAY OF CALCIUM: CPT

## 2019-02-14 PROCEDURE — 86923 COMPATIBILITY TEST ELECTRIC: CPT

## 2019-02-14 PROCEDURE — 77001 FLUOROGUIDE FOR VEIN DEVICE: CPT

## 2019-02-14 PROCEDURE — 85027 COMPLETE CBC AUTOMATED: CPT

## 2019-02-14 PROCEDURE — 83970 ASSAY OF PARATHORMONE: CPT

## 2019-02-14 PROCEDURE — 36581 REPLACE TUNNELED CV CATH: CPT

## 2019-02-14 PROCEDURE — C1750: CPT

## 2019-02-14 PROCEDURE — 82977 ASSAY OF GGT: CPT

## 2019-02-14 PROCEDURE — 84300 ASSAY OF URINE SODIUM: CPT

## 2019-02-14 PROCEDURE — 86160 COMPLEMENT ANTIGEN: CPT

## 2019-02-14 PROCEDURE — 84443 ASSAY THYROID STIM HORMONE: CPT

## 2019-02-14 PROCEDURE — 84466 ASSAY OF TRANSFERRIN: CPT

## 2019-02-14 PROCEDURE — 85730 THROMBOPLASTIN TIME PARTIAL: CPT

## 2019-02-14 PROCEDURE — 81001 URINALYSIS AUTO W/SCOPE: CPT

## 2019-02-14 PROCEDURE — 84702 CHORIONIC GONADOTROPIN TEST: CPT

## 2019-02-14 PROCEDURE — 83735 ASSAY OF MAGNESIUM: CPT

## 2019-02-14 PROCEDURE — 97161 PT EVAL LOW COMPLEX 20 MIN: CPT

## 2019-02-14 PROCEDURE — 86060 ANTISTREPTOLYSIN O TITER: CPT

## 2019-02-14 PROCEDURE — 82728 ASSAY OF FERRITIN: CPT

## 2019-02-14 PROCEDURE — 84156 ASSAY OF PROTEIN URINE: CPT

## 2019-02-14 RX ADMIN — Medication 25 MICROGRAM(S): at 06:36

## 2019-02-14 RX ADMIN — Medication 1 TABLET(S): at 11:34

## 2019-02-14 RX ADMIN — Medication 1334 MILLIGRAM(S): at 08:54

## 2019-02-14 RX ADMIN — Medication 1334 MILLIGRAM(S): at 11:34

## 2019-02-14 RX ADMIN — Medication 100 MILLIGRAM(S): at 06:35

## 2019-02-14 RX ADMIN — PANTOPRAZOLE SODIUM 40 MILLIGRAM(S): 20 TABLET, DELAYED RELEASE ORAL at 11:34

## 2019-02-14 RX ADMIN — Medication 0.5 MILLIGRAM(S): at 06:36

## 2019-02-14 RX ADMIN — MIRTAZAPINE 7.5 MILLIGRAM(S): 45 TABLET, ORALLY DISINTEGRATING ORAL at 11:34

## 2019-02-14 RX ADMIN — CHLORHEXIDINE GLUCONATE 1 APPLICATION(S): 213 SOLUTION TOPICAL at 11:29

## 2019-02-14 RX ADMIN — Medication 1 SPRAY(S): at 06:36

## 2019-03-18 ENCOUNTER — APPOINTMENT (OUTPATIENT)
Dept: VASCULAR SURGERY | Facility: CLINIC | Age: 54
End: 2019-03-18

## 2019-05-31 ENCOUNTER — EMERGENCY (EMERGENCY)
Facility: HOSPITAL | Age: 54
LOS: 1 days | Discharge: HOME | End: 2019-05-31
Attending: EMERGENCY MEDICINE

## 2019-05-31 VITALS
HEART RATE: 55 BPM | OXYGEN SATURATION: 98 % | RESPIRATION RATE: 18 BRPM | HEIGHT: 59 IN | DIASTOLIC BLOOD PRESSURE: 52 MMHG | WEIGHT: 125 LBS | SYSTOLIC BLOOD PRESSURE: 102 MMHG | TEMPERATURE: 98 F

## 2019-05-31 VITALS
DIASTOLIC BLOOD PRESSURE: 65 MMHG | SYSTOLIC BLOOD PRESSURE: 130 MMHG | RESPIRATION RATE: 20 BRPM | HEART RATE: 78 BPM | OXYGEN SATURATION: 98 %

## 2019-05-31 LAB
ALBUMIN SERPL ELPH-MCNC: 3.3 G/DL — LOW (ref 3.5–5)
ALP SERPL-CCNC: 151 U/L — HIGH (ref 40–120)
ALT FLD-CCNC: 25 U/L DA — SIGNIFICANT CHANGE UP (ref 10–60)
ANION GAP SERPL CALC-SCNC: 10 MMOL/L — SIGNIFICANT CHANGE UP (ref 5–17)
AST SERPL-CCNC: 22 U/L — SIGNIFICANT CHANGE UP (ref 10–40)
BASOPHILS # BLD AUTO: 0.05 K/UL — SIGNIFICANT CHANGE UP (ref 0–0.2)
BASOPHILS NFR BLD AUTO: 1.2 % — SIGNIFICANT CHANGE UP (ref 0–2)
BILIRUB SERPL-MCNC: 0.2 MG/DL — SIGNIFICANT CHANGE UP (ref 0.2–1.2)
BUN SERPL-MCNC: 56 MG/DL — HIGH (ref 7–18)
CALCIUM SERPL-MCNC: 8.6 MG/DL — SIGNIFICANT CHANGE UP (ref 8.4–10.5)
CHLORIDE SERPL-SCNC: 98 MMOL/L — SIGNIFICANT CHANGE UP (ref 96–108)
CO2 SERPL-SCNC: 27 MMOL/L — SIGNIFICANT CHANGE UP (ref 22–31)
CREAT SERPL-MCNC: 7.49 MG/DL — HIGH (ref 0.5–1.3)
EOSINOPHIL # BLD AUTO: 0.2 K/UL — SIGNIFICANT CHANGE UP (ref 0–0.5)
EOSINOPHIL NFR BLD AUTO: 4.8 % — SIGNIFICANT CHANGE UP (ref 0–6)
GLUCOSE SERPL-MCNC: 244 MG/DL — HIGH (ref 70–99)
HCT VFR BLD CALC: 39.9 % — SIGNIFICANT CHANGE UP (ref 34.5–45)
HGB BLD-MCNC: 12.5 G/DL — SIGNIFICANT CHANGE UP (ref 11.5–15.5)
IMM GRANULOCYTES NFR BLD AUTO: 0 % — SIGNIFICANT CHANGE UP (ref 0–1.5)
LYMPHOCYTES # BLD AUTO: 1.37 K/UL — SIGNIFICANT CHANGE UP (ref 1–3.3)
LYMPHOCYTES # BLD AUTO: 32.9 % — SIGNIFICANT CHANGE UP (ref 13–44)
MCHC RBC-ENTMCNC: 26.7 PG — LOW (ref 27–34)
MCHC RBC-ENTMCNC: 31.3 GM/DL — LOW (ref 32–36)
MCV RBC AUTO: 85.3 FL — SIGNIFICANT CHANGE UP (ref 80–100)
MONOCYTES # BLD AUTO: 0.5 K/UL — SIGNIFICANT CHANGE UP (ref 0–0.9)
MONOCYTES NFR BLD AUTO: 12 % — SIGNIFICANT CHANGE UP (ref 2–14)
NEUTROPHILS # BLD AUTO: 2.04 K/UL — SIGNIFICANT CHANGE UP (ref 1.8–7.4)
NEUTROPHILS NFR BLD AUTO: 49.1 % — SIGNIFICANT CHANGE UP (ref 43–77)
NRBC # BLD: 0 /100 WBCS — SIGNIFICANT CHANGE UP (ref 0–0)
PLATELET # BLD AUTO: 187 K/UL — SIGNIFICANT CHANGE UP (ref 150–400)
POTASSIUM SERPL-MCNC: 3.2 MMOL/L — LOW (ref 3.5–5.3)
POTASSIUM SERPL-SCNC: 3.2 MMOL/L — LOW (ref 3.5–5.3)
PROT SERPL-MCNC: 7.7 G/DL — SIGNIFICANT CHANGE UP (ref 6–8.3)
RBC # BLD: 4.68 M/UL — SIGNIFICANT CHANGE UP (ref 3.8–5.2)
RBC # FLD: 13.3 % — SIGNIFICANT CHANGE UP (ref 10.3–14.5)
SODIUM SERPL-SCNC: 135 MMOL/L — SIGNIFICANT CHANGE UP (ref 135–145)
WBC # BLD: 4.16 K/UL — SIGNIFICANT CHANGE UP (ref 3.8–10.5)
WBC # FLD AUTO: 4.16 K/UL — SIGNIFICANT CHANGE UP (ref 3.8–10.5)

## 2019-05-31 PROCEDURE — 99283 EMERGENCY DEPT VISIT LOW MDM: CPT

## 2019-05-31 NOTE — ED ADULT NURSE NOTE - NSIMPLEMENTINTERV_GEN_ALL_ED
Implemented All Universal Safety Interventions:  [place] to call system. Call bell, personal items and telephone within reach. Instruct patient to call for assistance. Room bathroom lighting operational. Non-slip footwear when patient is off stretcher. Physically safe environment: no spills, clutter or unnecessary equipment. Stretcher in lowest position, wheels locked, appropriate side rails in place.

## 2019-05-31 NOTE — ED PROVIDER NOTE - PHYSICAL EXAMINATION
CONSTITUTIONAL: Well-developed; well-nourished; in no acute distress.   SKIN: warm, dry  HEAD: Normocephalic; atraumatic.  EYES: PERRL, EOMI, no conjunctival erythema  ENT: No nasal discharge; airway clear.  NECK: Supple; non tender.  CARD: S1, S2 normal; no murmurs, gallops, or rubs. Regular rate and rhythm.   RESP: No wheezes, rales or rhonchi.  ABD: soft ntnd  EXT: Normal ROM.  No clubbing, cyanosis or edema.  Catheter flushing well   LYMPH: No acute cervical adenopathy.  NEURO: Alert, oriented, grossly unremarkable  PSYCH: Cooperative, appropriate.

## 2019-06-01 ENCOUNTER — INPATIENT (INPATIENT)
Facility: HOSPITAL | Age: 54
LOS: 5 days | Discharge: NURSING, REHAB OR LONG-TERM CARE | DRG: 314 | End: 2019-06-07
Attending: INTERNAL MEDICINE | Admitting: INTERNAL MEDICINE
Payer: MEDICARE

## 2019-06-01 VITALS
TEMPERATURE: 98 F | DIASTOLIC BLOOD PRESSURE: 64 MMHG | WEIGHT: 169.98 LBS | SYSTOLIC BLOOD PRESSURE: 98 MMHG | RESPIRATION RATE: 18 BRPM | HEART RATE: 54 BPM

## 2019-06-01 DIAGNOSIS — E11.9 TYPE 2 DIABETES MELLITUS WITHOUT COMPLICATIONS: ICD-10-CM

## 2019-06-01 DIAGNOSIS — F31.9 BIPOLAR DISORDER, UNSPECIFIED: ICD-10-CM

## 2019-06-01 DIAGNOSIS — N18.9 CHRONIC KIDNEY DISEASE, UNSPECIFIED: ICD-10-CM

## 2019-06-01 DIAGNOSIS — Z29.9 ENCOUNTER FOR PROPHYLACTIC MEASURES, UNSPECIFIED: ICD-10-CM

## 2019-06-01 DIAGNOSIS — N18.6 END STAGE RENAL DISEASE: ICD-10-CM

## 2019-06-01 LAB
ALBUMIN SERPL ELPH-MCNC: 3.5 G/DL — SIGNIFICANT CHANGE UP (ref 3.5–5)
ALP SERPL-CCNC: 141 U/L — HIGH (ref 40–120)
ALT FLD-CCNC: 28 U/L DA — SIGNIFICANT CHANGE UP (ref 10–60)
ANION GAP SERPL CALC-SCNC: 11 MMOL/L — SIGNIFICANT CHANGE UP (ref 5–17)
AST SERPL-CCNC: 22 U/L — SIGNIFICANT CHANGE UP (ref 10–40)
BASOPHILS # BLD AUTO: 0.07 K/UL — SIGNIFICANT CHANGE UP (ref 0–0.2)
BASOPHILS NFR BLD AUTO: 1.5 % — SIGNIFICANT CHANGE UP (ref 0–2)
BILIRUB SERPL-MCNC: 0.3 MG/DL — SIGNIFICANT CHANGE UP (ref 0.2–1.2)
BUN SERPL-MCNC: 72 MG/DL — HIGH (ref 7–18)
CALCIUM SERPL-MCNC: 8.7 MG/DL — SIGNIFICANT CHANGE UP (ref 8.4–10.5)
CHLORIDE SERPL-SCNC: 104 MMOL/L — SIGNIFICANT CHANGE UP (ref 96–108)
CO2 SERPL-SCNC: 25 MMOL/L — SIGNIFICANT CHANGE UP (ref 22–31)
CREAT SERPL-MCNC: 8.41 MG/DL — HIGH (ref 0.5–1.3)
EOSINOPHIL # BLD AUTO: 0.28 K/UL — SIGNIFICANT CHANGE UP (ref 0–0.5)
EOSINOPHIL NFR BLD AUTO: 6.1 % — HIGH (ref 0–6)
GLUCOSE SERPL-MCNC: 125 MG/DL — HIGH (ref 70–99)
HCG SERPL-ACNC: <1 MIU/ML — SIGNIFICANT CHANGE UP
HCG UR QL: NEGATIVE — SIGNIFICANT CHANGE UP
HCT VFR BLD CALC: 41.8 % — SIGNIFICANT CHANGE UP (ref 34.5–45)
HGB BLD-MCNC: 13.2 G/DL — SIGNIFICANT CHANGE UP (ref 11.5–15.5)
IMM GRANULOCYTES NFR BLD AUTO: 0.2 % — SIGNIFICANT CHANGE UP (ref 0–1.5)
LYMPHOCYTES # BLD AUTO: 1.81 K/UL — SIGNIFICANT CHANGE UP (ref 1–3.3)
LYMPHOCYTES # BLD AUTO: 39.6 % — SIGNIFICANT CHANGE UP (ref 13–44)
MAGNESIUM SERPL-MCNC: 2.2 MG/DL — SIGNIFICANT CHANGE UP (ref 1.6–2.6)
MCHC RBC-ENTMCNC: 26.7 PG — LOW (ref 27–34)
MCHC RBC-ENTMCNC: 31.6 GM/DL — LOW (ref 32–36)
MCV RBC AUTO: 84.6 FL — SIGNIFICANT CHANGE UP (ref 80–100)
MONOCYTES # BLD AUTO: 0.5 K/UL — SIGNIFICANT CHANGE UP (ref 0–0.9)
MONOCYTES NFR BLD AUTO: 10.9 % — SIGNIFICANT CHANGE UP (ref 2–14)
NEUTROPHILS # BLD AUTO: 1.9 K/UL — SIGNIFICANT CHANGE UP (ref 1.8–7.4)
NEUTROPHILS NFR BLD AUTO: 41.7 % — LOW (ref 43–77)
NRBC # BLD: 0 /100 WBCS — SIGNIFICANT CHANGE UP (ref 0–0)
PHOSPHATE SERPL-MCNC: 4.7 MG/DL — HIGH (ref 2.5–4.5)
PLATELET # BLD AUTO: 222 K/UL — SIGNIFICANT CHANGE UP (ref 150–400)
POTASSIUM SERPL-MCNC: 3.7 MMOL/L — SIGNIFICANT CHANGE UP (ref 3.5–5.3)
POTASSIUM SERPL-SCNC: 3.7 MMOL/L — SIGNIFICANT CHANGE UP (ref 3.5–5.3)
PROT SERPL-MCNC: 7.7 G/DL — SIGNIFICANT CHANGE UP (ref 6–8.3)
RBC # BLD: 4.94 M/UL — SIGNIFICANT CHANGE UP (ref 3.8–5.2)
RBC # FLD: 13.6 % — SIGNIFICANT CHANGE UP (ref 10.3–14.5)
SODIUM SERPL-SCNC: 140 MMOL/L — SIGNIFICANT CHANGE UP (ref 135–145)
WBC # BLD: 4.57 K/UL — SIGNIFICANT CHANGE UP (ref 3.8–10.5)
WBC # FLD AUTO: 4.57 K/UL — SIGNIFICANT CHANGE UP (ref 3.8–10.5)

## 2019-06-01 PROCEDURE — 99285 EMERGENCY DEPT VISIT HI MDM: CPT

## 2019-06-01 RX ORDER — BENZTROPINE MESYLATE 1 MG
0.5 TABLET ORAL
Refills: 0 | Status: DISCONTINUED | OUTPATIENT
Start: 2019-06-01 | End: 2019-06-07

## 2019-06-01 RX ORDER — INSULIN GLARGINE 100 [IU]/ML
15 INJECTION, SOLUTION SUBCUTANEOUS AT BEDTIME
Refills: 0 | Status: DISCONTINUED | OUTPATIENT
Start: 2019-06-01 | End: 2019-06-07

## 2019-06-01 RX ORDER — AMLODIPINE BESYLATE 2.5 MG/1
1 TABLET ORAL
Qty: 0 | Refills: 0 | DISCHARGE

## 2019-06-01 RX ORDER — FLUTICASONE PROPIONATE 50 MCG
1 SPRAY, SUSPENSION NASAL
Refills: 0 | Status: DISCONTINUED | OUTPATIENT
Start: 2019-06-01 | End: 2019-06-07

## 2019-06-01 RX ORDER — HEPARIN SODIUM 5000 [USP'U]/ML
5000 INJECTION INTRAVENOUS; SUBCUTANEOUS EVERY 12 HOURS
Refills: 0 | Status: DISCONTINUED | OUTPATIENT
Start: 2019-06-01 | End: 2019-06-07

## 2019-06-01 RX ORDER — LEVOTHYROXINE SODIUM 125 MCG
25 TABLET ORAL DAILY
Refills: 0 | Status: DISCONTINUED | OUTPATIENT
Start: 2019-06-01 | End: 2019-06-07

## 2019-06-01 RX ORDER — ASPIRIN/CALCIUM CARB/MAGNESIUM 324 MG
81 TABLET ORAL DAILY
Refills: 0 | Status: DISCONTINUED | OUTPATIENT
Start: 2019-06-01 | End: 2019-06-03

## 2019-06-01 RX ORDER — BENZTROPINE MESYLATE 1 MG
0 TABLET ORAL
Qty: 0 | Refills: 0 | DISCHARGE

## 2019-06-01 RX ORDER — INSULIN LISPRO 100/ML
VIAL (ML) SUBCUTANEOUS
Refills: 0 | Status: DISCONTINUED | OUTPATIENT
Start: 2019-06-01 | End: 2019-06-07

## 2019-06-01 RX ADMIN — HEPARIN SODIUM 5000 UNIT(S): 5000 INJECTION INTRAVENOUS; SUBCUTANEOUS at 22:40

## 2019-06-01 RX ADMIN — Medication 1 SPRAY(S): at 22:42

## 2019-06-01 RX ADMIN — Medication 0.5 MILLIGRAM(S): at 22:41

## 2019-06-01 RX ADMIN — Medication 1: at 22:42

## 2019-06-01 RX ADMIN — INSULIN GLARGINE 15 UNIT(S): 100 INJECTION, SOLUTION SUBCUTANEOUS at 22:41

## 2019-06-01 NOTE — H&P ADULT - NSHPPHYSICALEXAM_GEN_ALL_CORE
T(C): 36.8 (01 Jun 2019 10:46), Max: 36.8 (31 May 2019 16:52)  T(F): 98.2 (01 Jun 2019 10:46), Max: 98.2 (31 May 2019 16:52)  HR: 54 (01 Jun 2019 10:46) (54 - 78)  BP: 98/64 (01 Jun 2019 10:46) (98/64 - 130/65)  RR: 18 (01 Jun 2019 10:46) (18 - 20)  SpO2: 98% (31 May 2019 21:06) (98% - 98%)

## 2019-06-01 NOTE — H&P ADULT - ASSESSMENT
54 y/o F PMH ESRD (2/2019, unknown etiology, on HD via R-PC at [facility]), DM, HLD, and Bipolar Disorder presents from [facility] for missed hemodialysis 2/2 malfunctioning PC - now functioning - admitting for HD

## 2019-06-01 NOTE — ED PROVIDER NOTE - PROGRESS NOTE DETAILS
Attempted to contact dialysis center multiple times, no answer. Spoke w Dr Mendez 2x (himself tried to contact dialysis center - unsuccessful). Will admit for inpatient dialysis. Dr Mendez recommend admission to Dr Sanchez, he will contact, [address]

## 2019-06-01 NOTE — H&P ADULT - NSICDXFAMILYHX_GEN_ALL_CORE_FT
FAMILY HISTORY:  Father  Still living? Unknown  Family history of diabetes mellitus, Age at diagnosis: Age Unknown

## 2019-06-01 NOTE — ED ADULT NURSE NOTE - NSIMPLEMENTINTERV_GEN_ALL_ED
Implemented All Universal Safety Interventions:  [place] to call system. Call bell, personal items and telephone within reach. Instruct patient to call for assistance. Room bathroom lighting operational. Non-slip footwear when patient is off stretcher. Physically safe environment: no spills, clutter or unnecessary equipment. Stretcher in lowest position, wheels locked, appropriate side rails in place.

## 2019-06-01 NOTE — ED PROVIDER NOTE - OBJECTIVE STATEMENT
52 y/o female with PMHx od CKD (recently started on dialysis in the past year), schizophrenia, bipolar, CAD, DM presents to the ED from [facility] for dialysis x today. Pt notes she had issues with placement of Shiley cath and then permacath placed. Currently under care of vascular surgeon for AV graft/fistula. Pt was seen in ED x yesterday for clotted dialysis cath, had it flushed; cath was patent and pt was d/c home. Pt sent in today from assisted living for dialysis. Pt was initially scheduled for dialysis x yesterday but pt missed it. Pt currently asymptomatic in the ED, vital signs WNL. Pt denies fever, chills, or any other complaints NKDA.

## 2019-06-01 NOTE — H&P ADULT - PROBLEM SELECTOR PLAN 1
Missed HD Friday 2/2 clotted PC  - Afebrile and CBC/BMP WNLs; no emergent indications for HD  - Permacath is now flowing; plan for HD today  Nephrology Dr Herzog

## 2019-06-01 NOTE — ED ADULT NURSE NOTE - OBJECTIVE STATEMENT
pt is here for dialysis.  pt stated that dialysis days are M/W/F, Friday couldn't do it d/t clot, dialysis port on right chest wall, pt calm at this time.

## 2019-06-01 NOTE — H&P ADULT - NSICDXPASTMEDICALHX_GEN_ALL_CORE_FT
PAST MEDICAL HISTORY:  Bipolar disorder     CAD (coronary artery disease)     Diabetes     ESRD on dialysis     Schizophrenia

## 2019-06-01 NOTE — CONSULT NOTE ADULT - SUBJECTIVE AND OBJECTIVE BOX
HPI:  54 y/o F PMH ESRD (2/2019, unknown etiology, on HD via R-PC at [facility]), DM, HLD, and Bipolar Disorder presents from [facility] for missed hemodialysis - patient in ED the day prior from HD center 2/2 clogged Permacath which was flushed in ED and hence discharged back. Patient returned today 2/2 [facility] HD center being closed on weekend. Otherwise patient no acute complaints. Patient planned for AVF in July.     PMH:   ESRD on dialysis  CAD (coronary artery disease)  Bipolar disorder  Schizophrenia  Diabetes    PSH:   No significant past surgical history    FAMILY HISTORY:  Family history of diabetes mellitus    Social History:  non-smoker/ non-alcoholic     Home Meds:  MEDICATIONS  (STANDING):  aspirin enteric coated 81 milliGRAM(s) Oral daily  benztropine 0.5 milliGRAM(s) Oral two times a day  fluticasone propionate 50 MICROgram(s)/spray Nasal Spray 1 Spray(s) Both Nostrils two times a day  heparin  Injectable 5000 Unit(s) SubCutaneous every 12 hours  insulin glargine Injectable (LANTUS) 15 Unit(s) SubCutaneous at bedtime  insulin lispro (HumaLOG) corrective regimen sliding scale   SubCutaneous Before meals and at bedtime  levothyroxine 25 MICROGram(s) Oral daily    Allergies:  No Known Allergies    REVIEW OF SYSTEMS:    CONSTITUTIONAL: No fever or chills  EYES: No eye pain or discharge  ENMT:  No throat pain  NECK: No pain or stiffness  RESPIRATORY: No cough. No shortness of breath  CARDIOVASCULAR: No chest pain, palpitations or leg swelling  GASTROINTESTINAL: No abdominal or epigastric pain. No nausea, vomiting, or diarrhea  GENITOURINARY: No hematuria  NEUROLOGICAL: No headaches  SKIN: No itching, burning, rashes    Vital Signs Last 24 Hrs  T(C): 36.4 (01 Jun 2019 17:00), Max: 36.8 (01 Jun 2019 10:46)  T(F): 97.5 (01 Jun 2019 17:00), Max: 98.2 (01 Jun 2019 10:46)  HR: 51 (01 Jun 2019 17:00) (51 - 78)  BP: 110/62 (01 Jun 2019 17:00) (98/64 - 130/65)  BP(mean): --  RR: 17 (01 Jun 2019 17:00) (16 - 20)  SpO2: 98% (01 Jun 2019 17:00) (98% - 100%)    PHYSICAL EXAM:    GENERAL: NAD, well-nourished, well-developed  HEAD:  Atraumatic, Normocephalic  EYES: Sclera anicteric  ENMT: Moist mucous membranes  NECK: Supple, No JVD  NERVOUS SYSTEM:  Alert & Oriented X3  CHEST/LUNG: Clear to auscultation  HEART: Regular rate and rhythm; No murmurs  ABDOMEN: Soft, Nontender, Nondistended; Bowel sounds present  EXTREMITIES:  No edema  SKIN: Normal turgor    LABS:                        13.2   4.57  )-----------( 222      ( 01 Jun 2019 13:50 )             41.8     06-01    140  |  104  |  72<H>  ----------------------------<  125<H>  3.7   |  25  |  8.41<H>    Ca    8.7      01 Jun 2019 13:50  Phos  4.7     06-01  Mg     2.2     06-01    TPro  7.7  /  Alb  3.5  /  TBili  0.3  /  DBili  x   /  AST  22  /  ALT  28  /  AlkPhos  141<H>  06-01    ASSESSMENT AND PLAN:  1. ESRD on HD  2. Malfunctioning P/C with poor BFR  3. Anemia due to CKD. No need for Epogen.  4. MBD: PO4 is acceptable. No need for Rx  Tolerating HD  Vascular surgery for P/c replacement  Keep patient euvolemic and renal diet  Avoid Nephrtoxic Meds/ Agents such as NSAIDs, Gadolinium contrast, Phosphate containing enemas, etc..)  Adjust Medications according to eGFR  Follow H/H  Many thanks.

## 2019-06-01 NOTE — ED PROVIDER NOTE - CLINICAL SUMMARY MEDICAL DECISION MAKING FREE TEXT BOX
53 F with CKD in need of dialysis, missed session yesterday. Labs WNL x yesterday. Will attempt to contact PMD and dialysis center. If unable to arrange for dialysis x today or tomorrow, will admit for inpatient dialysis.

## 2019-06-01 NOTE — H&P ADULT - NEUROLOGICAL DETAILS
sensation intact/responds to pain/responds to verbal commands/alert and oriented x 3
withdrawal from social contact/decreased activity level

## 2019-06-01 NOTE — H&P ADULT - HISTORY OF PRESENT ILLNESS
52 y/o F PMH ESRD (2/2019, unknown etiology, on HD via R-PC at [facility]), DM, HLD, and Bipolar Disorder presents from [facility] for missed hemodialysis - patient in ED the day prior from HD center 2/2 clogged Permacath which was flushed in ED and hence discharged back. Patient returned today 2/2 [facility] HD center being closed on weekend. Otherwise patient no acute complaints. Patient planned for AVF in July.

## 2019-06-02 LAB
ANION GAP SERPL CALC-SCNC: 11 MMOL/L — SIGNIFICANT CHANGE UP (ref 5–17)
BASOPHILS # BLD AUTO: 0.08 K/UL — SIGNIFICANT CHANGE UP (ref 0–0.2)
BASOPHILS NFR BLD AUTO: 1.9 % — SIGNIFICANT CHANGE UP (ref 0–2)
BUN SERPL-MCNC: 46 MG/DL — HIGH (ref 7–18)
CALCIUM SERPL-MCNC: 8.7 MG/DL — SIGNIFICANT CHANGE UP (ref 8.4–10.5)
CHLORIDE SERPL-SCNC: 100 MMOL/L — SIGNIFICANT CHANGE UP (ref 96–108)
CHOLEST SERPL-MCNC: 137 MG/DL — SIGNIFICANT CHANGE UP (ref 10–199)
CO2 SERPL-SCNC: 27 MMOL/L — SIGNIFICANT CHANGE UP (ref 22–31)
CREAT SERPL-MCNC: 5.97 MG/DL — HIGH (ref 0.5–1.3)
EOSINOPHIL # BLD AUTO: 0.3 K/UL — SIGNIFICANT CHANGE UP (ref 0–0.5)
EOSINOPHIL NFR BLD AUTO: 6.9 % — HIGH (ref 0–6)
FOLATE SERPL-MCNC: 7.4 NG/ML — SIGNIFICANT CHANGE UP
GLUCOSE BLDC GLUCOMTR-MCNC: 164 MG/DL — HIGH (ref 70–99)
GLUCOSE BLDC GLUCOMTR-MCNC: 192 MG/DL — HIGH (ref 70–99)
GLUCOSE BLDC GLUCOMTR-MCNC: 202 MG/DL — HIGH (ref 70–99)
GLUCOSE BLDC GLUCOMTR-MCNC: 64 MG/DL — LOW (ref 70–99)
GLUCOSE BLDC GLUCOMTR-MCNC: 89 MG/DL — SIGNIFICANT CHANGE UP (ref 70–99)
GLUCOSE SERPL-MCNC: 63 MG/DL — LOW (ref 70–99)
HAV IGM SER-ACNC: SIGNIFICANT CHANGE UP
HBA1C BLD-MCNC: 7.1 % — HIGH (ref 4–5.6)
HBV CORE IGM SER-ACNC: SIGNIFICANT CHANGE UP
HBV SURFACE AG SER-ACNC: REACTIVE
HCT VFR BLD CALC: 40.6 % — SIGNIFICANT CHANGE UP (ref 34.5–45)
HCV AB S/CO SERPL IA: 0.1 S/CO — SIGNIFICANT CHANGE UP (ref 0–0.99)
HCV AB SERPL-IMP: SIGNIFICANT CHANGE UP
HDLC SERPL-MCNC: 42 MG/DL — LOW
HGB BLD-MCNC: 12.6 G/DL — SIGNIFICANT CHANGE UP (ref 11.5–15.5)
IMM GRANULOCYTES NFR BLD AUTO: 0.2 % — SIGNIFICANT CHANGE UP (ref 0–1.5)
LIPID PNL WITH DIRECT LDL SERPL: 69 MG/DL — SIGNIFICANT CHANGE UP
LYMPHOCYTES # BLD AUTO: 1.87 K/UL — SIGNIFICANT CHANGE UP (ref 1–3.3)
LYMPHOCYTES # BLD AUTO: 43.3 % — SIGNIFICANT CHANGE UP (ref 13–44)
MAGNESIUM SERPL-MCNC: 2 MG/DL — SIGNIFICANT CHANGE UP (ref 1.6–2.6)
MCHC RBC-ENTMCNC: 26.3 PG — LOW (ref 27–34)
MCHC RBC-ENTMCNC: 31 GM/DL — LOW (ref 32–36)
MCV RBC AUTO: 84.6 FL — SIGNIFICANT CHANGE UP (ref 80–100)
MONOCYTES # BLD AUTO: 0.43 K/UL — SIGNIFICANT CHANGE UP (ref 0–0.9)
MONOCYTES NFR BLD AUTO: 10 % — SIGNIFICANT CHANGE UP (ref 2–14)
NEUTROPHILS # BLD AUTO: 1.63 K/UL — LOW (ref 1.8–7.4)
NEUTROPHILS NFR BLD AUTO: 37.7 % — LOW (ref 43–77)
NRBC # BLD: 0 /100 WBCS — SIGNIFICANT CHANGE UP (ref 0–0)
PHOSPHATE SERPL-MCNC: 5.6 MG/DL — HIGH (ref 2.5–4.5)
PLATELET # BLD AUTO: 187 K/UL — SIGNIFICANT CHANGE UP (ref 150–400)
POTASSIUM SERPL-MCNC: 3 MMOL/L — LOW (ref 3.5–5.3)
POTASSIUM SERPL-SCNC: 3 MMOL/L — LOW (ref 3.5–5.3)
RBC # BLD: 4.8 M/UL — SIGNIFICANT CHANGE UP (ref 3.8–5.2)
RBC # FLD: 13.4 % — SIGNIFICANT CHANGE UP (ref 10.3–14.5)
SODIUM SERPL-SCNC: 138 MMOL/L — SIGNIFICANT CHANGE UP (ref 135–145)
TOTAL CHOLESTEROL/HDL RATIO MEASUREMENT: 3.3 RATIO — SIGNIFICANT CHANGE UP (ref 3.3–7.1)
TRIGL SERPL-MCNC: 132 MG/DL — SIGNIFICANT CHANGE UP (ref 10–149)
TSH SERPL-MCNC: 1.12 UU/ML — SIGNIFICANT CHANGE UP (ref 0.34–4.82)
VIT B12 SERPL-MCNC: 676 PG/ML — SIGNIFICANT CHANGE UP (ref 232–1245)
WBC # BLD: 4.32 K/UL — SIGNIFICANT CHANGE UP (ref 3.8–10.5)
WBC # FLD AUTO: 4.32 K/UL — SIGNIFICANT CHANGE UP (ref 3.8–10.5)

## 2019-06-02 RX ORDER — CHLORHEXIDINE GLUCONATE 213 G/1000ML
1 SOLUTION TOPICAL DAILY
Refills: 0 | Status: DISCONTINUED | OUTPATIENT
Start: 2019-06-02 | End: 2019-06-07

## 2019-06-02 RX ADMIN — Medication 2: at 21:40

## 2019-06-02 RX ADMIN — Medication 1 SPRAY(S): at 17:30

## 2019-06-02 RX ADMIN — Medication 0.5 MILLIGRAM(S): at 17:30

## 2019-06-02 RX ADMIN — Medication 25 MICROGRAM(S): at 05:56

## 2019-06-02 RX ADMIN — Medication 0.5 MILLIGRAM(S): at 05:56

## 2019-06-02 RX ADMIN — Medication 81 MILLIGRAM(S): at 12:09

## 2019-06-02 RX ADMIN — Medication 1: at 17:29

## 2019-06-02 RX ADMIN — Medication 1 SPRAY(S): at 05:55

## 2019-06-02 RX ADMIN — HEPARIN SODIUM 5000 UNIT(S): 5000 INJECTION INTRAVENOUS; SUBCUTANEOUS at 05:55

## 2019-06-02 RX ADMIN — INSULIN GLARGINE 15 UNIT(S): 100 INJECTION, SOLUTION SUBCUTANEOUS at 21:40

## 2019-06-02 NOTE — PROGRESS NOTE ADULT - ATTENDING COMMENTS
Patient was seen and examined,interim events noted,labs and radiology studies reviewed.  Marshall Sanchez MD,FACC.  [address] [place] [address].  [id number].  [phone]

## 2019-06-02 NOTE — PROGRESS NOTE ADULT - SUBJECTIVE AND OBJECTIVE BOX
52 y/o F PMH ESRD (2/2019, unknown etiology, on HD via R-PC at [facility]), DM, HLD, and Bipolar Disorder presents from [facility] for missed hemodialysis -    CC: Patient denies CP, SOB, n/v/d, fever or chills.    Vital Signs Last 24 Hrs  T(C): 36.4 (02 Jun 2019 16:02), Max: 36.4 (02 Jun 2019 16:02)  T(F): 97.6 (02 Jun 2019 16:02), Max: 97.6 (02 Jun 2019 16:02)  HR: 93 (02 Jun 2019 16:02) (51 - 93)  BP: 104/61 (02 Jun 2019 16:02) (96/46 - 138/80)  RR: 16 (02 Jun 2019 16:02) (16 - 18)  SpO2: 97% (02 Jun 2019 16:02) (97% - 99%)    PHYSICAL EXAM:  GENERAL: NAD, well-nourished, well-developed  HEAD:  Atraumatic, Normocephalic  EYES: Sclera anicteric  ENMT: Moist mucous membranes  NECK: Supple, No JVD  NERVOUS SYSTEM:  Alert & Oriented X3  CHEST/LUNG: Clear to auscultation  HEART: Regular rate and rhythm; 2/6 systolic  murmur.  ABDOMEN: Soft, Nontender, Nondistended; Bowel sounds present  EXTREMITIES:  No edema  SKIN: Normal turgor    MEDICATIONS:  aspirin enteric coated 81 milliGRAM(s) Oral daily  benztropine 0.5 milliGRAM(s) Oral two times a day  fluticasone propionate 50 MICROgram(s)/spray Nasal Spray 1 Spray(s) Both Nostrils two times a day  heparin  Injectable 5000 Unit(s) SubCutaneous every 12 hours  insulin glargine Injectable (LANTUS) 15 Unit(s) SubCutaneous at bedtime  insulin lispro (HumaLOG) corrective regimen sliding scale   SubCutaneous Before meals and at bedtime  levothyroxine 25 MICROGram(s) Oral daily      LABS:                        12.6   4.32  )-----------( 187      ( 02 Jun 2019 06:54 )             40.6     06-02    138  |  100  |  46<H>  ----------------------------<  63<L>  3.0<L>   |  27  |  5.97<H>    Ca    8.7      02 Jun 2019 06:54  Phos  5.6     06-02  Mg     2.0     06-02    TPro  7.7  /  Alb  3.5  /  TBili  0.3  /  DBili  x   /  AST  22  /  ALT  28  /  AlkPhos  141<H>  06-01    ASSESSMENT AND PLAN:     52 y/o F PMH ESRD (2/2019, unknown etiology, on HD via R-PC at [facility]), DM, HLD, and Bipolar Disorder presents from [facility] for missed hemodialysis 2/2 malfunctioning PC - now functioning - admitting for HD    Problem/Plan - 1:  ·  Problem: ESRD on dialysis.  Plan: Missed HD Friday 2/2 clotted PC  - Afebrile and CBC/BMP WNLs; no emergent indications for HD  - Permacath is now flowing; plan for HD today  Nephrology consult noted.  Vascular for access replacement.  Problem/Plan - 2:  ·  Problem: Diabetes.  Plan: Resumed Lantus 15 U; C/w diabetic diet w/ ISS  - Monitor FS   Hemoglobin A1C, Whole Blood (06.02.19 @ 13:13)    Hemoglobin A1C, Whole Blood: 7.1 %    Problem/Plan - 3:  ·  Problem: Bipolar disorder.  Plan: On 3 month interval IM antipsychotic  - Mood is currently pleasant.

## 2019-06-02 NOTE — PROGRESS NOTE ADULT - SUBJECTIVE AND OBJECTIVE BOX
CC: Patient denies CP, SOB, n/v/d, fever or chills.    Vital Signs Last 24 Hrs  T(C): 36.4 (02 Jun 2019 16:02), Max: 36.4 (02 Jun 2019 16:02)  T(F): 97.6 (02 Jun 2019 16:02), Max: 97.6 (02 Jun 2019 16:02)  HR: 93 (02 Jun 2019 16:02) (51 - 93)  BP: 104/61 (02 Jun 2019 16:02) (96/46 - 138/80)  BP(mean): --  RR: 16 (02 Jun 2019 16:02) (16 - 18)  SpO2: 97% (02 Jun 2019 16:02) (97% - 99%)    PHYSICAL EXAM:  GENERAL: NAD, well-nourished, well-developed  HEAD:  Atraumatic, Normocephalic  EYES: Sclera anicteric  ENMT: Moist mucous membranes  NECK: Supple, No JVD  NERVOUS SYSTEM:  Alert & Oriented X3  CHEST/LUNG: Clear to auscultation  HEART: Regular rate and rhythm; No murmurs  ABDOMEN: Soft, Nontender, Nondistended; Bowel sounds present  EXTREMITIES:  No edema  SKIN: Normal turgor    MEDICATIONS:  aspirin enteric coated 81 milliGRAM(s) Oral daily  benztropine 0.5 milliGRAM(s) Oral two times a day  fluticasone propionate 50 MICROgram(s)/spray Nasal Spray 1 Spray(s) Both Nostrils two times a day  heparin  Injectable 5000 Unit(s) SubCutaneous every 12 hours  insulin glargine Injectable (LANTUS) 15 Unit(s) SubCutaneous at bedtime  insulin lispro (HumaLOG) corrective regimen sliding scale   SubCutaneous Before meals and at bedtime  levothyroxine 25 MICROGram(s) Oral daily      LABS:                        12.6   4.32  )-----------( 187      ( 02 Jun 2019 06:54 )             40.6     06-02    138  |  100  |  46<H>  ----------------------------<  63<L>  3.0<L>   |  27  |  5.97<H>    Ca    8.7      02 Jun 2019 06:54  Phos  5.6     06-02  Mg     2.0     06-02    TPro  7.7  /  Alb  3.5  /  TBili  0.3  /  DBili  x   /  AST  22  /  ALT  28  /  AlkPhos  141<H>  06-01    ASSESSMENT AND PLAN:     1. ESRD on HD  2. Malfunctioning P/C with poor BFR  3. Anemia due to CKD. No need for Epogen.  4. MBD: PO4 is acceptable. No need for Rx  HD is scheduled for tomorrow  Vascular surgery/IR for P/C replacement  Keep patient euvolemic and renal diet  Avoid Nephrtoxic Meds/ Agents such as NSAIDs, Gadolinium contrast, Phosphate containing enemas, etc..)  Adjust Medications according to eGFR  Follow H/H

## 2019-06-03 LAB
ANION GAP SERPL CALC-SCNC: 11 MMOL/L — SIGNIFICANT CHANGE UP (ref 5–17)
APTT BLD: 34.5 SEC — SIGNIFICANT CHANGE UP (ref 27.5–36.3)
BUN SERPL-MCNC: 60 MG/DL — HIGH (ref 7–18)
CALCIUM SERPL-MCNC: 8.9 MG/DL — SIGNIFICANT CHANGE UP (ref 8.4–10.5)
CHLORIDE SERPL-SCNC: 100 MMOL/L — SIGNIFICANT CHANGE UP (ref 96–108)
CO2 SERPL-SCNC: 26 MMOL/L — SIGNIFICANT CHANGE UP (ref 22–31)
CREAT SERPL-MCNC: 7.1 MG/DL — HIGH (ref 0.5–1.3)
GLUCOSE BLDC GLUCOMTR-MCNC: 110 MG/DL — HIGH (ref 70–99)
GLUCOSE BLDC GLUCOMTR-MCNC: 117 MG/DL — HIGH (ref 70–99)
GLUCOSE BLDC GLUCOMTR-MCNC: 126 MG/DL — HIGH (ref 70–99)
GLUCOSE BLDC GLUCOMTR-MCNC: 190 MG/DL — HIGH (ref 70–99)
GLUCOSE BLDC GLUCOMTR-MCNC: 82 MG/DL — SIGNIFICANT CHANGE UP (ref 70–99)
GLUCOSE SERPL-MCNC: 174 MG/DL — HIGH (ref 70–99)
HCT VFR BLD CALC: 38.9 % — SIGNIFICANT CHANGE UP (ref 34.5–45)
HGB BLD-MCNC: 12.2 G/DL — SIGNIFICANT CHANGE UP (ref 11.5–15.5)
INR BLD: 0.96 RATIO — SIGNIFICANT CHANGE UP (ref 0.88–1.16)
MAGNESIUM SERPL-MCNC: 2.1 MG/DL — SIGNIFICANT CHANGE UP (ref 1.6–2.6)
MCHC RBC-ENTMCNC: 26.2 PG — LOW (ref 27–34)
MCHC RBC-ENTMCNC: 31.4 GM/DL — LOW (ref 32–36)
MCV RBC AUTO: 83.7 FL — SIGNIFICANT CHANGE UP (ref 80–100)
NRBC # BLD: 0 /100 WBCS — SIGNIFICANT CHANGE UP (ref 0–0)
PHOSPHATE SERPL-MCNC: 5.6 MG/DL — HIGH (ref 2.5–4.5)
PLATELET # BLD AUTO: 194 K/UL — SIGNIFICANT CHANGE UP (ref 150–400)
POTASSIUM SERPL-MCNC: 3.5 MMOL/L — SIGNIFICANT CHANGE UP (ref 3.5–5.3)
POTASSIUM SERPL-SCNC: 3.5 MMOL/L — SIGNIFICANT CHANGE UP (ref 3.5–5.3)
PROTHROM AB SERPL-ACNC: 10.6 SEC — SIGNIFICANT CHANGE UP (ref 10–12.9)
RBC # BLD: 4.65 M/UL — SIGNIFICANT CHANGE UP (ref 3.8–5.2)
RBC # FLD: 13.6 % — SIGNIFICANT CHANGE UP (ref 10.3–14.5)
SODIUM SERPL-SCNC: 137 MMOL/L — SIGNIFICANT CHANGE UP (ref 135–145)
WBC # BLD: 3.64 K/UL — LOW (ref 3.8–10.5)
WBC # FLD AUTO: 3.64 K/UL — LOW (ref 3.8–10.5)

## 2019-06-03 RX ORDER — CALCIUM ACETATE 667 MG
667 TABLET ORAL
Refills: 0 | Status: DISCONTINUED | OUTPATIENT
Start: 2019-06-03 | End: 2019-06-07

## 2019-06-03 RX ORDER — CHLORHEXIDINE GLUCONATE 213 G/1000ML
1 SOLUTION TOPICAL DAILY
Refills: 0 | Status: DISCONTINUED | OUTPATIENT
Start: 2019-06-03 | End: 2019-06-07

## 2019-06-03 RX ADMIN — Medication 0.5 MILLIGRAM(S): at 05:24

## 2019-06-03 RX ADMIN — Medication 25 MICROGRAM(S): at 05:23

## 2019-06-03 RX ADMIN — Medication 1 SPRAY(S): at 17:21

## 2019-06-03 RX ADMIN — HEPARIN SODIUM 5000 UNIT(S): 5000 INJECTION INTRAVENOUS; SUBCUTANEOUS at 17:21

## 2019-06-03 RX ADMIN — Medication 667 MILLIGRAM(S): at 12:43

## 2019-06-03 RX ADMIN — CHLORHEXIDINE GLUCONATE 1 APPLICATION(S): 213 SOLUTION TOPICAL at 11:13

## 2019-06-03 RX ADMIN — INSULIN GLARGINE 15 UNIT(S): 100 INJECTION, SOLUTION SUBCUTANEOUS at 21:28

## 2019-06-03 RX ADMIN — Medication 667 MILLIGRAM(S): at 17:21

## 2019-06-03 RX ADMIN — Medication 0.5 MILLIGRAM(S): at 17:21

## 2019-06-03 RX ADMIN — CHLORHEXIDINE GLUCONATE 1 APPLICATION(S): 213 SOLUTION TOPICAL at 16:42

## 2019-06-03 RX ADMIN — Medication 1: at 12:01

## 2019-06-03 RX ADMIN — Medication 1 SPRAY(S): at 05:23

## 2019-06-03 RX ADMIN — HEPARIN SODIUM 5000 UNIT(S): 5000 INJECTION INTRAVENOUS; SUBCUTANEOUS at 05:23

## 2019-06-03 NOTE — PROGRESS NOTE ADULT - SUBJECTIVE AND OBJECTIVE BOX
pt seen and examined.pts current chart reviewed and case discussed with resident covering.    SUBJECTIVE:  pt feels fine and denies cp,sob,gi or gu/uremic  symptons    REVIEW OF SYSTEMS:  CONSTITUTIONAL: No weakness, fevers or chills  EYES/ENT: No visual changes;  No vertigo or throat pain   NECK: No pain or stiffness  RESPIRATORY: No cough, wheezing, hemoptysis; No shortness of breath  CARDIOVASCULAR: No chest pain or palpitations  GASTROINTESTINAL: No abdominal or epigastric pain. No nausea, vomiting, or hematemesis; No diarrhea or constipation. No melena or hematochezia.  GENITOURINARY: No dysuria, frequency , hematuria, flank pain or nocturia  NEUROLOGICAL: No numbness or weakness  SKIN: No itching, burning, rashes, or lesions   All other review of systems is negative unless indicated above    Current meds:    benztropine 0.5 milliGRAM(s) Oral two times a day  calcium acetate 667 milliGRAM(s) Oral three times a day with meals  chlorhexidine 2% Cloths 1 Application(s) Topical daily  fluticasone propionate 50 MICROgram(s)/spray Nasal Spray 1 Spray(s) Both Nostrils two times a day  heparin  Injectable 5000 Unit(s) SubCutaneous every 12 hours  insulin glargine Injectable (LANTUS) 15 Unit(s) SubCutaneous at bedtime  insulin lispro (HumaLOG) corrective regimen sliding scale   SubCutaneous Before meals and at bedtime  levothyroxine 25 MICROGram(s) Oral daily      Vital Signs    T(F): 97.6 (06-03-19 @ 08:10), Max: 97.7 (06-02-19 @ 23:52)  HR: 78 (06-03-19 @ 08:10) (65 - 93)  BP: 129/66 (06-03-19 @ 08:10) (93/55 - 129/66)  ABP: --  RR: 16 (06-03-19 @ 08:10) (16 - 16)  SpO2: 100% (06-03-19 @ 08:10) (97% - 100%)  Wt(kg): --  CVP(cm H2O): --  CO: --  PCWP: --    I and O's:    Daily Height in cm: 149.86 (03 Jun 2019 08:59)    Daily     PHYSICAL EXAM:  GENERAL: NAD, well-nourished, well-developed  HEAD:  Atraumatic, Normocephalic  EYES: Sclera anicteric  ENMT: Moist mucous membranes  NECK: Supple, No JVD  NERVOUS SYSTEM:  Alert & Oriented X3  CHEST/LUNG: Clear to auscultation  HEART: Regular rate and rhythm; No murmurs  ABDOMEN: Soft, Nontender, Nondistended; Bowel sounds present  EXTREMITIES:  No edema  SKIN: Normal turgor      LABS:    CBC:                          12.2   3.64  )-----------( 194      ( 03 Jun 2019 12:38 )             38.9           BMP:    06-03    137  |  100  |  60<H>  ----------------------------<  174<H>  3.5   |  26  |  7.10<H>  06-02    138  |  100  |  46<H>  ----------------------------<  63<L>  3.0<L>   |  27  |  5.97<H>  06-01    140  |  104  |  72<H>  ----------------------------<  125<H>  3.7   |  25  |  8.41<H>  05-31    135  |  98  |  56<H>  ----------------------------<  244<H>  3.2<L>   |  27  |  7.49<H>    Ca    8.9      03 Jun 2019 12:38  Ca    8.7      02 Jun 2019 06:54  Ca    8.7      01 Jun 2019 13:50  Ca    8.6      31 May 2019 18:23  Phos  5.6     06-03  Phos  5.6     06-02  Phos  4.7     06-01  Mg     2.1     06-03  Mg     2.0     06-02  Mg     2.2     06-01    TPro  7.7  /  Alb  3.5  /  TBili  0.3  /  DBili  x   /  AST  22  /  ALT  28  /  AlkPhos  141<H>  06-01  TPro  7.7  /  Alb  3.3<L>  /  TBili  0.2  /  DBili  x   /  AST  22  /  ALT  25  /  AlkPhos  151<H>  05-31      Thyroid Stimulating Hormone, Serum: 1.12 uU/mL (06-02 @ 06:54)      URINE STUDIES:                        RADIOLOGY & ADDITIONAL STUDIES: pt seen and examined.pts current chart reviewed and case discussed with resident covering  pt with Non functioning PC and esrd.  SUBJECTIVE:  pt feels fine and denies cp,sob,gi or uremic  symptons  pt is awaiting for PC exchange  REVIEW OF SYSTEMS:  CONSTITUTIONAL: No weakness, fevers or chills  EYES/ENT: No visual changes;  No vertigo or throat pain   NECK: No pain or stiffness  RESPIRATORY: No cough, wheezing, hemoptysis; No shortness of breath  CARDIOVASCULAR: No chest pain or palpitations  GASTROINTESTINAL: No abdominal or epigastric pain. No nausea, vomiting, or hematemesis; No diarrhea or constipation. No melena or hematochezia.  GENITOURINARY: No dysuria, frequency , hematuria, flank pain or nocturia  NEUROLOGICAL: No numbness or weakness  SKIN: No itching, burning, rashes, or lesions   All other review of systems is negative unless indicated above    Current meds:    benztropine 0.5 milliGRAM(s) Oral two times a day  calcium acetate 667 milliGRAM(s) Oral three times a day with meals  chlorhexidine 2% Cloths 1 Application(s) Topical daily  fluticasone propionate 50 MICROgram(s)/spray Nasal Spray 1 Spray(s) Both Nostrils two times a day  heparin  Injectable 5000 Unit(s) SubCutaneous every 12 hours  insulin glargine Injectable (LANTUS) 15 Unit(s) SubCutaneous at bedtime  insulin lispro (HumaLOG) corrective regimen sliding scale   SubCutaneous Before meals and at bedtime  levothyroxine 25 MICROGram(s) Oral daily      Vital Signs    T(F): 97.6 (06-03-19 @ 08:10), Max: 97.7 (06-02-19 @ 23:52)  HR: 78 (06-03-19 @ 08:10) (65 - 93)  BP: 129/66 (06-03-19 @ 08:10) (93/55 - 129/66)  ABP: --  RR: 16 (06-03-19 @ 08:10) (16 - 16)  SpO2: 100% (06-03-19 @ 08:10) (97% - 100%)  Wt(kg): --  CVP(cm H2O): --  CO: --  PCWP: --    I and O's:    Daily Height in cm: 149.86 (03 Jun 2019 08:59)    Daily     PHYSICAL EXAM:  GENERAL: NAD, well-nourished, well-developed  HEAD:  Atraumatic, Normocephalic  EYES: Sclera anicteric  ENMT: Moist mucous membranes  NECK: Supple, No JVD  Rt chest PC noted  NERVOUS SYSTEM:  Alert & Oriented X3  CHEST/LUNG: Clear to auscultation  HEART: Regular rate and rhythm; No murmurs  ABDOMEN: Soft, Nontender, Nondistended; Bowel sounds present  EXTREMITIES:  No edema  SKIN: Normal turgor      LABS:    CBC:                          12.2   3.64  )-----------( 194      ( 03 Jun 2019 12:38 )             38.9           BMP:    06-03    137  |  100  |  60<H>  ----------------------------<  174<H>  3.5   |  26  |  7.10<H>  06-02    138  |  100  |  46<H>  ----------------------------<  63<L>  3.0<L>   |  27  |  5.97<H>  06-01    140  |  104  |  72<H>  ----------------------------<  125<H>  3.7   |  25  |  8.41<H>  05-31    135  |  98  |  56<H>  ----------------------------<  244<H>  3.2<L>   |  27  |  7.49<H>    Ca    8.9      03 Jun 2019 12:38  Ca    8.7      02 Jun 2019 06:54  Ca    8.7      01 Jun 2019 13:50  Ca    8.6      31 May 2019 18:23  Phos  5.6     06-03  Phos  5.6     06-02  Phos  4.7     06-01  Mg     2.1     06-03  Mg     2.0     06-02  Mg     2.2     06-01    TPro  7.7  /  Alb  3.5  /  TBili  0.3  /  DBili  x   /  AST  22  /  ALT  28  /  AlkPhos  141<H>  06-01  TPro  7.7  /  Alb  3.3<L>  /  TBili  0.2  /  DBili  x   /  AST  22  /  ALT  25  /  AlkPhos  151<H>  05-31      Thyroid Stimulating Hormone, Serum: 1.12 uU/mL (06-02 @ 06:54)

## 2019-06-03 NOTE — PROGRESS NOTE ADULT - SUBJECTIVE AND OBJECTIVE BOX
PGY1 Note discussed with supervising resident and primary attending.    Patient is a 53y old  Female who presents with a chief complaint of hemodialysis (03 Jun 2019 10:08)      INTERVAL HPI/OVERNIGHT EVENTS: patient assessed bedside, reports no complaints; IR consulted for PC exchange    MEDICATIONS  (STANDING):  benztropine 0.5 milliGRAM(s) Oral two times a day  chlorhexidine 2% Cloths 1 Application(s) Topical daily  fluticasone propionate 50 MICROgram(s)/spray Nasal Spray 1 Spray(s) Both Nostrils two times a day  heparin  Injectable 5000 Unit(s) SubCutaneous every 12 hours  insulin glargine Injectable (LANTUS) 15 Unit(s) SubCutaneous at bedtime  insulin lispro (HumaLOG) corrective regimen sliding scale   SubCutaneous Before meals and at bedtime  levothyroxine 25 MICROGram(s) Oral daily    MEDICATIONS  (PRN):      Allergies    No Known Allergies    Intolerances        REVIEW OF SYSTEMS:  CONSTITUTIONAL: No fever, weight loss, or fatigue  RESPIRATORY: No cough, wheezing, chills or hemoptysis; No shortness of breath  CARDIOVASCULAR: No chest pain, palpitations, dizziness, or leg swelling  GASTROINTESTINAL: No abdominal or epigastric pain. No nausea, vomiting, or hematemesis; No diarrhea or constipation.   NEUROLOGICAL: No headaches, loss of strength, numbness, or tremors  SKIN: No itching, burning, rashes, or lesions     Vital Signs Last 24 Hrs  T(C): 36.4 (03 Jun 2019 08:10), Max: 36.5 (02 Jun 2019 23:52)  T(F): 97.6 (03 Jun 2019 08:10), Max: 97.7 (02 Jun 2019 23:52)  HR: 78 (03 Jun 2019 08:10) (65 - 93)  BP: 129/66 (03 Jun 2019 08:10) (93/55 - 129/66)  BP(mean): --  RR: 16 (03 Jun 2019 08:10) (16 - 16)  SpO2: 100% (03 Jun 2019 08:10) (97% - 100%)    PHYSICAL EXAM:  GENERAL: NAD, well-groomed, well-developed; PC on RCW  CHEST/LUNG: Clear to percussion bilaterally; No rales, rhonchi, wheezing, or rubs  HEART: Regular rate and rhythm; No murmurs, rubs, or gallops  ABDOMEN: Soft, Nontender, Nondistended; Bowel sounds present  NERVOUS SYSTEM:  Alert & Oriented X3, Good concentration; Motor Strength 5/5 B/L   EXTREMITIES:  2+ Peripheral Pulses, No clubbing, cyanosis, or edema      LABS:                        12.6   4.32  )-----------( 187      ( 02 Jun 2019 06:54 )             40.6     06-02    138  |  100  |  46<H>  ----------------------------<  63<L>  3.0<L>   |  27  |  5.97<H>    Ca    8.7      02 Jun 2019 06:54  Phos  5.6     06-02  Mg     2.0     06-02    TPro  7.7  /  Alb  3.5  /  TBili  0.3  /  DBili  x   /  AST  22  /  ALT  28  /  AlkPhos  141<H>  06-01        CAPILLARY BLOOD GLUCOSE      POCT Blood Glucose.: 110 mg/dL (03 Jun 2019 08:34)  POCT Blood Glucose.: 202 mg/dL (02 Jun 2019 21:35)  POCT Blood Glucose.: 192 mg/dL (02 Jun 2019 16:39)  POCT Blood Glucose.: 164 mg/dL (02 Jun 2019 11:44)    Consultant(s) Notes Reviewed:  [ x ] YES  [ ] NO

## 2019-06-03 NOTE — PROGRESS NOTE ADULT - SUBJECTIVE AND OBJECTIVE BOX
PRESENTING CC:    SUBJ: 54 y/o F PMH ESRD (2/2019, unknown etiology, on HD via R-PC at [facility]), DM, HLD, and Bipolar Disorder presents from [facility] for missed hemodialysis -      PMH -reviewed admission note, no change since admission  Heart failure: acute [ ] chronic [ ] acute or chronic [ ] diastolic [ ] systolic [ ] combined systolic and diastolic[ ]  DEISY: ATN[ ] renal medullary necrosis [ ] CKD I [ ]CKDII [ ]CKD III [ ]CKD IV [ ]CKD V [ ]Other pathological lesions [ ]    MEDICATIONS  (STANDING):  aspirin enteric coated 81 milliGRAM(s) Oral daily  benztropine 0.5 milliGRAM(s) Oral two times a day  chlorhexidine 2% Cloths 1 Application(s) Topical daily  fluticasone propionate 50 MICROgram(s)/spray Nasal Spray 1 Spray(s) Both Nostrils two times a day  heparin  Injectable 5000 Unit(s) SubCutaneous every 12 hours  insulin glargine Injectable (LANTUS) 15 Unit(s) SubCutaneous at bedtime  insulin lispro (HumaLOG) corrective regimen sliding scale   SubCutaneous Before meals and at bedtime  levothyroxine 25 MICROGram(s) Oral daily      FAMILY HISTORY:  Family history of diabetes mellitus    No family history of premature coronary artery disease or sudden cardiac death      REVIEW OF SYSTEMS:  Constitutional: [ ] fever, [ ]weight loss,  [x ]fatigue  Eyes: [ ] visual changes  Respiratory: [ ]shortness of breath;  [ ] cough, [ ]wheezing, [ ]chills, [ ]hemoptysis  Cardiovascular: [ ] chest pain, [ ]palpitations, [ ]dizziness,  [ ]leg swelling[ ]orthopnea[ ]PND  Gastrointestinal: [ ] abdominal pain, [ ]nausea, [ ]vomiting,  [ ]diarrhea   Genitourinary: [ ] dysuria, [ ] hematuria  Neurologic: [ ] headaches [ ] tremors[ ]weakness  Skin: [ ] itching, [ ]burning, [ ] rashes  Endocrine: [ ] heat or cold intolerance  Musculoskeletal: [ ] joint pain or swelling; [ ] muscle, back, or extremity pain  Psychiatric: [ ] depression, [ ]anxiety, [ ]mood swings, or [ ]difficulty sleeping  Hematologic: [ ] easy bruising, [ ] bleeding gums    [x] All remaining systems negative except as per above.   [ ]Unable to obtain.    Vital Signs Last 24 Hrs  T(C): 36.4 (03 Jun 2019 08:10), Max: 36.5 (02 Jun 2019 23:52)  T(F): 97.6 (03 Jun 2019 08:10), Max: 97.7 (02 Jun 2019 23:52)  HR: 78 (03 Jun 2019 08:10) (65 - 93)  BP: 129/66 (03 Jun 2019 08:10) (93/55 - 129/66)  RR: 16 (03 Jun 2019 08:10) (16 - 16)  SpO2: 100% (03 Jun 2019 08:10) (97% - 100%)  I&O's Summary      PHYSICAL EXAM:  General: No acute distress BMI-34.3  HEENT: EOMI, PERRL  Neck: Supple, [ ] JVD  Lungs: Equal air entry bilaterally; [ ] rales [ ] wheezing [ ] rhonchi  Heart: Regular rate and rhythm; [x ] murmur   2/6 [x ] systolic [ ] diastolic [ ] radiation[ ] rubs [ ]  gallops  Abdomen: Nontender, bowel sounds present  Extremities: No clubbing, cyanosis, [ ] edema  Nervous system:  Alert & Oriented X3, no focal deficits  Psychiatric: Normal affect  Skin: No rashes or lesions    LABS:  06-02    138  |  100  |  46<H>  ----------------------------<  63<L>  3.0<L>   |  27  |  5.97<H>    Ca    8.7      02 Jun 2019 06:54  Phos  5.6     06-02  Mg     2.0     06-02    TPro  7.7  /  Alb  3.5  /  TBili  0.3  /  DBili  x   /  AST  22  /  ALT  28  /  AlkPhos  141<H>  06-01    Creatinine Trend: 5.97<--, 8.41<--, 7.49<--                        12.6   4.32  )-----------( 187      ( 02 Jun 2019 06:54 )             40.6           IMPRESSION AND PLAN:      54 y/o F PMH ESRD (2/2019, unknown etiology, on HD via R-PC at [facility]), DM, HLD, and Bipolar Disorder presents from [facility] for missed hemodialysis 2/2 malfunctioning PC - now functioning - admitting for HD    Problem/Plan - 1:  ·  Problem: ESRD on dialysis.  Plan: Missed HD Friday 2/2 clotted PC  - Afebrile and CBC/BMP WNLs; no emergent indications for HD  Nephrology consult noted.  Vascular for access replacement.      Problem/Plan - 2:  ·  Problem: Diabetes.  Plan: Resumed Lantus 15 U; C/w diabetic diet w/ ISS  - Monitor FS   Hemoglobin A1C, Whole Blood (06.02.19 @ 13:13)    Hemoglobin A1C, Whole Blood: 7.1 %    Problem/Plan - 3:  ·  Problem: Bipolar disorder.  Plan: On 3 month interval IM antipsychotic  - Mood is currently pleasant.

## 2019-06-03 NOTE — PROGRESS NOTE ADULT - ATTENDING COMMENTS
Patient was seen and examined,interim events noted,labs and radiology studies reviewed.  Marshall Sanchez MD,FACC.  [address] [place] [address].  [id number].  [phone]

## 2019-06-03 NOTE — PROGRESS NOTE ADULT - SUBJECTIVE AND OBJECTIVE BOX
Interventional Radiology Central Line Insertion    Patient and/or family/surrogate educated about central line-associated blood stream infection prevention practices  Central Line insertion checklist utilized    Catheter Type: ( X ) Dialysis  (  ) Introducer  (   ) Central venous catheter   (  ) peripherally inserted central catheter (PICC)      Number of Lumens: (  ) single   ( X ) Double   (   ) Triple  (  ) Antimicrobial catheter    TIME OUT performed prior to this procedure with verbal confirmation of correct patient identity, correct site, side and dallin (if applicable), agreement of the procedure, correct patient position, availability of necessary equipment.  The consent form (if applicable) is complete and accurate. Safety precautions based on patient history or medication use has been addressed.   RN at bedside.    Procedure:  The patient was placed in the ( X )Supine position, (   ) Trendelenburg postiton.  The patient's placement site was prepped with Chlorhexidine solution then draped using maximum sterile barrier protection.  The area was injected with __10___ cc of 1% Lidocaine.  Using the (  ) Seldinger technique  ( X ) Modified Seldinger technique  (  ) Ultrasound, the catheter was introduced.  Strict adherence to outlined aseptic technique, including hand washing prior to donning sterile barriers (gloves and gown), utilizing a mask and cap, plus draping the patient with a sterile drape was observed.  Upon completion of the line placement, the insertion site was covered with sterile dressing.    All materials used for catheter insertion, including the intact guidewire, were accounted for at the end of the procedure.    Emergency placement ( X ) No    (   ) Yes   (   ) New Site    ( X  )  Guide Wire change    Attempted site and actual site ( X ) Right  (   )  Left      Jugular Vein     Post Procedure (Catheter Placement Verification)  The tip of the catheter is confirmed to be in appropriate position by radiography which revealed no pneumothorax. Line may be accessed.

## 2019-06-03 NOTE — PROGRESS NOTE ADULT - ASSESSMENT
A/P:  1. ESRD on HD  2. Malfunctioning P/C with poor BFR  3. Anemia due to CKD. No need for Epogen.  4. MBD: PO4 is acceptable. No need for Rx  HD is scheduled for tomorrow  Vascular surgery/IR for P/C replacement  Keep patient euvolemic and renal diet  Avoid Nephrtoxic Meds/ Agents such as NSAIDs, Gadolinium contrast, Phosphate containing enemas, etc..)  Adjust Medications according to eGFR  Follow H/H A/P:  1. ESRD on HD TTS schedule in the hospital and M/W/F schedule as outpatient  -we will plan for hd tomorrow   2. Malfunctioning P/C with poor BFR  -needs PC Exchange via IR   -pt also needs avf by vascular surgery as outpatient and LUE precautions in place  3. Anemia due to CKD. No need for Epogen.  4. MBD: PO4 is mildly high  -add phoslo as ordered  Keep patient euvolemic and renal diet  Avoid Nephrtoxic Meds/ Agents such as NSAIDs, Gadolinium contrast, Phosphate containing enemas, etc..)  Adjust Medications according to eGFR  Follow H/H

## 2019-06-03 NOTE — PROGRESS NOTE ADULT - ASSESSMENT
54 y/o F PMH ESRD (2/2019, unknown etiology, on HD via R-PC at [facility]), DM, HLD, and Bipolar Disorder presents from [facility] for missed hemodialysis 2/2 malfunctioning PC - now functioning - admitting for HD

## 2019-06-04 DIAGNOSIS — B19.10 UNSPECIFIED VIRAL HEPATITIS B WITHOUT HEPATIC COMA: ICD-10-CM

## 2019-06-04 LAB
GLUCOSE BLDC GLUCOMTR-MCNC: 112 MG/DL — HIGH (ref 70–99)
GLUCOSE BLDC GLUCOMTR-MCNC: 143 MG/DL — HIGH (ref 70–99)
GLUCOSE BLDC GLUCOMTR-MCNC: 146 MG/DL — HIGH (ref 70–99)
GLUCOSE BLDC GLUCOMTR-MCNC: 166 MG/DL — HIGH (ref 70–99)

## 2019-06-04 PROCEDURE — 77001 FLUOROGUIDE FOR VEIN DEVICE: CPT | Mod: 26

## 2019-06-04 PROCEDURE — 36581 REPLACE TUNNELED CV CATH: CPT

## 2019-06-04 RX ORDER — ALTEPLASE 100 MG
4 KIT INTRAVENOUS ONCE
Refills: 0 | Status: COMPLETED | OUTPATIENT
Start: 2019-06-04 | End: 2019-06-04

## 2019-06-04 RX ADMIN — Medication 1 SPRAY(S): at 18:28

## 2019-06-04 RX ADMIN — ALTEPLASE 4 MILLIGRAM(S): KIT at 12:50

## 2019-06-04 RX ADMIN — Medication 25 MICROGRAM(S): at 05:54

## 2019-06-04 RX ADMIN — Medication 667 MILLIGRAM(S): at 12:19

## 2019-06-04 RX ADMIN — Medication 667 MILLIGRAM(S): at 08:11

## 2019-06-04 RX ADMIN — INSULIN GLARGINE 15 UNIT(S): 100 INJECTION, SOLUTION SUBCUTANEOUS at 21:39

## 2019-06-04 RX ADMIN — HEPARIN SODIUM 5000 UNIT(S): 5000 INJECTION INTRAVENOUS; SUBCUTANEOUS at 18:28

## 2019-06-04 RX ADMIN — Medication 667 MILLIGRAM(S): at 18:28

## 2019-06-04 RX ADMIN — Medication 0.5 MILLIGRAM(S): at 18:28

## 2019-06-04 RX ADMIN — Medication 1: at 21:40

## 2019-06-04 RX ADMIN — Medication 0.5 MILLIGRAM(S): at 05:54

## 2019-06-04 RX ADMIN — Medication 1 SPRAY(S): at 05:53

## 2019-06-04 NOTE — PROGRESS NOTE ADULT - ASSESSMENT
54 y/o F PMH ESRD (2/2019, unknown etiology, on HD via R-PC at [facility]), DM, HLD, and Bipolar Disorder presents from [facility] for missed hemodialysis 2/2 malfunctioning PC - now functioning - admitting for HD

## 2019-06-04 NOTE — CONSULT NOTE ADULT - SUBJECTIVE AND OBJECTIVE BOX
Patient is a 53y old  Female who presents with a chief complaint of hemodialysis (03 Jun 2019 15:18)    HPI:   54 y/o F PMH ESRD (2/2019, unknown etiology, on HD via R-PC at [facility]), DM, HLD, and Bipolar Disorder presents from [facility] for missed hemodialysis 2/2 malfunctioning PC - now functioning - admitted for HD. Had negative hepatitis panel last year, now noted to have positive Hep B surface antigen. Patient denies any blood transfusion, drug use and is not sexually active. Denies any abdominal pain, nausea, vomiting, fatigue or any other complaints.     REVIEW OF SYSTEMS  Constitutional:  No fever, no fatigue, no pallor, no night sweats, no weight loss.  HEENT:  No eye pain, no vision changes, no icterus, no mouth ulcers.  Respiratory:   No shortness of breath, no cough, no respiratory distress.   Cardiovascular:   No chest pain, no palpitations.   Gastrointestinal: No abdominal pain, no nausea, no vomiting , no diarrhea no constipation, no hematochezia, no melena.  Skin:   No rashes, no jaundice, no eczema.   Musculoskeletal:   No joint pain, no swelling, no myalgia.   Neurologic:   No headache, no seizure, no weakness.   Genitourinary:   No dysuria, no decreased urine output.  Psychiatric:  No depression, no anxiety,   Heme/Lymphatic:   No anemia, no blood transfusions, no lymph node enlargement, no bleeding, no bruising.  ___________________________________________________________________________________________  Allergies  No Known Allergies      MEDICATIONS  (STANDING):  benztropine 0.5 milliGRAM(s) Oral two times a day  calcium acetate 667 milliGRAM(s) Oral three times a day with meals  chlorhexidine 2% Cloths 1 Application(s) Topical daily  chlorhexidine 2% Cloths 1 Application(s) Topical daily  fluticasone propionate 50 MICROgram(s)/spray Nasal Spray 1 Spray(s) Both Nostrils two times a day  heparin  Injectable 5000 Unit(s) SubCutaneous every 12 hours  insulin glargine Injectable (LANTUS) 15 Unit(s) SubCutaneous at bedtime  insulin lispro (HumaLOG) corrective regimen sliding scale   SubCutaneous Before meals and at bedtime  levothyroxine 25 MICROGram(s) Oral daily      PAST MEDICAL & SURGICAL HISTORY:  ESRD on dialysis  CAD (coronary artery disease)  Bipolar disorder  Schizophrenia  Diabetes  No significant past surgical history      FAMILY HISTORY:  Family history of diabetes mellitus      Social History:   Denies smoking, alcohol, or drug abuse  ______________________________________________________________________________________    PHYSICAL EXAM    Daily     Daily   BMI: 34.3 (06-03 @ 08:59)  Change in Weight:  Vital Signs Last 24 Hrs  T(C): 36.6 (04 Jun 2019 07:46), Max: 36.6 (03 Jun 2019 15:58)  T(F): 97.8 (04 Jun 2019 07:46), Max: 97.9 (03 Jun 2019 15:58)  HR: 47 (04 Jun 2019 07:46) (47 - 63)  BP: 106/48 (04 Jun 2019 07:46) (100/63 - 106/48)  BP(mean): --  RR: 17 (04 Jun 2019 07:46) (16 - 17)  SpO2: 100% (04 Jun 2019 07:46) (100% - 100%)    General: Well developed, well nourished, alert and active, no pallor, NAD.  HEENT: Normal appearance of conjunctiva, ears, nose, lips, oropharynx, and oral mucosa, anicteric.  Neck: supple  Cardiovascular:  RRR normal S1/S2, no murmur.  Respiratory:  CTA B/L, normal respiratory effort.   Abdominal:   soft, no masses or tenderness, normoactive BS, NT/ND, no HSM.  Extremities:   No clubbing or cyanosis, normal capillary refill, no edema.   Skin:   No rash, jaundice, lesions, eczema.   Musculoskeletal:  No joint swelling, erythema or tenderness.   Neuro: No focal deficits.   Other:   _______________________________________________________________________________________________  Lab Results:                          12.2   3.64  )-----------( 194      ( 03 Jun 2019 12:38 )             38.9     06-03    137  |  100  |  60<H>  ----------------------------<  174<H>  3.5   |  26  |  7.10<H>    Ca    8.9      03 Jun 2019 12:38    Phos  5.6     06-03  Mg     2.1     06-03    PT/INR - ( 03 Jun 2019 12:38 )   PT: 10.6 sec;   INR: 0.96 ratio       PTT - ( 03 Jun 2019 12:38 )  PTT:34.5 sec    Acute Hepatitis Panel (06.01.19 @ 23:07)    Hepatitis C Virus Interpretation: Nonreact: Hepatitis C AB    Hepatitis C Virus S/CO Ratio: 0.10 S/CO    Hepatitis B Core IgM Antibody: Nonreact    Hepatitis B Surface Antigen: Reactive: Test Repeated, Confirmed Positive.    Hepatitis A IgM Antibody: Nonreact

## 2019-06-04 NOTE — CONSULT NOTE ADULT - ASSESSMENT
54 y/o F PMH ESRD, DM, HLD, and Bipolar Disorder presents from [facility] for missed hemodialysis 2/2 malfunctioning PC - now functioning - admitted for HD. Had negative hepatitis panel last year, now noted to have positive Hep B surface antigen. 54 y/o F PMH ESRD, DM, HLD, and Bipolar Disorder presents from [facility] for missed hemodialysis 2/2 malfunctioning PC - now functioning - admitted for HD. Had negative hepatitis panel last year, now noted to have positive Hep B surface antigen.    1. Hepatis B surface antigen positive:   Patient denies any high risk behaviors for hep B  Would recommend to repeat the test  Check HBsAg, anti-HBs, anti-HBc, IgM anti-HBc, HBeAg, anti-HBe and Hep B PCR  Further recommendations pending these tests.

## 2019-06-04 NOTE — PROGRESS NOTE ADULT - ASSESSMENT
A/P:  1. ESRD on HD TTS schedule in the hospital and M/W/F schedule as outpatient  -we will plan for hd tomorrow   2. Malfunctioning P/C with poor BFR  -needs PC Exchange via IR   -pt also needs avf by vascular surgery as outpatient and LUE precautions in place  3. Anemia due to CKD. No need for Epogen.  4. MBD: PO4 is mildly high  -add phoslo as ordered  Keep patient euvolemic and renal diet  Avoid Nephrtoxic Meds/ Agents such as NSAIDs, Gadolinium contrast, Phosphate containing enemas, etc..)  Adjust Medications according to eGFR  Follow H/H A/P:  1. ESRD on HD TTS schedule in the hospital and M/W/F schedule as outpatient  -pt with non functioning PC (cathflow infusion done but pc still not working ) unable to dialyze pt today   -pt will need new PC by IR tomorrow  -pt also needs avf by vascular surgery as outpatient and LUE precautions in place  -f/u bmp in am   2.. ANEMIA: pt currently has  acceptble Hb - No need for Epogen.  -f/u Hb daily  3. MBD: PO4 is mildly high  -continue  phoslo as ordered  Keep patient euvolemic and HD renal diet  Avoid Nephrtoxic Meds/ Agents such as NSAIDs, Gadolinium contrast, Phosphate containing enemas, etc..)  Adjust Medications according to eGFR

## 2019-06-04 NOTE — PROGRESS NOTE ADULT - SUBJECTIVE AND OBJECTIVE BOX
pt seen and examined.pts current chart reviewed and case discussed with resident covering.    SUBJECTIVE:  pt feels fine and denies cp,sob,gi or gu/uremic  symptons    REVIEW OF SYSTEMS:  CONSTITUTIONAL: No weakness, fevers or chills  EYES/ENT: No visual changes;  No vertigo or throat pain   NECK: No pain or stiffness  RESPIRATORY: No cough, wheezing, hemoptysis; No shortness of breath  CARDIOVASCULAR: No chest pain or palpitations  GASTROINTESTINAL: No abdominal or epigastric pain. No nausea, vomiting, or hematemesis; No diarrhea or constipation. No melena or hematochezia.  GENITOURINARY: No dysuria, frequency , hematuria, flank pain or nocturia  NEUROLOGICAL: No numbness or weakness  SKIN: No itching, burning, rashes, or lesions   All other review of systems is negative unless indicated above    Current meds:    benztropine 0.5 milliGRAM(s) Oral two times a day  calcium acetate 667 milliGRAM(s) Oral three times a day with meals  chlorhexidine 2% Cloths 1 Application(s) Topical daily  chlorhexidine 2% Cloths 1 Application(s) Topical daily  fluticasone propionate 50 MICROgram(s)/spray Nasal Spray 1 Spray(s) Both Nostrils two times a day  heparin  Injectable 5000 Unit(s) SubCutaneous every 12 hours  insulin glargine Injectable (LANTUS) 15 Unit(s) SubCutaneous at bedtime  insulin lispro (HumaLOG) corrective regimen sliding scale   SubCutaneous Before meals and at bedtime  levothyroxine 25 MICROGram(s) Oral daily      Vital Signs    T(F): 97.8 (06-04-19 @ 07:46), Max: 97.9 (06-03-19 @ 15:58)  HR: 47 (06-04-19 @ 07:46) (47 - 63)  BP: 106/48 (06-04-19 @ 07:46) (100/63 - 106/48)  ABP: --  RR: 17 (06-04-19 @ 07:46) (16 - 17)  SpO2: 100% (06-04-19 @ 07:46) (100% - 100%)  Wt(kg): --  CVP(cm H2O): --  CO: --  PCWP: --    I and O's:    Daily     Daily     PHYSICAL EXAM:  GENERAL: NAD, well-nourished, well-developed  HEAD:  Atraumatic, Normocephalic  EYES: Sclera anicteric  ENMT: Moist mucous membranes  NECK: Supple, No JVD  Rt chest PC noted  NERVOUS SYSTEM:  Alert & Oriented X3  CHEST/LUNG: Clear to auscultation  HEART: Regular rate and rhythm; No murmurs  ABDOMEN: Soft, Nontender, Nondistended; Bowel sounds present  EXTREMITIES:  No edema  SKIN: Normal turgor      LABS:    CBC:                          12.2   3.64  )-----------( 194      ( 03 Jun 2019 12:38 )             38.9           BMP:    06-03    137  |  100  |  60<H>  ----------------------------<  174<H>  3.5   |  26  |  7.10<H>  06-02    138  |  100  |  46<H>  ----------------------------<  63<L>  3.0<L>   |  27  |  5.97<H>    Ca    8.9      03 Jun 2019 12:38  Ca    8.7      02 Jun 2019 06:54  Phos  5.6     06-03  Phos  5.6     06-02  Mg     2.1     06-03  Mg     2.0     06-02        Thyroid Stimulating Hormone, Serum: 1.12 uU/mL (06-02 @ 06:54)      URINE STUDIES:                        RADIOLOGY & ADDITIONAL STUDIES: pt seen and examined.    SUBJECTIVE:  pt seen in dialysis unit  pt  denies cp,sob,gi or uremic  symptoms  pts PC is not working well despite cathflow infusion by dialysis  RN  pt will need new PC by IR tomorrow    Current meds:    benztropine 0.5 milliGRAM(s) Oral two times a day  calcium acetate 667 milliGRAM(s) Oral three times a day with meals  chlorhexidine 2% Cloths 1 Application(s) Topical daily  chlorhexidine 2% Cloths 1 Application(s) Topical daily  fluticasone propionate 50 MICROgram(s)/spray Nasal Spray 1 Spray(s) Both Nostrils two times a day  heparin  Injectable 5000 Unit(s) SubCutaneous every 12 hours  insulin glargine Injectable (LANTUS) 15 Unit(s) SubCutaneous at bedtime  insulin lispro (HumaLOG) corrective regimen sliding scale   SubCutaneous Before meals and at bedtime  levothyroxine 25 MICROGram(s) Oral daily      Vital Signs    T(F): 97.8 (06-04-19 @ 07:46), Max: 97.9 (06-03-19 @ 15:58)  HR: 47 (06-04-19 @ 07:46) (47 - 63)  BP: 106/48 (06-04-19 @ 07:46) (100/63 - 106/48)  ABP: --  RR: 17 (06-04-19 @ 07:46) (16 - 17)  SpO2: 100% (06-04-19 @ 07:46) (100% - 100%)  Wt(kg): --  CVP(cm H2O): --  CO: --  PCWP: --    I and O's:    Daily     Daily     PHYSICAL EXAM:  GENERAL: NAD, well-nourished, well-developed  HEAD:  Atraumatic, Normocephalic  EYES: Sclera anicteric  ENMT: Moist mucous membranes  NECK: Supple, No JVD  Rt chest PC noted  NERVOUS SYSTEM:  Alert & Oriented X3  CHEST/LUNG: Clear to auscultation  HEART: Regular rate and rhythm; No murmurs  ABDOMEN: Soft, Nontender, Nondistended; Bowel sounds present  EXTREMITIES:  No edema  SKIN: Normal turgor      LABS:    CBC:                      12.2   3.64  )-----------( 194      ( 03 Jun 2019 12:38 )             38.9       BMP:    06-03    137  |  100  |  60<H>  ----------------------------<  174<H>  3.5   |  26  |  7.10<H>  06-02    138  |  100  |  46<H>  ----------------------------<  63<L>  3.0<L>   |  27  |  5.97<H>    Ca    8.9      03 Jun 2019 12:38  Ca    8.7      02 Jun 2019 06:54  Phos  5.6     06-03  Phos  5.6     06-02  Mg     2.1     06-03  Mg     2.0     06-02        Thyroid Stimulating Hormone, Serum: 1.12 uU/mL (06-02 @ 06:54)

## 2019-06-04 NOTE — PROGRESS NOTE ADULT - SUBJECTIVE AND OBJECTIVE BOX
PGY1 Note discussed with supervising resident and primary attending.    Patient is a 53y old  Female who presents with a chief complaint of hemodialysis (03 Jun 2019 15:18)      INTERVAL HPI/OVERNIGHT EVENTS: patient's PC exchange sby IR yesterday, to go for HD today. Noted to have HepB surface Ag +. Offers no complaints    MEDICATIONS  (STANDING):  benztropine 0.5 milliGRAM(s) Oral two times a day  calcium acetate 667 milliGRAM(s) Oral three times a day with meals  chlorhexidine 2% Cloths 1 Application(s) Topical daily  chlorhexidine 2% Cloths 1 Application(s) Topical daily  fluticasone propionate 50 MICROgram(s)/spray Nasal Spray 1 Spray(s) Both Nostrils two times a day  heparin  Injectable 5000 Unit(s) SubCutaneous every 12 hours  insulin glargine Injectable (LANTUS) 15 Unit(s) SubCutaneous at bedtime  insulin lispro (HumaLOG) corrective regimen sliding scale   SubCutaneous Before meals and at bedtime  levothyroxine 25 MICROGram(s) Oral daily    MEDICATIONS  (PRN):      Allergies    No Known Allergies    Intolerances        REVIEW OF SYSTEMS:  CONSTITUTIONAL: No fever, weight loss, or fatigue  RESPIRATORY: No cough, wheezing, chills or hemoptysis; No shortness of breath  CARDIOVASCULAR: No chest pain, palpitations, dizziness, or leg swelling  GASTROINTESTINAL: No abdominal or epigastric pain. No nausea, vomiting, or hematemesis; No diarrhea or constipation.   NEUROLOGICAL: No headaches, loss of strength, numbness, or tremors  SKIN: No itching, burning, rashes, or lesions     Vital Signs Last 24 Hrs  T(C): 36.6 (04 Jun 2019 07:46), Max: 36.6 (03 Jun 2019 15:58)  T(F): 97.8 (04 Jun 2019 07:46), Max: 97.9 (03 Jun 2019 15:58)  HR: 47 (04 Jun 2019 07:46) (47 - 63)  BP: 106/48 (04 Jun 2019 07:46) (100/63 - 106/48)  BP(mean): --  RR: 17 (04 Jun 2019 07:46) (16 - 17)  SpO2: 100% (04 Jun 2019 07:46) (100% - 100%)      PHYSICAL EXAM:  GENERAL: NAD, well-groomed, well-developed; PC on RCW  CHEST/LUNG: Clear to percussion bilaterally; No rales, rhonchi, wheezing, or rubs  HEART: Regular rate and rhythm; No murmurs, rubs, or gallops  ABDOMEN: Soft, Nontender, Nondistended; Bowel sounds present  NERVOUS SYSTEM:  Alert & Oriented X3, Good concentration; Motor Strength 5/5 B/L   EXTREMITIES:  2+ Peripheral Pulses, No clubbing, cyanosis, or edema    LABS:                        12.2   3.64  )-----------( 194      ( 03 Jun 2019 12:38 )             38.9     06-03    137  |  100  |  60<H>  ----------------------------<  174<H>  3.5   |  26  |  7.10<H>    Ca    8.9      03 Jun 2019 12:38  Phos  5.6     06-03  Mg     2.1     06-03      PT/INR - ( 03 Jun 2019 12:38 )   PT: 10.6 sec;   INR: 0.96 ratio         PTT - ( 03 Jun 2019 12:38 )  PTT:34.5 sec    CAPILLARY BLOOD GLUCOSE      POCT Blood Glucose.: 112 mg/dL (04 Jun 2019 08:02)  POCT Blood Glucose.: 117 mg/dL (03 Jun 2019 21:15)  POCT Blood Glucose.: 126 mg/dL (03 Jun 2019 16:40)  POCT Blood Glucose.: 190 mg/dL (03 Jun 2019 11:50)      Consultant(s) Notes Reviewed:  [ x ] YES  [ ] NO

## 2019-06-05 LAB
ANION GAP SERPL CALC-SCNC: 8 MMOL/L — SIGNIFICANT CHANGE UP (ref 5–17)
BUN SERPL-MCNC: 86 MG/DL — HIGH (ref 7–18)
CALCIUM SERPL-MCNC: 8.3 MG/DL — LOW (ref 8.4–10.5)
CHLORIDE SERPL-SCNC: 105 MMOL/L — SIGNIFICANT CHANGE UP (ref 96–108)
CO2 SERPL-SCNC: 26 MMOL/L — SIGNIFICANT CHANGE UP (ref 22–31)
CREAT SERPL-MCNC: 8.14 MG/DL — HIGH (ref 0.5–1.3)
GLUCOSE BLDC GLUCOMTR-MCNC: 106 MG/DL — HIGH (ref 70–99)
GLUCOSE BLDC GLUCOMTR-MCNC: 163 MG/DL — HIGH (ref 70–99)
GLUCOSE BLDC GLUCOMTR-MCNC: 166 MG/DL — HIGH (ref 70–99)
GLUCOSE BLDC GLUCOMTR-MCNC: 93 MG/DL — SIGNIFICANT CHANGE UP (ref 70–99)
GLUCOSE SERPL-MCNC: 154 MG/DL — HIGH (ref 70–99)
HBV CORE AB SER-ACNC: SIGNIFICANT CHANGE UP
HBV DNA # SERPL NAA+PROBE: SIGNIFICANT CHANGE UP IU/ML
HBV DNA SERPL NAA+PROBE-LOG#: SIGNIFICANT CHANGE UP LOGIU/ML
HBV SURFACE AB SER-ACNC: <3 MIU/ML — LOW
HBV SURFACE AB SER-ACNC: SIGNIFICANT CHANGE UP
HBV SURFACE AG SER-ACNC: SIGNIFICANT CHANGE UP
HCT VFR BLD CALC: 33.5 % — LOW (ref 34.5–45)
HGB BLD-MCNC: 10.3 G/DL — LOW (ref 11.5–15.5)
MAGNESIUM SERPL-MCNC: 1.9 MG/DL — SIGNIFICANT CHANGE UP (ref 1.6–2.6)
MCHC RBC-ENTMCNC: 26.3 PG — LOW (ref 27–34)
MCHC RBC-ENTMCNC: 30.7 GM/DL — LOW (ref 32–36)
MCV RBC AUTO: 85.5 FL — SIGNIFICANT CHANGE UP (ref 80–100)
NRBC # BLD: 0 /100 WBCS — SIGNIFICANT CHANGE UP (ref 0–0)
PHOSPHATE SERPL-MCNC: 4.5 MG/DL — SIGNIFICANT CHANGE UP (ref 2.5–4.5)
PLATELET # BLD AUTO: 178 K/UL — SIGNIFICANT CHANGE UP (ref 150–400)
POTASSIUM SERPL-MCNC: 3.8 MMOL/L — SIGNIFICANT CHANGE UP (ref 3.5–5.3)
POTASSIUM SERPL-SCNC: 3.8 MMOL/L — SIGNIFICANT CHANGE UP (ref 3.5–5.3)
RBC # BLD: 3.92 M/UL — SIGNIFICANT CHANGE UP (ref 3.8–5.2)
RBC # FLD: 13.6 % — SIGNIFICANT CHANGE UP (ref 10.3–14.5)
SODIUM SERPL-SCNC: 139 MMOL/L — SIGNIFICANT CHANGE UP (ref 135–145)
WBC # BLD: 4.76 K/UL — SIGNIFICANT CHANGE UP (ref 3.8–10.5)
WBC # FLD AUTO: 4.76 K/UL — SIGNIFICANT CHANGE UP (ref 3.8–10.5)

## 2019-06-05 RX ADMIN — Medication 0.5 MILLIGRAM(S): at 17:15

## 2019-06-05 RX ADMIN — INSULIN GLARGINE 15 UNIT(S): 100 INJECTION, SOLUTION SUBCUTANEOUS at 22:40

## 2019-06-05 RX ADMIN — Medication 25 MICROGRAM(S): at 05:42

## 2019-06-05 RX ADMIN — Medication 1: at 17:14

## 2019-06-05 RX ADMIN — Medication 1 SPRAY(S): at 05:42

## 2019-06-05 RX ADMIN — Medication 1 SPRAY(S): at 17:15

## 2019-06-05 RX ADMIN — Medication 0.5 MILLIGRAM(S): at 05:42

## 2019-06-05 RX ADMIN — HEPARIN SODIUM 5000 UNIT(S): 5000 INJECTION INTRAVENOUS; SUBCUTANEOUS at 17:15

## 2019-06-05 RX ADMIN — CHLORHEXIDINE GLUCONATE 1 APPLICATION(S): 213 SOLUTION TOPICAL at 11:09

## 2019-06-05 RX ADMIN — Medication 667 MILLIGRAM(S): at 09:16

## 2019-06-05 RX ADMIN — Medication 667 MILLIGRAM(S): at 13:41

## 2019-06-05 RX ADMIN — HEPARIN SODIUM 5000 UNIT(S): 5000 INJECTION INTRAVENOUS; SUBCUTANEOUS at 05:42

## 2019-06-05 RX ADMIN — Medication 1: at 13:41

## 2019-06-05 RX ADMIN — Medication 667 MILLIGRAM(S): at 17:15

## 2019-06-05 NOTE — PROGRESS NOTE ADULT - ASSESSMENT
54 y/o F PMH ESRD (2/2019, unknown etiology, on HD via R-PC at [facility]), DM, HLD, and Bipolar Disorder presents from [facility] for missed hemodialysis 2/2 malfunctioning PC - now functioning - admitting for HD

## 2019-06-05 NOTE — PROGRESS NOTE ADULT - SUBJECTIVE AND OBJECTIVE BOX
pt seen and examined.pts current chart reviewed and case discussed with resident covering.    SUBJECTIVE:  pt feels fine and denies cp,sob,gi or gu/uremic  symptons    REVIEW OF SYSTEMS:  CONSTITUTIONAL: No weakness, fevers or chills  EYES/ENT: No visual changes;  No vertigo or throat pain   NECK: No pain or stiffness  RESPIRATORY: No cough, wheezing, hemoptysis; No shortness of breath  CARDIOVASCULAR: No chest pain or palpitations  GASTROINTESTINAL: No abdominal or epigastric pain. No nausea, vomiting, or hematemesis; No diarrhea or constipation. No melena or hematochezia.  GENITOURINARY: No dysuria, frequency , hematuria, flank pain or nocturia  NEUROLOGICAL: No numbness or weakness  SKIN: No itching, burning, rashes, or lesions   All other review of systems is negative unless indicated above    Current meds:    benztropine 0.5 milliGRAM(s) Oral two times a day  calcium acetate 667 milliGRAM(s) Oral three times a day with meals  chlorhexidine 2% Cloths 1 Application(s) Topical daily  chlorhexidine 2% Cloths 1 Application(s) Topical daily  fluticasone propionate 50 MICROgram(s)/spray Nasal Spray 1 Spray(s) Both Nostrils two times a day  heparin  Injectable 5000 Unit(s) SubCutaneous every 12 hours  insulin glargine Injectable (LANTUS) 15 Unit(s) SubCutaneous at bedtime  insulin lispro (HumaLOG) corrective regimen sliding scale   SubCutaneous Before meals and at bedtime  levothyroxine 25 MICROGram(s) Oral daily      Vital Signs    T(F): 97.8 (06-05-19 @ 07:58), Max: 98.1 (06-04-19 @ 16:09)  HR: 65 (06-05-19 @ 07:58) (56 - 606)  BP: 102/53 (06-05-19 @ 07:58) (102/53 - 118/53)  ABP: --  RR: 16 (06-05-19 @ 07:58) (16 - 17)  SpO2: 100% (06-05-19 @ 07:58) (98% - 100%)  Wt(kg): --  CVP(cm H2O): --  CO: --  PCWP: --    I and O's:    Daily     Daily     PHYSICAL EXAM:  Constitutional: well developed, well nourished  and in nad  HEENT: PERRLA,  no icteric sclera and mild pallor of conjunctiva noted  Neck: No JVD, thyromegaly or adenopathy  Respiratory: reduced air entry lower lungs with no rales, wheezing or rhonchi  Cardiovascular: S1 and S2 normally heard  Gastrointestinal: soft, nondistended, nontender and normal bowel sounds heard  Extremities: No peripheral edema or cyanosis  Neurological: A/O x 3, no focal deficits  : No flank or cva tenderness palpated.  Skin: No rashes      LABS:    CBC:            BMP:    06-03    137  |  100  |  60<H>  ----------------------------<  174<H>  3.5   |  26  |  7.10<H>    Ca    8.9      03 Jun 2019 12:38  Phos  5.6     06-03  Mg     2.1     06-03            URINE STUDIES:                        RADIOLOGY & ADDITIONAL STUDIES: pt seen and examined.    SUBJECTIVE:  pt feels fine and denies cp,sob,gi or uremic  symptoms  pt with s/p PC check by IR and PC is ok as per IR but needs heparin lock   we will use Heparin during dialysis     Current meds:    benztropine 0.5 milliGRAM(s) Oral two times a day  calcium acetate 667 milliGRAM(s) Oral three times a day with meals  chlorhexidine 2% Cloths 1 Application(s) Topical daily  chlorhexidine 2% Cloths 1 Application(s) Topical daily  fluticasone propionate 50 MICROgram(s)/spray Nasal Spray 1 Spray(s) Both Nostrils two times a day  heparin  Injectable 5000 Unit(s) SubCutaneous every 12 hours  insulin glargine Injectable (LANTUS) 15 Unit(s) SubCutaneous at bedtime  insulin lispro (HumaLOG) corrective regimen sliding scale   SubCutaneous Before meals and at bedtime  levothyroxine 25 MICROGram(s) Oral daily      Vital Signs    T(F): 97.8 (06-05-19 @ 07:58), Max: 98.1 (06-04-19 @ 16:09)  HR: 65 (06-05-19 @ 07:58) (56 - 606)  BP: 102/53 (06-05-19 @ 07:58) (102/53 - 118/53)  ABP: --  RR: 16 (06-05-19 @ 07:58) (16 - 17)  SpO2: 100% (06-05-19 @ 07:58) (98% - 100%)  Wt(kg): --  CVP(cm H2O): --  CO: --  PCWP: --    I and O's:    Daily     Daily     PHYSICAL EXAM:  Constitutional: well developed, well nourished  and in nad  HEENT: PERRLA,  no icteric sclera and mild pallor of conjunctiva noted  Neck: No JVD  PC noted  Respiratory: reduced air entry lower lungs with no rales, wheezing or rhonchi  Cardiovascular: S1 and S2 normally heard  Gastrointestinal: soft, nondistended, nontender and normal bowel sounds heard  Extremities: No peripheral edema or cyanosis  Neurological: A/O x 3, no focal deficits  : No flank or cva tenderness palpated.  Skin: No rashes      LABS: peding in dialysis    CBC:            BMP:    06-03    137  |  100  |  60<H>  ----------------------------<  174<H>  3.5   |  26  |  7.10<H>    Ca    8.9      03 Jun 2019 12:38  Phos  5.6     06-03  Mg     2.1     06-03    Hepatitis B Surface Antigen (06.05.19 @ 04:04)    Hepatitis B Surface Antigen: Nonreact    Hepatitis B Surface Antibody: Nonreact (06.05.19 @ 04:04)

## 2019-06-05 NOTE — PROGRESS NOTE ADULT - ASSESSMENT
A/P:  1. ESRD on HD TTS schedule in the hospital and M/W/F schedule as outpatient  -pt with non functioning PC (cathflow infusion done but pc still not working ) unable to dialyze pt today   -pt will need new PC by IR tomorrow  -pt also needs avf by vascular surgery as outpatient and LUE precautions in place  -f/u bmp in am   2.. ANEMIA: pt currently has  acceptble Hb - No need for Epogen.  -f/u Hb daily  3. MBD: PO4 is mildly high  -continue  phoslo as ordered  Keep patient euvolemic and HD renal diet  Avoid Nephrtoxic Meds/ Agents such as NSAIDs, Gadolinium contrast, Phosphate containing enemas, etc..)  Adjust Medications according to eGFR A/P:  1. ESRD on HD TTS schedule in the hospital and M/W/F schedule as outpatient  -pt with s/p PC check by IR  -pt will be dialyzed this pm with heparin to avoid clot in the catheter  -pt also needs avf by vascular surgery as outpatient and LUE precautions in place  -f/u bmp later today  2.. ANEMIA: pt currently has  acceptble Hb - No need for Epogen.  -f/u Hb daily  3. MBD: PO4 is mildly high  -continue  phoslo as ordered  Keep patient euvolemic and HD renal diet  Avoid Nephrtoxic Meds/ Agents such as NSAIDs, Gadolinium contrast, Phosphate containing enemas, etc..)  Adjust Medications according to eGFR

## 2019-06-05 NOTE — PROGRESS NOTE ADULT - SUBJECTIVE AND OBJECTIVE BOX
Patient presented to interventional radiology for evaluation of malfunctioning catheter and possible exchange. The catheter was evaluated and, after initial aspiration of tiny clot, the catheter freely aspirated and flushed without difficulty. Fluoroscopy demonstrated catheter in good position. Given good position and function, the catheter was not exchanged. Catheter was locked with heparin and patient was sent back to room in stable condition. As per dialysis nurse, catheters usually not locked with heparin.  -Recommend locking catheter with heparin solution after dialysis.  -Discussed with Dr. Lemus and with dialysis nurse.

## 2019-06-05 NOTE — PROGRESS NOTE ADULT - SUBJECTIVE AND OBJECTIVE BOX
PGY1 Note discussed with supervising resident and primary attending.    Patient is a 53y old  Female who presents with a chief complaint of hemodialysis (05 Jun 2019 13:35)      INTERVAL HPI/OVERNIGHT EVENTS: patient went for IR guided catheter exchange today, however clot aspirated and cath working well as per IR. Assessed bedside, offers no complaints    MEDICATIONS  (STANDING):  benztropine 0.5 milliGRAM(s) Oral two times a day  calcium acetate 667 milliGRAM(s) Oral three times a day with meals  chlorhexidine 2% Cloths 1 Application(s) Topical daily  chlorhexidine 2% Cloths 1 Application(s) Topical daily  fluticasone propionate 50 MICROgram(s)/spray Nasal Spray 1 Spray(s) Both Nostrils two times a day  heparin  Injectable 5000 Unit(s) SubCutaneous every 12 hours  insulin glargine Injectable (LANTUS) 15 Unit(s) SubCutaneous at bedtime  insulin lispro (HumaLOG) corrective regimen sliding scale   SubCutaneous Before meals and at bedtime  levothyroxine 25 MICROGram(s) Oral daily    MEDICATIONS  (PRN):      Allergies    No Known Allergies    Intolerances        REVIEW OF SYSTEMS:  CONSTITUTIONAL: No fever, weight loss, or fatigue  RESPIRATORY: No cough, wheezing, chills or hemoptysis; No shortness of breath  CARDIOVASCULAR: No chest pain, palpitations, dizziness, or leg swelling  GASTROINTESTINAL: No abdominal or epigastric pain. No nausea, vomiting, or hematemesis; No diarrhea or constipation.   NEUROLOGICAL: No headaches, loss of strength, numbness, or tremors  SKIN: No itching, burning, rashes, or lesions     Vital Signs Last 24 Hrs  T(C): 36.6 (05 Jun 2019 07:58), Max: 36.7 (04 Jun 2019 16:09)  T(F): 97.8 (05 Jun 2019 07:58), Max: 98.1 (04 Jun 2019 16:09)  HR: 65 (05 Jun 2019 07:58) (56 - 606)  BP: 102/53 (05 Jun 2019 07:58) (102/53 - 118/53)  BP(mean): --  RR: 16 (05 Jun 2019 07:58) (16 - 17)  SpO2: 100% (05 Jun 2019 07:58) (98% - 100%)    PHYSICAL EXAM:  GENERAL: NAD, well-groomed, well-developed; PC on RCW  CHEST/LUNG: Clear to percussion bilaterally; No rales, rhonchi, wheezing, or rubs  HEART: Regular rate and rhythm; No murmurs, rubs, or gallops  ABDOMEN: Soft, Nontender, Nondistended; Bowel sounds present  NERVOUS SYSTEM:  Alert & Oriented X3, Good concentration; Motor Strength 5/5 B/L   EXTREMITIES:  2+ Peripheral Pulses, No clubbing, cyanosis, or edema      CAPILLARY BLOOD GLUCOSE      POCT Blood Glucose.: 166 mg/dL (05 Jun 2019 12:54)  POCT Blood Glucose.: 93 mg/dL (05 Jun 2019 08:42)  POCT Blood Glucose.: 166 mg/dL (04 Jun 2019 21:36)  POCT Blood Glucose.: 146 mg/dL (04 Jun 2019 16:58)      Consultant(s) Notes Reviewed:  [ x ] YES  [ ] NO

## 2019-06-06 ENCOUNTER — TRANSCRIPTION ENCOUNTER (OUTPATIENT)
Age: 54
End: 2019-06-06

## 2019-06-06 LAB
ANION GAP SERPL CALC-SCNC: 10 MMOL/L — SIGNIFICANT CHANGE UP (ref 5–17)
BUN SERPL-MCNC: 44 MG/DL — HIGH (ref 7–18)
CALCIUM SERPL-MCNC: 8.5 MG/DL — SIGNIFICANT CHANGE UP (ref 8.4–10.5)
CHLORIDE SERPL-SCNC: 105 MMOL/L — SIGNIFICANT CHANGE UP (ref 96–108)
CO2 SERPL-SCNC: 27 MMOL/L — SIGNIFICANT CHANGE UP (ref 22–31)
CREAT SERPL-MCNC: 5.45 MG/DL — HIGH (ref 0.5–1.3)
GLUCOSE BLDC GLUCOMTR-MCNC: 109 MG/DL — HIGH (ref 70–99)
GLUCOSE BLDC GLUCOMTR-MCNC: 133 MG/DL — HIGH (ref 70–99)
GLUCOSE BLDC GLUCOMTR-MCNC: 148 MG/DL — HIGH (ref 70–99)
GLUCOSE BLDC GLUCOMTR-MCNC: 80 MG/DL — SIGNIFICANT CHANGE UP (ref 70–99)
GLUCOSE SERPL-MCNC: 67 MG/DL — LOW (ref 70–99)
HBV E AB SER-ACNC: NEGATIVE — SIGNIFICANT CHANGE UP
HBV E AG SER-ACNC: NEGATIVE — SIGNIFICANT CHANGE UP
HCT VFR BLD CALC: 34.2 % — LOW (ref 34.5–45)
HGB BLD-MCNC: 10.6 G/DL — LOW (ref 11.5–15.5)
MAGNESIUM SERPL-MCNC: 2 MG/DL — SIGNIFICANT CHANGE UP (ref 1.6–2.6)
MCHC RBC-ENTMCNC: 26.2 PG — LOW (ref 27–34)
MCHC RBC-ENTMCNC: 31 GM/DL — LOW (ref 32–36)
MCV RBC AUTO: 84.4 FL — SIGNIFICANT CHANGE UP (ref 80–100)
MRSA PCR RESULT.: SIGNIFICANT CHANGE UP
NRBC # BLD: 0 /100 WBCS — SIGNIFICANT CHANGE UP (ref 0–0)
PHOSPHATE SERPL-MCNC: 5 MG/DL — HIGH (ref 2.5–4.5)
PLATELET # BLD AUTO: 173 K/UL — SIGNIFICANT CHANGE UP (ref 150–400)
POTASSIUM SERPL-MCNC: 3.5 MMOL/L — SIGNIFICANT CHANGE UP (ref 3.5–5.3)
POTASSIUM SERPL-SCNC: 3.5 MMOL/L — SIGNIFICANT CHANGE UP (ref 3.5–5.3)
RBC # BLD: 4.05 M/UL — SIGNIFICANT CHANGE UP (ref 3.8–5.2)
RBC # FLD: 13.5 % — SIGNIFICANT CHANGE UP (ref 10.3–14.5)
S AUREUS DNA NOSE QL NAA+PROBE: DETECTED
SODIUM SERPL-SCNC: 142 MMOL/L — SIGNIFICANT CHANGE UP (ref 135–145)
WBC # BLD: 4.64 K/UL — SIGNIFICANT CHANGE UP (ref 3.8–10.5)
WBC # FLD AUTO: 4.64 K/UL — SIGNIFICANT CHANGE UP (ref 3.8–10.5)

## 2019-06-06 PROCEDURE — 36598 INJ W/FLUOR EVAL CV DEVICE: CPT | Mod: 58

## 2019-06-06 RX ORDER — MUPIROCIN 20 MG/G
0 OINTMENT TOPICAL
Qty: 0 | Refills: 0 | DISCHARGE
Start: 2019-06-06

## 2019-06-06 RX ORDER — CALCIUM ACETATE 667 MG
0 TABLET ORAL
Qty: 0 | Refills: 0 | DISCHARGE
Start: 2019-06-06

## 2019-06-06 RX ORDER — MUPIROCIN 20 MG/G
1 OINTMENT TOPICAL EVERY 12 HOURS
Refills: 0 | Status: DISCONTINUED | OUTPATIENT
Start: 2019-06-06 | End: 2019-06-07

## 2019-06-06 RX ADMIN — Medication 0.5 MILLIGRAM(S): at 17:02

## 2019-06-06 RX ADMIN — HEPARIN SODIUM 5000 UNIT(S): 5000 INJECTION INTRAVENOUS; SUBCUTANEOUS at 05:51

## 2019-06-06 RX ADMIN — Medication 0.5 MILLIGRAM(S): at 05:51

## 2019-06-06 RX ADMIN — INSULIN GLARGINE 15 UNIT(S): 100 INJECTION, SOLUTION SUBCUTANEOUS at 21:46

## 2019-06-06 RX ADMIN — Medication 667 MILLIGRAM(S): at 17:02

## 2019-06-06 RX ADMIN — Medication 1 SPRAY(S): at 17:05

## 2019-06-06 RX ADMIN — Medication 25 MICROGRAM(S): at 05:51

## 2019-06-06 RX ADMIN — Medication 667 MILLIGRAM(S): at 09:24

## 2019-06-06 RX ADMIN — MUPIROCIN 1 APPLICATION(S): 20 OINTMENT TOPICAL at 17:02

## 2019-06-06 RX ADMIN — Medication 1 SPRAY(S): at 05:51

## 2019-06-06 RX ADMIN — Medication 667 MILLIGRAM(S): at 11:34

## 2019-06-06 RX ADMIN — HEPARIN SODIUM 5000 UNIT(S): 5000 INJECTION INTRAVENOUS; SUBCUTANEOUS at 17:02

## 2019-06-06 RX ADMIN — CHLORHEXIDINE GLUCONATE 1 APPLICATION(S): 213 SOLUTION TOPICAL at 11:34

## 2019-06-06 NOTE — PROGRESS NOTE ADULT - PROBLEM SELECTOR PLAN 3
On 3 month interval IM antipsychotic  - Mood is currently pleasant
Resumed Lantus 15 U; C/w diabetic diet w/ ISS  - Monitor FS and f/u HgA1c

## 2019-06-06 NOTE — PROGRESS NOTE ADULT - PROBLEM SELECTOR PLAN 4
IMPROVE VTE Individual Risk Assessment    RISK                                                          Points  [] Previous VTE                                           3  [] Thrombophilia                                        2  [] Lower limb paralysis                              2   [] Current Cancer                                       2   [x] Immobilization > 24 hrs                        1  [] ICU/CCU stay > 24 hours                       1  [] Age > 60                                                   1    IMPROVE VTE Score: 1, no indication for DVT PPx
On 3 month interval IM antipsychotic  - Mood is currently pleasant

## 2019-06-06 NOTE — DISCHARGE NOTE NURSING/CASE MANAGEMENT/SOCIAL WORK - NSDCDPATPORTLINK_GEN_ALL_CORE
You can access the [facility] Patient Portal, offered by [facility], by registering with the following website: [email or web address]

## 2019-06-06 NOTE — PROGRESS NOTE ADULT - PROBLEM SELECTOR PLAN 1
Patient with HepBsAg + on admission  Previously negative (jan 2019) before HD was initiated  GI Dr Monzon consulted
Missed HD Friday 2/2 clotted PC  -s/p PC exchange on 6/3, however cath didn't work  -patient went for exchange on 6/5, clot aspirated, flushing well  -HD as per nephro  D/c planning
Patient with HepBsAg + on admission  Previously negative (jan 2019) before HD was initiated  GI Dr Monzon consulted [facility] hep Igs and antibodies:  surface Ag negative now, probably a false positive before
Missed HD Friday 2/2 clotted PC  - Afebrile and CBC/BMP WNLs; no emergent indications for HD  - Permacath was flowing after being flushed in ER, however HD was difficult as PC malfunctioning  -IR consulted for exchange  -Nephro Dr Lemus

## 2019-06-06 NOTE — PROGRESS NOTE ADULT - ASSESSMENT
54 y/o F PMH ESRD (2/2019, unknown etiology, on HD via R-PC at [facility]), DM, HLD, and Bipolar Disorder presents from [facility] for missed hemodialysis 2/2 malfunctioning PC - now functioning - admitting for HD

## 2019-06-06 NOTE — DISCHARGE NOTE PROVIDER - HOSPITAL COURSE
54 y/o F PMH ESRD (2/2019, unknown etiology, on HD via R-PC at [facility]), DM, HLD, and Bipolar Disorder presents from [facility] for missed hemodialysis - patient in ED the day prior from HD center 2/2 clogged Permacath which was flushed in ED and hence discharged back. Patient returned today 2/2 [facility] HD center being closed on weekend. Otherwise patient no acute complaints. Patient planned for AVF in July.        Patient was admitted to floor for HD, after cath was flushed in ER, but did not function during HD: IR was called for cath exchange, did not function during HD. Patient was sent for a second IR exchange of PC, however a clot was aspirated and cath flushed well and patient underwent a successful session of HD. As per IR, cath needs to be flushed after HD.        Patient was 52 y/o F PMH ESRD (2/2019, unknown etiology, on HD via R-PC at [facility]), DM, HLD, and Bipolar Disorder presents from [facility] for missed hemodialysis - patient in ED the day prior from HD center 2/2 clogged Permacath which was flushed in ED and hence discharged back. Patient returned today 2/2 [facility] HD center being closed on weekend. Otherwise patient no acute complaints. Patient planned for AVF in July.        Patient was admitted to floor for HD, after cath was flushed in ER, but did not function during HD: IR was called for cath exchange, did not function during HD. Patient was sent for a second IR exchange of PC, however a clot was aspirated and cath flushed well and patient underwent a successful session of HD. As per IR, cath needs to be flushed after HD. Nephro Dr Lemus, pt was also started on phoslo for elevated phos levels.         Patient was also noted to have positive hBsAG fro which Dr Monzon was consulted, however repeat test negative indicating a false positive previously.         Patient is clinically stable for discharge back to [facility] as discussed with attending

## 2019-06-06 NOTE — PROGRESS NOTE ADULT - PROBLEM SELECTOR PLAN 5
IMPROVE VTE Individual Risk Assessment    RISK                                                          Points  [] Previous VTE                                           3  [] Thrombophilia                                        2  [] Lower limb paralysis                              2   [] Current Cancer                                       2   [x] Immobilization > 24 hrs                        1  [] ICU/CCU stay > 24 hours                       1  [] Age > 60                                                   1    IMPROVE VTE Score: 1, no indication for DVT PPx

## 2019-06-06 NOTE — DISCHARGE NOTE PROVIDER - CARE PROVIDER_API CALL
Heath Mendez; MBBS)  Internal Medicine  [address] [place] [address]  Phone: [phone]  Fax: [phone]  Follow Up Time: 1 week

## 2019-06-06 NOTE — PROGRESS NOTE ADULT - ASSESSMENT
A/P:  1. ESRD on HD TTS schedule in the hospital and M/W/F schedule as outpatient  -pt with s/p PC check by IR  -pt will be dialyzed this pm with heparin to avoid clot in the catheter  -pt also needs avf by vascular surgery as outpatient and LUE precautions in place  -f/u bmp later today  2.. ANEMIA: pt currently has  acceptble Hb - No need for Epogen.  -f/u Hb daily  3. MBD: PO4 is mildly high  -continue  phoslo as ordered  Keep patient euvolemic and HD renal diet  Avoid Nephrtoxic Meds/ Agents such as NSAIDs, Gadolinium contrast, Phosphate containing enemas, etc..)  Adjust Medications according to eGFR A/P:  1. ESRD on HD TTS schedule in the hospital and M/W/F schedule as outpatient  -pt was dialyzed via PC with acceptble blood flow   -pt is ok for d/c to adult home and f/u for hd at [facility]  -pt needs avf by vascular surgery as outpatient and LUE precautions in place  2.. ANEMIA: pt currently has  acceptble Hb   -pt will get epogen at dialysis ctr tomorrow  -f/u Hb daily  3. MBD: PO4 is acceptble  -continue  phoslo as ordered  Keep patient euvolemic and HD renal diet  Avoid Nephrtoxic Meds/ Agents such as NSAIDs, Gadolinium contrast, Phosphate containing enemas, etc..)  Adjust Medications according to eGFR

## 2019-06-06 NOTE — PROGRESS NOTE ADULT - PROBLEM SELECTOR PLAN 2
Resumed Lantus 15 U; C/w diabetic diet w/ ISS  - Monitor FS and f/u HgA1c
Missed HD Friday 2/2 clotted PC  -s/p PC exchange,   -HD today
Missed HD Friday 2/2 clotted PC  -s/p PC exchange on 6/3, however cath didn't work  -patient went for exchange today, clot aspirated, flushing well  -HD as per nephro
Patient with HepBsAg + on admission  Previously negative (jan 2019) before HD was initiated  GI Dr Monzon consulted [facility] hep Igs and antibodies:  surface Ag negative now, probably a false positive before

## 2019-06-06 NOTE — PROGRESS NOTE ADULT - SUBJECTIVE AND OBJECTIVE BOX
pt seen and examined.pts current chart reviewed and case discussed with resident covering.    SUBJECTIVE:  pt feels fine and denies cp,sob,gi or gu/uremic  symptons    REVIEW OF SYSTEMS:  CONSTITUTIONAL: No weakness, fevers or chills  EYES/ENT: No visual changes;  No vertigo or throat pain   NECK: No pain or stiffness  RESPIRATORY: No cough, wheezing, hemoptysis; No shortness of breath  CARDIOVASCULAR: No chest pain or palpitations  GASTROINTESTINAL: No abdominal or epigastric pain. No nausea, vomiting, or hematemesis; No diarrhea or constipation. No melena or hematochezia.  GENITOURINARY: No dysuria, frequency , hematuria, flank pain or nocturia  NEUROLOGICAL: No numbness or weakness  SKIN: No itching, burning, rashes, or lesions   All other review of systems is negative unless indicated above    Current meds:    benztropine 0.5 milliGRAM(s) Oral two times a day  calcium acetate 667 milliGRAM(s) Oral three times a day with meals  chlorhexidine 2% Cloths 1 Application(s) Topical daily  chlorhexidine 2% Cloths 1 Application(s) Topical daily  fluticasone propionate 50 MICROgram(s)/spray Nasal Spray 1 Spray(s) Both Nostrils two times a day  heparin  Injectable 5000 Unit(s) SubCutaneous every 12 hours  insulin glargine Injectable (LANTUS) 15 Unit(s) SubCutaneous at bedtime  insulin lispro (HumaLOG) corrective regimen sliding scale   SubCutaneous Before meals and at bedtime  levothyroxine 25 MICROGram(s) Oral daily  mupirocin 2% Nasal 1 Application(s) Nasal every 12 hours      Vital Signs    T(F): 97.9 (19 @ 15:46), Max: 98.3 (19 @ 22:09)  HR: 57 (19 @ 15:46) (55 - 69)  BP: 101/51 (19 @ 15:46) (95/51 - 110/60)  ABP: --  RR: 16 (19 @ 15:46) (16 - 17)  SpO2: 98% (19 @ 15:46) (98% - 100%)  Wt(kg): --  CVP(cm H2O): --  CO: --  PCWP: --    I and O's:    Daily     Daily Weight in k.6 (2019 22:09)    PHYSICAL EXAM:  Constitutional: well developed, well nourished  and in nad  HEENT: PERRLA,  no icteric sclera and mild pallor of conjunctiva noted  Neck: No JVD  PC noted  Respiratory: reduced air entry lower lungs with no rales, wheezing or rhonchi  Cardiovascular: S1 and S2 normally heard  Gastrointestinal: soft, nondistended, nontender and normal bowel sounds heard  Extremities: No peripheral edema or cyanosis  Neurological: A/O x 3, no focal deficits  : No flank or cva tenderness palpated.  Skin: No rashes      LABS:    CBC:                          10.6   4.64  )-----------( 173      ( 2019 05:56 )             34.2           BMP:        142  |  105  |  44<H>  ----------------------------<  67<L>  3.5   |  27  |  5.45<H>  -    139  |  105  |  86<H>  ----------------------------<  154<H>  3.8   |  26  |  8.14<H>    Ca    8.5      2019 05:56  Ca    8.3<L>      2019 19:26  Phos  5.0     06-06  Phos  4.5     06-05  Mg     2.0     06-06  Mg     1.9     06-05            URINE STUDIES:                        RADIOLOGY & ADDITIONAL STUDIES: pt seen and examined    SUBJECTIVE:  pt  denies cp,sob,gi or uremic  symptoms      Current meds:    benztropine 0.5 milliGRAM(s) Oral two times a day  calcium acetate 667 milliGRAM(s) Oral three times a day with meals  chlorhexidine 2% Cloths 1 Application(s) Topical daily  chlorhexidine 2% Cloths 1 Application(s) Topical daily  fluticasone propionate 50 MICROgram(s)/spray Nasal Spray 1 Spray(s) Both Nostrils two times a day  heparin  Injectable 5000 Unit(s) SubCutaneous every 12 hours  insulin glargine Injectable (LANTUS) 15 Unit(s) SubCutaneous at bedtime  insulin lispro (HumaLOG) corrective regimen sliding scale   SubCutaneous Before meals and at bedtime  levothyroxine 25 MICROGram(s) Oral daily  mupirocin 2% Nasal 1 Application(s) Nasal every 12 hours      Vital Signs    T(F): 97.9 (19 @ 15:46), Max: 98.3 (19 @ 22:09)  HR: 57 (19 @ 15:46) (55 - 69)  BP: 101/51 (19 @ 15:46) (95/51 - 110/60)  ABP: --  RR: 16 (19 @ 15:46) (16 - 17)  SpO2: 98% (19 @ 15:46) (98% - 100%)  Wt(kg): --  CVP(cm H2O): --  CO: --  PCWP: --    I and O's:    Daily     Daily Weight in k.6 (2019 22:09)    PHYSICAL EXAM:  Constitutional: well developed, well nourished  and in nad  HEENT: PERRLA,  no icteric sclera and mild pallor of conjunctiva noted  Neck: No JVD  PC noted  Respiratory: reduced air entry lower lungs with no rales, wheezing or rhonchi  Cardiovascular: S1 and S2 normally heard  Gastrointestinal: soft, nondistended, nontender and normal bowel sounds heard  Extremities: No peripheral edema or cyanosis  Neurological: A/O x 3, no focal deficits  : No flank or cva tenderness palpated.  Skin: No rashes      LABS:    CBC:                          10.6   4.64  )-----------( 173      ( 2019 05:56 )             34.2           BMP:    06-    142  |  105  |  44<H>  ----------------------------<  67<L>  3.5   |  27  |  5.45<H>      139  |  105  |  86<H>  ----------------------------<  154<H>  3.8   |  26  |  8.14<H>    Ca    8.5      2019 05:56  Ca    8.3<L>      2019 19:26  Phos  5.0     -  Phos  4.5     -  Mg     2.0     -  Mg     1.9     -

## 2019-06-06 NOTE — DISCHARGE NOTE PROVIDER - NSDCCPCAREPLAN_GEN_ALL_CORE_FT
PRINCIPAL DISCHARGE DIAGNOSIS  Diagnosis: Hemodialysis catheter malfunction  Assessment and Plan of Treatment: You presented with a nonfunctioning hemodialysis catheter which was exchanged by interventional radiology team.  Your catheter however  would not function again and was checked by IR, tiny blood clot was aspirated. You are recommended locking catheter with heparin solutions after dialysis to prevent clots in cath. Your catheter is now functioning well. Continue hemodialysis sessions.      SECONDARY DISCHARGE DIAGNOSES  Diagnosis: ESRD on dialysis  Assessment and Plan of Treatment: Your phosphorus level was notedt to be high for which you were started on phoslo by nephrologist, continue the same. Continue HD sessions as per your schedule. Our  has resumed your HD. Follow up with your nephrologist.    Diagnosis: Diabetes  Assessment and Plan of Treatment: Continue with your home regimen.    Diagnosis: Bipolar disorder  Assessment and Plan of Treatment: Continue with your home medications PRINCIPAL DISCHARGE DIAGNOSIS  Diagnosis: Hemodialysis catheter malfunction  Assessment and Plan of Treatment: You presented with a nonfunctioning hemodialysis catheter which was exchanged by interventional radiology team.  Your catheter however  would not function again and was checked by IR, tiny blood clot was aspirated. You are recommended locking catheter with heparin solutions after dialysis to prevent clots in cath. Your catheter is now functioning well. Continue hemodialysis sessions.      SECONDARY DISCHARGE DIAGNOSES  Diagnosis: ESRD on dialysis  Assessment and Plan of Treatment: Your phosphorus level was notedt to be high for which you were started on phoslo by nephrologist, continue the same. Continue HD sessions as per your schedule. Our  has resumed your HD. Follow up with your nephrologist.    Diagnosis: MSSA (methicillin-susceptible Staphylococcus aureus) colonization  Assessment and Plan of Treatment: As part of prophylactic measures, you were swabbed fro staphylococccal colonization: nasal swab was positive fro MSSA. You are recommended mupirocin nasal ointment for 5 days as prescribed    Diagnosis: Diabetes  Assessment and Plan of Treatment: Continue with your home regimen.    Diagnosis: Bipolar disorder  Assessment and Plan of Treatment: Continue with your home medications

## 2019-06-07 VITALS
SYSTOLIC BLOOD PRESSURE: 90 MMHG | OXYGEN SATURATION: 98 % | RESPIRATION RATE: 16 BRPM | TEMPERATURE: 98 F | DIASTOLIC BLOOD PRESSURE: 50 MMHG | HEART RATE: 53 BPM

## 2019-06-07 LAB
GLUCOSE BLDC GLUCOMTR-MCNC: 88 MG/DL — SIGNIFICANT CHANGE UP (ref 70–99)
GLUCOSE BLDC GLUCOMTR-MCNC: 93 MG/DL — SIGNIFICANT CHANGE UP (ref 70–99)

## 2019-06-07 PROCEDURE — 99285 EMERGENCY DEPT VISIT HI MDM: CPT | Mod: 25

## 2019-06-07 PROCEDURE — 86704 HEP B CORE ANTIBODY TOTAL: CPT

## 2019-06-07 PROCEDURE — 86706 HEP B SURFACE ANTIBODY: CPT

## 2019-06-07 PROCEDURE — 94640 AIRWAY INHALATION TREATMENT: CPT

## 2019-06-07 PROCEDURE — 81025 URINE PREGNANCY TEST: CPT

## 2019-06-07 PROCEDURE — 87350 HEPATITIS BE AG IA: CPT

## 2019-06-07 PROCEDURE — 84100 ASSAY OF PHOSPHORUS: CPT

## 2019-06-07 PROCEDURE — 87340 HEPATITIS B SURFACE AG IA: CPT

## 2019-06-07 PROCEDURE — 82746 ASSAY OF FOLIC ACID SERUM: CPT

## 2019-06-07 PROCEDURE — 85027 COMPLETE CBC AUTOMATED: CPT

## 2019-06-07 PROCEDURE — 36581 REPLACE TUNNELED CV CATH: CPT

## 2019-06-07 PROCEDURE — 36415 COLL VENOUS BLD VENIPUNCTURE: CPT

## 2019-06-07 PROCEDURE — 80048 BASIC METABOLIC PNL TOTAL CA: CPT

## 2019-06-07 PROCEDURE — 80061 LIPID PANEL: CPT

## 2019-06-07 PROCEDURE — 80074 ACUTE HEPATITIS PANEL: CPT

## 2019-06-07 PROCEDURE — 84443 ASSAY THYROID STIM HORMONE: CPT

## 2019-06-07 PROCEDURE — 85610 PROTHROMBIN TIME: CPT

## 2019-06-07 PROCEDURE — 86707 HEPATITIS BE ANTIBODY: CPT

## 2019-06-07 PROCEDURE — 99283 EMERGENCY DEPT VISIT LOW MDM: CPT

## 2019-06-07 PROCEDURE — 80053 COMPREHEN METABOLIC PANEL: CPT

## 2019-06-07 PROCEDURE — 82607 VITAMIN B-12: CPT

## 2019-06-07 PROCEDURE — 82962 GLUCOSE BLOOD TEST: CPT

## 2019-06-07 PROCEDURE — 85730 THROMBOPLASTIN TIME PARTIAL: CPT

## 2019-06-07 PROCEDURE — 84702 CHORIONIC GONADOTROPIN TEST: CPT

## 2019-06-07 PROCEDURE — 83036 HEMOGLOBIN GLYCOSYLATED A1C: CPT

## 2019-06-07 PROCEDURE — 93005 ELECTROCARDIOGRAM TRACING: CPT

## 2019-06-07 PROCEDURE — 99261: CPT

## 2019-06-07 PROCEDURE — 87641 MR-STAPH DNA AMP PROBE: CPT

## 2019-06-07 PROCEDURE — 83735 ASSAY OF MAGNESIUM: CPT

## 2019-06-07 PROCEDURE — 87517 HEPATITIS B DNA QUANT: CPT

## 2019-06-07 PROCEDURE — 87640 STAPH A DNA AMP PROBE: CPT

## 2019-06-07 PROCEDURE — 77001 FLUOROGUIDE FOR VEIN DEVICE: CPT

## 2019-06-07 RX ADMIN — Medication 25 MICROGRAM(S): at 05:41

## 2019-06-07 RX ADMIN — HEPARIN SODIUM 5000 UNIT(S): 5000 INJECTION INTRAVENOUS; SUBCUTANEOUS at 05:42

## 2019-06-07 RX ADMIN — Medication 0.5 MILLIGRAM(S): at 05:41

## 2019-06-07 RX ADMIN — Medication 667 MILLIGRAM(S): at 09:02

## 2019-06-07 RX ADMIN — Medication 667 MILLIGRAM(S): at 13:04

## 2019-06-07 RX ADMIN — MUPIROCIN 1 APPLICATION(S): 20 OINTMENT TOPICAL at 05:42

## 2019-06-07 RX ADMIN — Medication 1 SPRAY(S): at 05:42

## 2019-06-07 NOTE — PROGRESS NOTE ADULT - REASON FOR ADMISSION
hemodialysis

## 2019-06-07 NOTE — PROGRESS NOTE ADULT - ASSESSMENT
A/P:  1. ESRD on HD TTS schedule in the hospital and M/W/F schedule as outpatient  -pt was dialyzed via PC with acceptble blood flow   -pt is ok for d/c to adult home and f/u for hd at [facility]  -pt needs avf by vascular surgery as outpatient and LUE precautions in place  2.. ANEMIA: pt currently has  acceptble Hb   -pt will get epogen at dialysis ctr tomorrow  -f/u Hb daily  3. MBD: PO4 is acceptble  -continue  phoslo as ordered  Keep patient euvolemic and HD renal diet  Avoid Nephrtoxic Meds/ Agents such as NSAIDs, Gadolinium contrast, Phosphate containing enemas, etc..)  Adjust Medications according to eGFR A/P:  1. ESRD on HD TTS schedule in the hospital and M/W/F schedule as outpatient  -pts renal status is stable.pt is being dialyzed   -pt is ok for d/c to adult home post dialysis   -pt needs avf by vascular surgery as outpatient and LUE precautions in place  2.. ANEMIA: pt currently has  acceptble Hb   -f/u Hb daily  3. MBD: PO4 is acceptble  -continue  phoslo as ordered  Keep patient euvolemic and HD renal diet  Avoid Nephrtoxic Meds/ Agents such as NSAIDs, Gadolinium contrast, Phosphate containing enemas, etc..)  Adjust Medications according to eGFR

## 2019-06-07 NOTE — PROGRESS NOTE ADULT - SUBJECTIVE AND OBJECTIVE BOX
pt seen and examined.pts current chart reviewed and case discussed with resident covering.    SUBJECTIVE:  pt feels fine and denies cp,sob,gi or gu/uremic  symptons    REVIEW OF SYSTEMS:  CONSTITUTIONAL: No weakness, fevers or chills  EYES/ENT: No visual changes;  No vertigo or throat pain   NECK: No pain or stiffness  RESPIRATORY: No cough, wheezing, hemoptysis; No shortness of breath  CARDIOVASCULAR: No chest pain or palpitations  GASTROINTESTINAL: No abdominal or epigastric pain. No nausea, vomiting, or hematemesis; No diarrhea or constipation. No melena or hematochezia.  GENITOURINARY: No dysuria, frequency , hematuria, flank pain or nocturia  NEUROLOGICAL: No numbness or weakness  SKIN: No itching, burning, rashes, or lesions   All other review of systems is negative unless indicated above    Current meds:    benztropine 0.5 milliGRAM(s) Oral two times a day  calcium acetate 667 milliGRAM(s) Oral three times a day with meals  chlorhexidine 2% Cloths 1 Application(s) Topical daily  chlorhexidine 2% Cloths 1 Application(s) Topical daily  fluticasone propionate 50 MICROgram(s)/spray Nasal Spray 1 Spray(s) Both Nostrils two times a day  heparin  Injectable 5000 Unit(s) SubCutaneous every 12 hours  insulin glargine Injectable (LANTUS) 15 Unit(s) SubCutaneous at bedtime  insulin lispro (HumaLOG) corrective regimen sliding scale   SubCutaneous Before meals and at bedtime  levothyroxine 25 MICROGram(s) Oral daily  mupirocin 2% Nasal 1 Application(s) Nasal every 12 hours      Vital Signs    T(F): 97.6 (06-07-19 @ 07:54), Max: 97.9 (06-06-19 @ 15:46)  HR: 53 (06-07-19 @ 07:54) (53 - 63)  BP: 90/50 (06-07-19 @ 07:54) (90/50 - 110/66)  ABP: --  RR: 16 (06-07-19 @ 07:54) (16 - 16)  SpO2: 98% (06-07-19 @ 07:54) (98% - 100%)  Wt(kg): --  CVP(cm H2O): --  CO: --  PCWP: --    I and O's:    Daily     Daily     PHYSICAL EXAM:  Constitutional: well developed, well nourished  and in nad  HEENT: PERRLA,  no icteric sclera and mild pallor of conjunctiva noted  Neck: No JVD  PC noted  Respiratory: reduced air entry lower lungs with no rales, wheezing or rhonchi  Cardiovascular: S1 and S2 normally heard  Gastrointestinal: soft, nondistended, nontender and normal bowel sounds heard  Extremities: No peripheral edema or cyanosis  Neurological: A/O x 3, no focal deficits  : No flank or cva tenderness palpated.  Skin: No rashes      LABS:    CBC:                          10.6   4.64  )-----------( 173      ( 06 Jun 2019 05:56 )             34.2           BMP:    06-06    142  |  105  |  44<H>  ----------------------------<  67<L>  3.5   |  27  |  5.45<H>  06-05    139  |  105  |  86<H>  ----------------------------<  154<H>  3.8   |  26  |  8.14<H>    Ca    8.5      06 Jun 2019 05:56  Ca    8.3<L>      05 Jun 2019 19:26  Phos  5.0     06-06  Phos  4.5     06-05  Mg     2.0     06-06  Mg     1.9     06-05            URINE STUDIES:                        RADIOLOGY & ADDITIONAL STUDIES: pt seen and examined.    SUBJECTIVE:  pt seen in dialysis unit  pt feels fine and denies cp,sob,gi or uremic  symptoms  pt will be d/choco back to adult home post dialysis this pm      Current meds:    benztropine 0.5 milliGRAM(s) Oral two times a day  calcium acetate 667 milliGRAM(s) Oral three times a day with meals  chlorhexidine 2% Cloths 1 Application(s) Topical daily  chlorhexidine 2% Cloths 1 Application(s) Topical daily  fluticasone propionate 50 MICROgram(s)/spray Nasal Spray 1 Spray(s) Both Nostrils two times a day  heparin  Injectable 5000 Unit(s) SubCutaneous every 12 hours  insulin glargine Injectable (LANTUS) 15 Unit(s) SubCutaneous at bedtime  insulin lispro (HumaLOG) corrective regimen sliding scale   SubCutaneous Before meals and at bedtime  levothyroxine 25 MICROGram(s) Oral daily  mupirocin 2% Nasal 1 Application(s) Nasal every 12 hours      Vital Signs    T(F): 97.6 (06-07-19 @ 07:54), Max: 97.9 (06-06-19 @ 15:46)  HR: 53 (06-07-19 @ 07:54) (53 - 63)  BP: 90/50 (06-07-19 @ 07:54) (90/50 - 110/66)  ABP: --  RR: 16 (06-07-19 @ 07:54) (16 - 16)  SpO2: 98% (06-07-19 @ 07:54) (98% - 100%)  Wt(kg): --  CVP(cm H2O): --  CO: --  PCWP: --    I and O's:    Daily     Daily     PHYSICAL EXAM:  Constitutional: well developed, well nourished  and in nad  HEENT: PERRLA,  no icteric sclera and mild pallor of conjunctiva noted  Neck: No JVD  PC noted  Respiratory: reduced air entry lower lungs with no rales, wheezing or rhonchi  Cardiovascular: S1 and S2 normally heard  Gastrointestinal: soft, nondistended, nontender and normal bowel sounds heard  Extremities: No peripheral edema or cyanosis  Neurological: A/O x 3, no focal deficits  : No flank or cva tenderness palpated.  Skin: No rashes      LABS: no new labs today    CBC:                          10.6   4.64  )-----------( 173      ( 06 Jun 2019 05:56 )             34.2           BMP:    06-06    142  |  105  |  44<H>  ----------------------------<  67<L>  3.5   |  27  |  5.45<H>  06-05    139  |  105  |  86<H>  ----------------------------<  154<H>  3.8   |  26  |  8.14<H>    Ca    8.5      06 Jun 2019 05:56  Ca    8.3<L>      05 Jun 2019 19:26  Phos  5.0     06-06  Phos  4.5     06-05  Mg     2.0     06-06  Mg     1.9     06-05

## 2019-06-25 ENCOUNTER — OUTPATIENT (OUTPATIENT)
Dept: OUTPATIENT SERVICES | Facility: HOSPITAL | Age: 54
LOS: 1 days | End: 2019-06-25
Payer: MEDICARE

## 2019-06-25 VITALS
SYSTOLIC BLOOD PRESSURE: 104 MMHG | HEIGHT: 58 IN | DIASTOLIC BLOOD PRESSURE: 62 MMHG | RESPIRATION RATE: 16 BRPM | HEART RATE: 66 BPM | OXYGEN SATURATION: 98 % | WEIGHT: 154.1 LBS | TEMPERATURE: 98 F

## 2019-06-25 DIAGNOSIS — N18.6 END STAGE RENAL DISEASE: ICD-10-CM

## 2019-06-25 DIAGNOSIS — Z98.890 OTHER SPECIFIED POSTPROCEDURAL STATES: Chronic | ICD-10-CM

## 2019-06-25 DIAGNOSIS — K08.409 PARTIAL LOSS OF TEETH, UNSPECIFIED CAUSE, UNSPECIFIED CLASS: Chronic | ICD-10-CM

## 2019-06-25 DIAGNOSIS — Z01.818 ENCOUNTER FOR OTHER PREPROCEDURAL EXAMINATION: ICD-10-CM

## 2019-06-25 PROCEDURE — G0463: CPT

## 2019-06-25 PROCEDURE — 87640 STAPH A DNA AMP PROBE: CPT

## 2019-06-25 RX ORDER — BENZTROPINE MESYLATE 1 MG
1 TABLET ORAL
Qty: 0 | Refills: 0 | DISCHARGE

## 2019-06-25 RX ORDER — PALIPERIDONE 1.5 MG/1
0 TABLET, EXTENDED RELEASE ORAL
Qty: 0 | Refills: 0 | DISCHARGE

## 2019-06-25 NOTE — H&P PST ADULT - NSICDXPROBLEM_GEN_ALL_CORE_FT
PROBLEM DIAGNOSES  Problem: End stage renal disease  Assessment and Plan: Patient is scheduled for left elbow  arteriovenous fistula creation on 7/9/19.

## 2019-06-25 NOTE — H&P PST ADULT - IF HCP IS NOT ON CHART, IDENTIFY THE PERSONS NAME AND PHONE NUMBER CONTACTED TO BRING IN DOCUMENT
called sister Shayna to confirm that she is HCP and called [facility] , spoke to Nurse to bring copy of HCP on day of sugery- to obtain telephone  consent for sx from sister Shayna if needed

## 2019-06-25 NOTE — H&P PST ADULT - MENTAL STATUS
alert to date, place and name ( stated today's date, own  name , , purpose of today's visit, today 's date, name of president, knows where she is), sister Shayna is health proxy

## 2019-06-25 NOTE — H&P PST ADULT - HISTORY OF PRESENT ILLNESS
54 y/o F PMH ESRD (2/2019, unknown etiology, on HD via R-PC at [facility]), DM, HLD, and Bipolar Disorder presents from [facility] for missed hemodialysis - patient in ED the day prior from HD center 2/2 clogged Permacath which was flushed in ED and hence discharged back. Patient returned today 2/2 [facility] HD center being closed on weekend. Otherwise patient no acute complaints. Patient planned for AVF in July. right neck catheter, , left groin catheter 53 years old female from [facility] presented to PST for evaluation before surgery, pt is on HD from 2/2019  via right chest permacath  at [facility] M- Wed- Fri ), pt was diagnosed with end stage renal disease and is scheduled for left elbow  arteriovenous fistula creation on 7/9/19.

## 2019-06-25 NOTE — H&P PST ADULT - NSICDXPASTSURGICALHX_GEN_ALL_CORE_FT
PAST SURGICAL HISTORY:  [place] teeth removed 4 years ago PAST SURGICAL HISTORY:  History of vascular access device 2/2019 insertion to right neck, changer 2/2019 to left groin, changer to right chest 06/19    [place] teeth removed 4 years ago

## 2019-06-25 NOTE — H&P PST ADULT - VENOUS THROMBOEMBOLISM CURRENT STATUS
(1) other risk factor (includes escalating BMI, pack-years of smoking, diabetes requiring insulin, chemotherapy, female gender and length of surgery) (2) central venous access/(1) other risk factor (includes escalating BMI, pack-years of smoking, diabetes requiring insulin, chemotherapy, female gender and length of surgery)

## 2019-06-25 NOTE — H&P PST ADULT - NEGATIVE PSYCHIATRIC SYMPTOMS
no insomnia/no suicidal ideation/no anxiety/hx of schizophrenia, bipolar depression , schizoaffective disorder , lower intellectual functioning

## 2019-06-25 NOTE — H&P PST ADULT - ASSESSMENT
53 years old female from [facility] presented with end stage renal disease.

## 2019-06-25 NOTE — H&P PST ADULT - NEGATIVE ENMT SYMPTOMS
no tinnitus/no sinus symptoms/no nasal discharge/no nasal congestion/no throat pain/no nose bleeds/no recurrent cold sores/no dry mouth/no dysphagia/no vertigo/no post-nasal discharge/no abnormal taste sensation/no ear pain/no hearing difficulty/no gum bleeding/no nasal obstruction

## 2019-06-25 NOTE — H&P PST ADULT - NEGATIVE GASTROINTESTINAL SYMPTOMS
no nausea/no diarrhea/no vomiting/no flatulence/no abdominal pain/no change in bowel habits/no constipation

## 2019-06-25 NOTE — H&P PST ADULT - NSICDXPASTMEDICALHX_GEN_ALL_CORE_FT
PAST MEDICAL HISTORY:  Bipolar disorder     CAD (coronary artery disease)     Diabetes     ESRD on dialysis     Schizophrenia PAST MEDICAL HISTORY:  Anemia     Bilateral dry eyes     Bipolar depression     Bradycardia asymptomatic- hx of    CAD (coronary artery disease)     Chronic GERD     DM (diabetes mellitus), type 2     End-stage renal disease (ESRD) HD on M- W- FR from 2/2019    HLD (hyperlipidemia)     Hypertension no on meds now    Hypothyroidism     Mental deficiency lower intelectual functioning    Mild obesity BMI- 32.2    Schizoaffective disorder     Schizophrenia

## 2019-06-25 NOTE — H&P PST ADULT - RS GEN PE MLT RESP DETAILS PC
good air movement/breath sounds equal no rhonchi/no wheezes/clear to auscultation bilaterally/good air movement/respirations non-labored/no rales/breath sounds equal

## 2019-06-25 NOTE — H&P PST ADULT - GASTROINTESTINAL DETAILS
soft/nontender/no distention normal/no bruit/no rebound tenderness/nontender/soft/no masses palpable/bowel sounds normal/no distention

## 2019-06-25 NOTE — H&P PST ADULT - NEGATIVE NEUROLOGICAL SYMPTOMS
no headache/no hemiparesis/no facial palsy/no vertigo/no syncope/no difficulty walking/no loss of consciousness/no transient paralysis/no paresthesias/no focal seizures/no generalized seizures/no confusion/no tremors/no loss of sensation

## 2019-06-25 NOTE — H&P PST ADULT - NEGATIVE OPHTHALMOLOGIC SYMPTOMS
no discharge R/no pain L/no diplopia/no photophobia/no blurred vision R/no lacrimation L/no irritation R/no lacrimation R/no blurred vision L/no discharge L/no pain R/no irritation L

## 2019-06-25 NOTE — H&P PST ADULT - COMMENTS
Spoke to Dr. Sanchez , Cardiology , he was admitting doctor for pt in June 2019 at [facility], he is going to provide cardiac clearance , pt to have today tomorrow 0626/19 medical clearance for Dr Mendez PCP, given preop instructions to pt to give to nurse and spoke to nurse over the phone, the [facility] has PST phone # if any questions 07/9/19 as per Lissette Delacruz, NP - spoke over phone with Dr. Sanchez Cardiology over the phone at 835 am , who was admitting doctor of patient  in June 2019 hospital stay and as per him patient  is cardiologically optimized for surgery, no further studies required, Dr. Belcher Anesthesia aware, patient  has medical clearance from Dr. Vanessa Amin MD 07/9/19 as per Lissette Delacruz, NP - spoke over phone with Dr. Sanchez Cardiology over the phone at 835 am , who was admitting doctor of patient  in June 2019 hospital stay and as per him patient  is cardiologically optimized for surgery, no further studies required, Dr. Belcher Anesthesia aware, patient  has medical clearance from Dr. Vanessa Amin MD- Dr. Sanchez is unable to write cardiology clearance in chart at this time

## 2019-06-25 NOTE — H&P PST ADULT - NEGATIVE GENERAL GENITOURINARY SYMPTOMS
no incontinence/no hematuria/no renal colic/no flank pain R/no urinary hesitancy/no flank pain L/no gas in urine/no bladder infections/ESRD on HD/no dysuria/no urine discoloration/no nocturia

## 2019-06-26 LAB
MRSA PCR RESULT.: SIGNIFICANT CHANGE UP
S AUREUS DNA NOSE QL NAA+PROBE: SIGNIFICANT CHANGE UP

## 2019-07-08 ENCOUNTER — TRANSCRIPTION ENCOUNTER (OUTPATIENT)
Age: 54
End: 2019-07-08

## 2019-07-09 ENCOUNTER — OUTPATIENT (OUTPATIENT)
Dept: OUTPATIENT SERVICES | Facility: HOSPITAL | Age: 54
LOS: 1 days | End: 2019-07-09
Payer: MEDICARE

## 2019-07-09 VITALS
SYSTOLIC BLOOD PRESSURE: 99 MMHG | HEART RATE: 63 BPM | TEMPERATURE: 98 F | DIASTOLIC BLOOD PRESSURE: 63 MMHG | RESPIRATION RATE: 18 BRPM | HEIGHT: 58 IN | WEIGHT: 154.1 LBS | OXYGEN SATURATION: 100 %

## 2019-07-09 VITALS
HEART RATE: 64 BPM | DIASTOLIC BLOOD PRESSURE: 60 MMHG | RESPIRATION RATE: 16 BRPM | SYSTOLIC BLOOD PRESSURE: 100 MMHG | TEMPERATURE: 98 F | OXYGEN SATURATION: 100 %

## 2019-07-09 DIAGNOSIS — Z98.890 OTHER SPECIFIED POSTPROCEDURAL STATES: Chronic | ICD-10-CM

## 2019-07-09 DIAGNOSIS — N18.6 END STAGE RENAL DISEASE: ICD-10-CM

## 2019-07-09 DIAGNOSIS — K08.409 PARTIAL LOSS OF TEETH, UNSPECIFIED CAUSE, UNSPECIFIED CLASS: Chronic | ICD-10-CM

## 2019-07-09 LAB
ANION GAP SERPL CALC-SCNC: 9 MMOL/L — SIGNIFICANT CHANGE UP (ref 5–17)
BUN SERPL-MCNC: 49 MG/DL — HIGH (ref 7–18)
CALCIUM SERPL-MCNC: 8.9 MG/DL — SIGNIFICANT CHANGE UP (ref 8.4–10.5)
CHLORIDE SERPL-SCNC: 100 MMOL/L — SIGNIFICANT CHANGE UP (ref 96–108)
CO2 SERPL-SCNC: 28 MMOL/L — SIGNIFICANT CHANGE UP (ref 22–31)
CREAT SERPL-MCNC: 6.16 MG/DL — HIGH (ref 0.5–1.3)
GLUCOSE BLDC GLUCOMTR-MCNC: 129 MG/DL — HIGH (ref 70–99)
GLUCOSE BLDC GLUCOMTR-MCNC: 144 MG/DL — HIGH (ref 70–99)
GLUCOSE SERPL-MCNC: 131 MG/DL — HIGH (ref 70–99)
POTASSIUM SERPL-MCNC: 3.4 MMOL/L — LOW (ref 3.5–5.3)
POTASSIUM SERPL-SCNC: 3.4 MMOL/L — LOW (ref 3.5–5.3)
SODIUM SERPL-SCNC: 137 MMOL/L — SIGNIFICANT CHANGE UP (ref 135–145)

## 2019-07-09 PROCEDURE — 82962 GLUCOSE BLOOD TEST: CPT

## 2019-07-09 PROCEDURE — C1889: CPT

## 2019-07-09 PROCEDURE — 36415 COLL VENOUS BLD VENIPUNCTURE: CPT

## 2019-07-09 PROCEDURE — 36818 AV FUSE UPPR ARM CEPHALIC: CPT

## 2019-07-09 PROCEDURE — 80048 BASIC METABOLIC PNL TOTAL CA: CPT

## 2019-07-09 RX ORDER — FENTANYL CITRATE 50 UG/ML
25 INJECTION INTRAVENOUS
Refills: 0 | Status: DISCONTINUED | OUTPATIENT
Start: 2019-07-09 | End: 2019-07-09

## 2019-07-09 RX ORDER — IBUPROFEN 200 MG
400 TABLET ORAL EVERY 6 HOURS
Refills: 0 | Status: DISCONTINUED | OUTPATIENT
Start: 2019-07-09 | End: 2019-07-09

## 2019-07-09 RX ORDER — DEXTROSE 50 % IN WATER 50 %
25 SYRINGE (ML) INTRAVENOUS ONCE
Refills: 0 | Status: DISCONTINUED | OUTPATIENT
Start: 2019-07-09 | End: 2019-07-09

## 2019-07-09 RX ORDER — ONDANSETRON 8 MG/1
4 TABLET, FILM COATED ORAL ONCE
Refills: 0 | Status: DISCONTINUED | OUTPATIENT
Start: 2019-07-09 | End: 2019-07-09

## 2019-07-09 RX ORDER — SODIUM CHLORIDE 9 MG/ML
3 INJECTION INTRAMUSCULAR; INTRAVENOUS; SUBCUTANEOUS EVERY 8 HOURS
Refills: 0 | Status: DISCONTINUED | OUTPATIENT
Start: 2019-07-09 | End: 2019-07-09

## 2019-07-09 RX ORDER — ASPIRIN/CALCIUM CARB/MAGNESIUM 324 MG
81 TABLET ORAL DAILY
Refills: 0 | Status: DISCONTINUED | OUTPATIENT
Start: 2019-07-09 | End: 2019-07-09

## 2019-07-09 RX ORDER — ACETAMINOPHEN 500 MG
650 TABLET ORAL EVERY 6 HOURS
Refills: 0 | Status: DISCONTINUED | OUTPATIENT
Start: 2019-07-09 | End: 2019-07-09

## 2019-07-09 RX ORDER — OXYCODONE AND ACETAMINOPHEN 5; 325 MG/1; MG/1
2 TABLET ORAL ONCE
Refills: 0 | Status: DISCONTINUED | OUTPATIENT
Start: 2019-07-09 | End: 2019-07-09

## 2019-07-09 RX ORDER — SODIUM CHLORIDE 9 MG/ML
1000 INJECTION, SOLUTION INTRAVENOUS
Refills: 0 | Status: DISCONTINUED | OUTPATIENT
Start: 2019-07-09 | End: 2019-07-09

## 2019-07-09 RX ORDER — BENZTROPINE MESYLATE 1 MG
2 TABLET ORAL DAILY
Refills: 0 | Status: DISCONTINUED | OUTPATIENT
Start: 2019-07-09 | End: 2019-07-09

## 2019-07-09 RX ORDER — FLUTICASONE PROPIONATE 50 MCG
2 SPRAY, SUSPENSION NASAL DAILY
Refills: 0 | Status: DISCONTINUED | OUTPATIENT
Start: 2019-07-09 | End: 2019-07-09

## 2019-07-09 RX ORDER — SODIUM CHLORIDE 9 MG/ML
1000 INJECTION INTRAMUSCULAR; INTRAVENOUS; SUBCUTANEOUS
Refills: 0 | Status: DISCONTINUED | OUTPATIENT
Start: 2019-07-09 | End: 2019-07-09

## 2019-07-09 RX ORDER — GLUCAGON INJECTION, SOLUTION 0.5 MG/.1ML
1 INJECTION, SOLUTION SUBCUTANEOUS ONCE
Refills: 0 | Status: DISCONTINUED | OUTPATIENT
Start: 2019-07-09 | End: 2019-07-09

## 2019-07-09 RX ORDER — LEVOTHYROXINE SODIUM 125 MCG
25 TABLET ORAL DAILY
Refills: 0 | Status: DISCONTINUED | OUTPATIENT
Start: 2019-07-09 | End: 2019-07-09

## 2019-07-09 RX ORDER — SIMVASTATIN 20 MG/1
20 TABLET, FILM COATED ORAL AT BEDTIME
Refills: 0 | Status: DISCONTINUED | OUTPATIENT
Start: 2019-07-09 | End: 2019-07-09

## 2019-07-09 RX ORDER — DEXTROSE 50 % IN WATER 50 %
12.5 SYRINGE (ML) INTRAVENOUS ONCE
Refills: 0 | Status: DISCONTINUED | OUTPATIENT
Start: 2019-07-09 | End: 2019-07-09

## 2019-07-09 RX ORDER — INSULIN LISPRO 100/ML
VIAL (ML) SUBCUTANEOUS
Refills: 0 | Status: DISCONTINUED | OUTPATIENT
Start: 2019-07-09 | End: 2019-07-09

## 2019-07-09 RX ORDER — DEXTROSE 50 % IN WATER 50 %
15 SYRINGE (ML) INTRAVENOUS ONCE
Refills: 0 | Status: DISCONTINUED | OUTPATIENT
Start: 2019-07-09 | End: 2019-07-09

## 2019-07-09 RX ORDER — TRAMADOL HYDROCHLORIDE 50 MG/1
50 TABLET ORAL EVERY 8 HOURS
Refills: 0 | Status: DISCONTINUED | OUTPATIENT
Start: 2019-07-09 | End: 2019-07-09

## 2019-07-09 RX ORDER — INSULIN LISPRO 100/ML
VIAL (ML) SUBCUTANEOUS AT BEDTIME
Refills: 0 | Status: DISCONTINUED | OUTPATIENT
Start: 2019-07-09 | End: 2019-07-09

## 2019-07-09 NOTE — ASU DISCHARGE PLAN (ADULT/PEDIATRIC) - CALL YOUR DOCTOR IF YOU HAVE ANY OF THE FOLLOWING:
Pain not relieved by Medications/Fever greater than (need to indicate Fahrenheit or Celsius)/Wound/Surgical Site with redness, or foul smelling discharge or pus/Bleeding that does not stop/Numbness, tingling, color or temperature change to extremity

## 2019-07-09 NOTE — ASU DISCHARGE PLAN (ADULT/PEDIATRIC) - CARE PROVIDER_API CALL
Kristopher Monsivais (MD)  Surgery; Thoracic Surgery; Vascular Surgery  [address] [facility] [address], [facility] [address]  [place] [address]  Phone: [phone]  Fax: [phone]  Follow Up Time:

## 2019-08-27 PROBLEM — I10 ESSENTIAL (PRIMARY) HYPERTENSION: Chronic | Status: ACTIVE | Noted: 2019-06-25

## 2019-08-27 PROBLEM — R00.1 BRADYCARDIA, UNSPECIFIED: Chronic | Status: ACTIVE | Noted: 2019-06-25

## 2019-08-27 PROBLEM — K21.9 GASTRO-ESOPHAGEAL REFLUX DISEASE WITHOUT ESOPHAGITIS: Chronic | Status: ACTIVE | Noted: 2019-06-25

## 2019-08-27 PROBLEM — N18.6 END STAGE RENAL DISEASE: Chronic | Status: ACTIVE | Noted: 2019-06-25

## 2019-08-27 PROBLEM — F31.9 BIPOLAR DISORDER, UNSPECIFIED: Chronic | Status: ACTIVE | Noted: 2019-06-25

## 2019-08-27 PROBLEM — H04.123 DRY EYE SYNDROME OF BILATERAL LACRIMAL GLANDS: Chronic | Status: ACTIVE | Noted: 2019-06-25

## 2019-08-27 PROBLEM — E78.5 HYPERLIPIDEMIA, UNSPECIFIED: Chronic | Status: ACTIVE | Noted: 2019-06-25

## 2019-08-27 PROBLEM — E03.9 HYPOTHYROIDISM, UNSPECIFIED: Chronic | Status: ACTIVE | Noted: 2019-06-25

## 2019-08-27 PROBLEM — F25.9 SCHIZOAFFECTIVE DISORDER, UNSPECIFIED: Chronic | Status: ACTIVE | Noted: 2019-06-25

## 2019-08-27 PROBLEM — D64.9 ANEMIA, UNSPECIFIED: Chronic | Status: ACTIVE | Noted: 2019-06-25

## 2019-08-27 PROBLEM — E11.9 TYPE 2 DIABETES MELLITUS WITHOUT COMPLICATIONS: Chronic | Status: ACTIVE | Noted: 2019-06-25

## 2019-08-27 PROBLEM — F79 UNSPECIFIED INTELLECTUAL DISABILITIES: Chronic | Status: ACTIVE | Noted: 2019-06-25

## 2019-08-27 PROBLEM — E66.9 OBESITY, UNSPECIFIED: Chronic | Status: ACTIVE | Noted: 2019-06-25

## 2019-08-29 ENCOUNTER — OUTPATIENT (OUTPATIENT)
Dept: OUTPATIENT SERVICES | Facility: HOSPITAL | Age: 54
LOS: 1 days | End: 2019-08-29
Payer: MEDICARE

## 2019-08-29 VITALS
RESPIRATION RATE: 16 BRPM | WEIGHT: 156.97 LBS | TEMPERATURE: 98 F | SYSTOLIC BLOOD PRESSURE: 118 MMHG | HEIGHT: 61 IN | OXYGEN SATURATION: 100 % | DIASTOLIC BLOOD PRESSURE: 74 MMHG | HEART RATE: 63 BPM

## 2019-08-29 DIAGNOSIS — E03.9 HYPOTHYROIDISM, UNSPECIFIED: ICD-10-CM

## 2019-08-29 DIAGNOSIS — Z01.818 ENCOUNTER FOR OTHER PREPROCEDURAL EXAMINATION: ICD-10-CM

## 2019-08-29 DIAGNOSIS — N18.6 END STAGE RENAL DISEASE: ICD-10-CM

## 2019-08-29 DIAGNOSIS — F20.9 SCHIZOPHRENIA, UNSPECIFIED: ICD-10-CM

## 2019-08-29 DIAGNOSIS — K08.409 PARTIAL LOSS OF TEETH, UNSPECIFIED CAUSE, UNSPECIFIED CLASS: Chronic | ICD-10-CM

## 2019-08-29 DIAGNOSIS — Z98.890 OTHER SPECIFIED POSTPROCEDURAL STATES: Chronic | ICD-10-CM

## 2019-08-29 LAB
ANION GAP SERPL CALC-SCNC: 9 MMOL/L — SIGNIFICANT CHANGE UP (ref 5–17)
APTT BLD: 35.7 SEC — SIGNIFICANT CHANGE UP (ref 27.5–36.3)
BLD GP AB SCN SERPL QL: SIGNIFICANT CHANGE UP
BUN SERPL-MCNC: 67 MG/DL — HIGH (ref 7–18)
CALCIUM SERPL-MCNC: 8.8 MG/DL — SIGNIFICANT CHANGE UP (ref 8.4–10.5)
CHLORIDE SERPL-SCNC: 99 MMOL/L — SIGNIFICANT CHANGE UP (ref 96–108)
CO2 SERPL-SCNC: 28 MMOL/L — SIGNIFICANT CHANGE UP (ref 22–31)
CREAT SERPL-MCNC: 5.81 MG/DL — HIGH (ref 0.5–1.3)
GLUCOSE SERPL-MCNC: 123 MG/DL — HIGH (ref 70–99)
HCT VFR BLD CALC: 36.4 % — SIGNIFICANT CHANGE UP (ref 34.5–45)
HGB BLD-MCNC: 11.4 G/DL — LOW (ref 11.5–15.5)
INR BLD: 0.94 RATIO — SIGNIFICANT CHANGE UP (ref 0.88–1.16)
MCHC RBC-ENTMCNC: 27.8 PG — SIGNIFICANT CHANGE UP (ref 27–34)
MCHC RBC-ENTMCNC: 31.3 GM/DL — LOW (ref 32–36)
MCV RBC AUTO: 88.8 FL — SIGNIFICANT CHANGE UP (ref 80–100)
NRBC # BLD: 0 /100 WBCS — SIGNIFICANT CHANGE UP (ref 0–0)
PLATELET # BLD AUTO: 201 K/UL — SIGNIFICANT CHANGE UP (ref 150–400)
POTASSIUM SERPL-MCNC: 3.9 MMOL/L — SIGNIFICANT CHANGE UP (ref 3.5–5.3)
POTASSIUM SERPL-SCNC: 3.9 MMOL/L — SIGNIFICANT CHANGE UP (ref 3.5–5.3)
PROTHROM AB SERPL-ACNC: 10.4 SEC — SIGNIFICANT CHANGE UP (ref 10–12.9)
RBC # BLD: 4.1 M/UL — SIGNIFICANT CHANGE UP (ref 3.8–5.2)
RBC # FLD: 15.9 % — HIGH (ref 10.3–14.5)
SODIUM SERPL-SCNC: 136 MMOL/L — SIGNIFICANT CHANGE UP (ref 135–145)
WBC # BLD: 5.2 K/UL — SIGNIFICANT CHANGE UP (ref 3.8–10.5)
WBC # FLD AUTO: 5.2 K/UL — SIGNIFICANT CHANGE UP (ref 3.8–10.5)

## 2019-08-29 PROCEDURE — 83036 HEMOGLOBIN GLYCOSYLATED A1C: CPT

## 2019-08-29 PROCEDURE — 85027 COMPLETE CBC AUTOMATED: CPT

## 2019-08-29 PROCEDURE — 36415 COLL VENOUS BLD VENIPUNCTURE: CPT

## 2019-08-29 PROCEDURE — 85730 THROMBOPLASTIN TIME PARTIAL: CPT

## 2019-08-29 PROCEDURE — 86901 BLOOD TYPING SEROLOGIC RH(D): CPT

## 2019-08-29 PROCEDURE — 86900 BLOOD TYPING SEROLOGIC ABO: CPT

## 2019-08-29 PROCEDURE — 87640 STAPH A DNA AMP PROBE: CPT

## 2019-08-29 PROCEDURE — 86850 RBC ANTIBODY SCREEN: CPT

## 2019-08-29 PROCEDURE — 80048 BASIC METABOLIC PNL TOTAL CA: CPT

## 2019-08-29 PROCEDURE — G0463: CPT

## 2019-08-29 PROCEDURE — 85610 PROTHROMBIN TIME: CPT

## 2019-08-29 RX ORDER — SODIUM CHLORIDE 9 MG/ML
3 INJECTION INTRAMUSCULAR; INTRAVENOUS; SUBCUTANEOUS EVERY 8 HOURS
Refills: 0 | Status: DISCONTINUED | OUTPATIENT
Start: 2019-09-03 | End: 2019-09-03

## 2019-08-29 NOTE — H&P PST ADULT - HISTORY OF PRESENT ILLNESS
53 year old Black female with multiple medical condition: bipolar disorder, hypothyroidism, HLD , IDDM ESRD on hemodialysis (M W F) has a  perma cath in the right subclavian. Pt's present upper arm existing fistula needs revision to be done on 9/3/2019. Pt was capable of providing history and supplemented additional information from hospital chart.

## 2019-08-29 NOTE — H&P PST ADULT - RS GEN PE MLT RESP DETAILS PC
breath sounds equal/normal/respirations non-labored/good air movement/airway patent/clear to auscultation bilaterally

## 2019-08-29 NOTE — H&P PST ADULT - VISION (WITH CORRECTIVE LENSES IF THE PATIENT USUALLY WEARS THEM):
cataract in right eye/Partially impaired: cannot see medication labels or newsprint, but can see obstacles in path, and the surrounding layout; can count fingers at arm's length

## 2019-08-29 NOTE — H&P PST ADULT - NSICDXPROBLEM_GEN_ALL_CORE_FT
PROBLEM DIAGNOSES  Problem: Schizophrenia  Assessment and Plan: pt given written instructions to take cogentin am of surgery    Problem: Hypothyroidism  Assessment and Plan: pt given written instructions to take synthyroid am of surgery    Problem: ESRD on dialysis  Assessment and Plan: schedule for left arm revision av fistula PROBLEM DIAGNOSES  Problem: Schizophrenia  Assessment and Plan: pt given written instructions to take cogentin am of surgery    Problem: Hypothyroidism  Assessment and Plan: pt given written instructions to take synthroid am of surgery    Problem: ESRD on dialysis  Assessment and Plan: schedule for left arm revision av fistula    Addendum  Problem: 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 Chlorhexidine clots and povidone ointment were also ordered

## 2019-08-29 NOTE — H&P PST ADULT - NSICDXPASTMEDICALHX_GEN_ALL_CORE_FT
PAST MEDICAL HISTORY:  Anemia     Bilateral dry eyes     Bipolar depression     Bradycardia asymptomatic- hx of    CAD (coronary artery disease)     Chronic GERD     DM (diabetes mellitus), type 2     End-stage renal disease (ESRD) HD on M- W- FR from 2/2019    ESRD on dialysis     HLD (hyperlipidemia)     Hypertension no on meds now    Hypothyroidism     Mental deficiency lower intelectual functioning    Mild obesity BMI- 32.2    Schizoaffective disorder     Schizophrenia

## 2019-08-29 NOTE — H&P PST ADULT - NSICDXPASTSURGICALHX_GEN_ALL_CORE_FT
PAST SURGICAL HISTORY:  History of vascular access device 2/2019 insertion to right neck, changer 2/2019 to left groin, changer to right chest 06/19    [place] teeth removed 4 years ago

## 2019-08-30 LAB
HBA1C BLD-MCNC: 6.3 % — HIGH (ref 4–5.6)
MRSA PCR RESULT.: DETECTED
S AUREUS DNA NOSE QL NAA+PROBE: DETECTED

## 2019-08-30 RX ORDER — POVIDONE-IODINE 5 %
1 AEROSOL (ML) TOPICAL ONCE
Refills: 0 | Status: COMPLETED | OUTPATIENT
Start: 2019-09-03 | End: 2019-09-03

## 2019-09-02 ENCOUNTER — TRANSCRIPTION ENCOUNTER (OUTPATIENT)
Age: 54
End: 2019-09-02

## 2019-09-03 ENCOUNTER — OUTPATIENT (OUTPATIENT)
Dept: OUTPATIENT SERVICES | Facility: HOSPITAL | Age: 54
LOS: 1 days | End: 2019-09-03
Payer: MEDICARE

## 2019-09-03 VITALS
HEIGHT: 61 IN | WEIGHT: 156.97 LBS | RESPIRATION RATE: 16 BRPM | SYSTOLIC BLOOD PRESSURE: 105 MMHG | OXYGEN SATURATION: 100 % | HEART RATE: 57 BPM | DIASTOLIC BLOOD PRESSURE: 56 MMHG | TEMPERATURE: 98 F

## 2019-09-03 VITALS
SYSTOLIC BLOOD PRESSURE: 103 MMHG | OXYGEN SATURATION: 99 % | HEART RATE: 65 BPM | RESPIRATION RATE: 20 BRPM | TEMPERATURE: 98 F | DIASTOLIC BLOOD PRESSURE: 64 MMHG

## 2019-09-03 DIAGNOSIS — K08.409 PARTIAL LOSS OF TEETH, UNSPECIFIED CAUSE, UNSPECIFIED CLASS: Chronic | ICD-10-CM

## 2019-09-03 DIAGNOSIS — N18.6 END STAGE RENAL DISEASE: ICD-10-CM

## 2019-09-03 DIAGNOSIS — Z22.322 CARRIER OR SUSPECTED CARRIER OF METHICILLIN RESISTANT STAPHYLOCOCCUS AUREUS: ICD-10-CM

## 2019-09-03 DIAGNOSIS — Z98.890 OTHER SPECIFIED POSTPROCEDURAL STATES: Chronic | ICD-10-CM

## 2019-09-03 LAB
ANION GAP SERPL CALC-SCNC: 7 MMOL/L — SIGNIFICANT CHANGE UP (ref 5–17)
BUN SERPL-MCNC: 53 MG/DL — HIGH (ref 7–18)
CALCIUM SERPL-MCNC: 9.1 MG/DL — SIGNIFICANT CHANGE UP (ref 8.4–10.5)
CHLORIDE SERPL-SCNC: 100 MMOL/L — SIGNIFICANT CHANGE UP (ref 96–108)
CO2 SERPL-SCNC: 30 MMOL/L — SIGNIFICANT CHANGE UP (ref 22–31)
CREAT SERPL-MCNC: 6.16 MG/DL — HIGH (ref 0.5–1.3)
GLUCOSE BLDC GLUCOMTR-MCNC: 74 MG/DL — SIGNIFICANT CHANGE UP (ref 70–99)
GLUCOSE BLDC GLUCOMTR-MCNC: 90 MG/DL — SIGNIFICANT CHANGE UP (ref 70–99)
GLUCOSE SERPL-MCNC: 108 MG/DL — HIGH (ref 70–99)
POTASSIUM SERPL-MCNC: 3.6 MMOL/L — SIGNIFICANT CHANGE UP (ref 3.5–5.3)
POTASSIUM SERPL-SCNC: 3.6 MMOL/L — SIGNIFICANT CHANGE UP (ref 3.5–5.3)
SODIUM SERPL-SCNC: 137 MMOL/L — SIGNIFICANT CHANGE UP (ref 135–145)

## 2019-09-03 PROCEDURE — 82962 GLUCOSE BLOOD TEST: CPT

## 2019-09-03 PROCEDURE — L8699: CPT

## 2019-09-03 PROCEDURE — C1889: CPT

## 2019-09-03 PROCEDURE — 15736 MUSCLE-SKIN GRAFT ARM: CPT | Mod: LT

## 2019-09-03 PROCEDURE — 36415 COLL VENOUS BLD VENIPUNCTURE: CPT

## 2019-09-03 PROCEDURE — 36832 AV FISTULA REVISION OPEN: CPT | Mod: LT

## 2019-09-03 PROCEDURE — 80048 BASIC METABOLIC PNL TOTAL CA: CPT

## 2019-09-03 RX ORDER — HEPARIN SODIUM 5000 [USP'U]/ML
2000 INJECTION INTRAVENOUS; SUBCUTANEOUS ONCE
Refills: 0 | Status: DISCONTINUED | OUTPATIENT
Start: 2019-09-03 | End: 2019-09-03

## 2019-09-03 RX ORDER — PALIPERIDONE 1.5 MG/1
1 TABLET, EXTENDED RELEASE ORAL
Qty: 0 | Refills: 0 | DISCHARGE

## 2019-09-03 RX ORDER — BENZTROPINE MESYLATE 1 MG
1 TABLET ORAL
Qty: 0 | Refills: 0 | DISCHARGE

## 2019-09-03 RX ORDER — CHLORHEXIDINE GLUCONATE 213 G/1000ML
1 SOLUTION TOPICAL ONCE
Refills: 0 | Status: COMPLETED | OUTPATIENT
Start: 2019-09-03 | End: 2019-09-03

## 2019-09-03 RX ORDER — VANCOMYCIN HCL 1 G
1000 VIAL (EA) INTRAVENOUS ONCE
Refills: 0 | Status: COMPLETED | OUTPATIENT
Start: 2019-09-03 | End: 2019-09-03

## 2019-09-03 RX ORDER — HEPARIN SODIUM 5000 [USP'U]/ML
1000 INJECTION INTRAVENOUS; SUBCUTANEOUS ONCE
Refills: 0 | Status: DISCONTINUED | OUTPATIENT
Start: 2019-09-03 | End: 2019-09-03

## 2019-09-03 RX ORDER — FENTANYL CITRATE 50 UG/ML
25 INJECTION INTRAVENOUS
Refills: 0 | Status: DISCONTINUED | OUTPATIENT
Start: 2019-09-03 | End: 2019-09-03

## 2019-09-03 RX ORDER — ENOXAPARIN SODIUM 100 MG/ML
15 INJECTION SUBCUTANEOUS
Qty: 0 | Refills: 0 | DISCHARGE

## 2019-09-03 RX ORDER — ONDANSETRON 8 MG/1
4 TABLET, FILM COATED ORAL ONCE
Refills: 0 | Status: DISCONTINUED | OUTPATIENT
Start: 2019-09-03 | End: 2019-09-03

## 2019-09-03 RX ORDER — ASPIRIN/CALCIUM CARB/MAGNESIUM 324 MG
1 TABLET ORAL
Qty: 0 | Refills: 0 | DISCHARGE

## 2019-09-03 RX ORDER — LEVOTHYROXINE SODIUM 125 MCG
1 TABLET ORAL
Qty: 0 | Refills: 0 | DISCHARGE

## 2019-09-03 RX ORDER — SIMVASTATIN 20 MG/1
1 TABLET, FILM COATED ORAL
Qty: 0 | Refills: 0 | DISCHARGE

## 2019-09-03 RX ADMIN — Medication 1 APPLICATION(S): at 12:27

## 2019-09-03 RX ADMIN — CHLORHEXIDINE GLUCONATE 1 APPLICATION(S): 213 SOLUTION TOPICAL at 12:26

## 2019-09-03 RX ADMIN — Medication 250 MILLIGRAM(S): at 12:27

## 2019-09-03 RX ADMIN — SODIUM CHLORIDE 3 MILLILITER(S): 9 INJECTION INTRAMUSCULAR; INTRAVENOUS; SUBCUTANEOUS at 12:27

## 2019-09-03 NOTE — BRIEF OPERATIVE NOTE - OPERATION/FINDINGS
Left upper extremity AV fistula 2 mo postop with palpable thrill but too deep for cannulation. Dissection down to the arterialized vein, creation of small subcutaneous flap and secure of fistula in more superficial position. Closure with vicryl and monocryl running subcuticular.

## 2019-09-03 NOTE — ASU DISCHARGE PLAN (ADULT/PEDIATRIC) - BATHING
shower only 2 weeks, keep dressing on for 48 hrs Sponge only/shower only 2 weeks, keep dressing on for 48 hrs

## 2019-09-03 NOTE — CHART NOTE - NSCHARTNOTEFT_GEN_A_CORE
Called to draw blood from specimen was hemolyzed and patient was refusing further peripheral blood draws.    Patient noted to have Right chest permacath. Under sterile conditions, blood drawn from permacath which had good flow.    Permacath line was flushed and 1.66cc of heparin was placed in permacath tubing.     Patient tolerated procedure.

## 2019-09-03 NOTE — ASU DISCHARGE PLAN (ADULT/PEDIATRIC) - CALL YOUR DOCTOR IF YOU HAVE ANY OF THE FOLLOWING:
Bleeding that does not stop/Inability to tolerate liquids or foods/Swelling that gets worse/Wound/Surgical Site with redness, or foul smelling discharge or pus/Numbness, tingling, color or temperature change to extremity/Pain not relieved by Medications

## 2019-09-03 NOTE — ASU PATIENT PROFILE, ADULT - PSH
History of vascular access device  2/2019 insertion to right neck, changer 2/2019 to left groin, changer to right chest 06/19  [place] teeth removed  4 years ago

## 2019-09-03 NOTE — BRIEF OPERATIVE NOTE - NSICDXBRIEFPROCEDURE_GEN_ALL_CORE_FT
PROCEDURES:  Revision or removal, dialysis AV fistula or graft, upper extremity 03-Sep-2019 17:48:37  Trell Mccloud

## 2019-09-03 NOTE — ASU DISCHARGE PLAN (ADULT/PEDIATRIC) - CARE PROVIDER_API CALL
Kristopher Monsivais (MD)  Surgery; Thoracic Surgery; Vascular Surgery  [address] [facility], [facility] [address]  [place] [address]  Phone: [phone]  Fax: [phone]  Follow Up Time:

## 2019-09-03 NOTE — ASU PATIENT PROFILE, ADULT - SURGICAL SITE INCISION
wellbutrin       Last Written Prescription Date: 1/4/17  Last Fill Quantity: 30; # refills: 0  Last Office Visit with [facility], [facility] or  Health prescribing provider:  8/18/16        Last PHQ-9 score on record=   PHQ-9 SCORE 8/18/2016   Total Score -   Total Score 10       AST       10   1/25/2016  ALT       24   1/25/2016    
no

## 2019-09-03 NOTE — ASU PATIENT PROFILE, ADULT - PMH
Anemia    Bilateral dry eyes    Bipolar depression    Bradycardia  asymptomatic- hx of  CAD (coronary artery disease)    Chronic GERD    DM (diabetes mellitus), type 2    End-stage renal disease (ESRD)  HD on M- W- FR from 2/2019  ESRD on dialysis    HLD (hyperlipidemia)    Hypertension  no on meds now  Hypothyroidism    Mental deficiency  lower intelectual functioning  Mild obesity  BMI- 32.2  Schizoaffective disorder    Schizophrenia

## 2019-09-03 NOTE — ASU PATIENT PROFILE, ADULT - VISION (WITH CORRECTIVE LENSES IF THE PATIENT USUALLY WEARS THEM):
Partially impaired: cannot see medication labels or newsprint, but can see obstacles in path, and the surrounding layout; can count fingers at arm's length/cataract in right eye

## 2020-02-21 ENCOUNTER — EMERGENCY (EMERGENCY)
Facility: HOSPITAL | Age: 55
LOS: 1 days | Discharge: HOME | End: 2020-02-21
Attending: EMERGENCY MEDICINE
Payer: MEDICARE

## 2020-02-21 VITALS
DIASTOLIC BLOOD PRESSURE: 75 MMHG | SYSTOLIC BLOOD PRESSURE: 126 MMHG | RESPIRATION RATE: 18 BRPM | OXYGEN SATURATION: 97 % | HEART RATE: 63 BPM | TEMPERATURE: 98 F

## 2020-02-21 VITALS
RESPIRATION RATE: 20 BRPM | HEART RATE: 64 BPM | WEIGHT: 173.06 LBS | OXYGEN SATURATION: 94 % | DIASTOLIC BLOOD PRESSURE: 75 MMHG | HEIGHT: 59 IN | TEMPERATURE: 98 F | SYSTOLIC BLOOD PRESSURE: 120 MMHG

## 2020-02-21 DIAGNOSIS — K08.409 PARTIAL LOSS OF TEETH, UNSPECIFIED CAUSE, UNSPECIFIED CLASS: Chronic | ICD-10-CM

## 2020-02-21 DIAGNOSIS — Z98.890 OTHER SPECIFIED POSTPROCEDURAL STATES: Chronic | ICD-10-CM

## 2020-02-21 PROCEDURE — 99282 EMERGENCY DEPT VISIT SF MDM: CPT

## 2020-02-21 PROCEDURE — 99285 EMERGENCY DEPT VISIT HI MDM: CPT

## 2020-02-21 NOTE — ED PROVIDER NOTE - OBJECTIVE STATEMENT
53 yo F brought by ambulance for evaluation of aggressive behavior after dialysis. patient understands she was brought to the ED for agitation. she describes being upset about people trying to enter the restroom while she was in the bathroom. patient reports screaming at people at the dialysis center as well as slamming the door. she denies that she threw a fire extinguisher. she states the fire extinguisher fell over when she slammed the door. currently she has no complaints.

## 2020-02-21 NOTE — ED ADULT NURSE NOTE - CHIEF COMPLAINT QUOTE
pt is nick from [facility] for " throwing fire extinguisher ", pt states " I was using the bathroom and someone kept walking in and out , so I got upset ". pt denies any HI or SI

## 2020-02-21 NOTE — ED PROVIDER NOTE - PATIENT PORTAL LINK FT
You can access the FollowMyHealth Patient Portal offered by [facility] by registering at the following website: [email or web address]. By joining [facility]’s FollowMyHealth portal, you will also be able to view your health information using other applications (apps) compatible with our system.

## 2020-02-21 NOTE — ED ADULT TRIAGE NOTE - CHIEF COMPLAINT QUOTE
pt is nick from [facility] for " throwing fire extinguisher ", pt states " I was using the bathroom and someone kept walking in and out , so I got upset ". pt denies any HI or SI pt is nick from [facility] for " throwing fire extinguisher ", pt states " I was using the bathroom and someone kept walking in and out , so I got upset ". pt denies any HI or SI, pt resides at [facility] pt is nick from [facility] for " throwing fire extinguisher ", pt states " I was using the bathroom and someone kept walking in and out , so I got upset ". pt denies any HI or SI, pt resides at [facility]

## 2020-02-21 NOTE — ED ADULT NURSE NOTE - CAS EDP DISCH TYPE
Assisted living facility/[facility]

## 2020-02-21 NOTE — ED ADULT NURSE NOTE - OBJECTIVE STATEMENT
Float note - received pt from Dialysis center for agitation  axox3  ,nad , at present 1:1 , calm , cooperative , R eye blind .

## 2020-02-21 NOTE — ED PROVIDER NOTE - CLINICAL SUMMARY MEDICAL DECISION MAKING FREE TEXT BOX
no evidence of acute psychosis. patient understands she overreacted at the dialysis. she is appropriate for discharge back home.

## 2020-02-22 PROBLEM — N18.6 END STAGE RENAL DISEASE: Chronic | Status: ACTIVE | Noted: 2019-08-29

## 2020-02-24 NOTE — ED ADULT TRIAGE NOTE - ACCOMPANIED BY
Fax from pharmacy requests new prescription for lisinopril (PRINIVIL,ZESTRIL) 40 MG tablet. Prescription originated with a different provider.   Fax in box of Dr. Lelia Bonilla, response requested, thanks
Received refill request for Lisinopril  Last office visit: 2/19/2020  Last filled 11/19/19 #30 x0 RF  Last /70    According to the [facility] refill policy, Sanjeev Summers's Prescription refill request routed to PCP for review.
EMT/paramedic

## 2020-03-15 NOTE — PHARMACOTHERAPY INTERVENTION NOTE - COMMENTS
54y/o F with PMH of Schizophrenia receiving Benztropine 0.5mg bid; The recommendation is to reevaluate the need if any EPS/Parkinsonism as currently no evident indication and dosing is lower than usual; Was mentioned in MED Rec, might not be properly reconciled (as per MED Rec was on Invega (Paliperidone) at home; as antipsychotic was not initiated for inpatient stay the recommendation is to reevaluate EPS ppx/tx need and d/c Benztropine if none)
54y/o F with PMH of Schizophrenia was as per MED Rec on Invega (Paliperidone) at home; as antipsychotic was not initiated for inpatient stay the recommendation was to reevaluate the need, consult psych and start for hospital stay if no contraindications
Yes

## 2021-03-18 NOTE — PROGRESS NOTE ADULT - ASSESSMENT
Speech Pediatric Re-Evaluation  Today's date: 3/18/2021  Patient name: Jeff Strauss   : 2016  Age: 2 y o  MRN Number: [id number]              Subjective Comments: Pt always arrives on time accompanied by her mom  Pt is able to enter the session il'y  Current / Prior Services being received: Speech  Family considered Early Intervention services from 35-3 years old but did not pursue IU services in the school setting  Education: Home with mom and dad during the day  Patient has very limited interaction with any children her age  She was enrolled with the IU program through her county; however, Mom has decided not to send her to in person due to COVID-19 pandem    Assessments:Speech/Language  Language Scales Fifth Edition (PLS-5)    19:  PLS-5 testing scores started on 19 and completed 19  Multiple sessions needed to complete testing due to poor attention to task at the time  Auditory comprehension raw score 19, standard score 66, 1st percentile and 1:3 age equivalence  Expressive communication 22 raw score, 77 standard score, 6th percentile and 1:5 age equivalence  20:   Matthew Anderson received an auditory comprehension standard score of  59 which places her at the 1st percentile for her age  This score indicates that Matthew Anderson does not fall within the typical range for her age and gender  Matthew Anderson received an expressive communication standard score of 77 which places her at the 6th percentile for her age  This score indicates that Matthew Anderson does not fall within the typical range for his/her age and gender  20: The  Language Scales Fifth Edition (PLS-5) is an individually administered test, appropriate for use with children from birth to 7 years 11 months    This tests principle use is to determine if a child has; a language delay or disorder, a receptive and/or expressive language delay/disorder, eligibility for early intervention or speech and language services, identify expressive and receptive language skills in the areas of; attention, gesture, play, vocal development, social communication, vocabulary, concepts, language structure, integrative language, and emergent literacy, identify strengths and weaknesses for appropriate intervention, and measure efficacy of speech and language treatment  The  Language Scales Fifth Edition (PLS-5) was administered to Deshawn on 8/18/20  Deshawn received an auditory comprehension standard score of 67 which places her at the 1st percentile for her age  This score indicates that Deshawn does not fall within the typical range for her age and gender  The auditory comprehension subtest test measures the childs attention skills, gestural comprehension, play (i e ; functional, relational, self-directed play, & symbolic play), vocabulary, concepts (i e; spatial, quantitative, & qualitative), and language structure (i e; verbs, pronouns, modified nouns, & prefixes), integrative language (inferences, predictions, & multistep directions), and emergent literacy (i e; book handling, concept of word, & print awareness)  Deficits in this area would be classified as a delay in responding to stimuli or language and/or a deficit in interpreting the intended communication of others  Deshawn received an expressive communication standard score of 85 which places her at the 16th percentile for his/her age  This score indicates that Deshawn does not fall within the typical range for her age and gender      The expressive communication subtest measures the childs vocal development, social communication (i e ; facial expressions, joint attention, & eye contact), play (i e ; symbolic & cooperative play), vocabulary, concepts (i e ; quantitative, qualitative, & temporal), language structure (i e; sentences, synonyms, irregular plurals, & A/P:  1. DEISY /ESRD: pt has chronic Tubulo-interstitial ds on renal biopsy of unclear etiology(biopsy results discussed with pt)  -pt will need long term chronic dialysis  -pt will need avf/avg as per vascular surgery  -pt is being dialysed now.Hd orders written and discussed with dialysis RN  -we will plan for hd again tomorrow as pts cr is very high and pt needs more dialysis  -Keep patient euvolemic and renal diet  -Avoid Nephrotoxic Meds/ Agents such as (NSAIDs, IV contrast, Aminoglycosides such as gentamicin, -Gadolinium contrast, Phosphate containing enemas, etc..)  -Adjust Medications according to eGFR  -d/w pt for blood test   to moniter her k and Hb and pt agree to have blood test now  2. 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  -continue  phoslo 1334 tid with meals and low phos diet  - pt has acceptable pth level-no intervention needed at this time  -F/U CA AND PHOS LEVEL   6.JUSTO-NEPHRIC HEMATOMA: SMALL POST RENAL BIOPSY, seen by urology  -no intervention needed   7.WEAKNESS: r/o secondary to Vitamin deficiency like Vit d, folic acid and B12  -d/w resident to f/u   8.HYPONATRMEIA: worsening pre dialysis ,etiology unclear ,dought hypervolemic ?, was dialysed today but unable to take fluid as pts bp was low  -add iv saline for possible hypovolemic as pt is hypotensive for 10 hrs   -f/u Na level daily modifying nouns), and integrative language (i e ; retelling stories & answering hypothetical questions)  Deficits in this area would be classified as a delay in oral language production and/or deficits in intelligibility in expressive language skills needed for communicating wants and needs  Summary/Impressions:   Results of the PLS-5 indicate Amanda Westfall exhibits a language delay/disorder  Based on formal observation, Amanda Westfall presents with a (mild, moderate, severe) delay in auditory comprehension characterized by (deficits determined from Item Analysis Checklist) and a (mild, moderate, severe) delay in expressive communication characterized by (deficits determined from Item Analysis Checklist)    Language Scales, 5th Edition    The  Language Scales Fifth Edition (PLS-5) is an individually administered test, appropriate for use with children from birth to 7 years 11 months  This tests principle use is to determine if a child has; a language delay or disorder, a receptive and/or expressive language delay/disorder, eligibility for early intervention or speech and language services, identify expressive and receptive language skills in the areas of; attention, gesture, play, vocal development, social communication, vocabulary, concepts, language structure, integrative language, and emergent literacy, identify strengths and weaknesses for appropriate intervention, and measure efficacy of speech and language treatment  The  Language Scales Fifth Edition (PLS-5) was administered to Amanda Westfall on 2/25/2021  Amanda Westfall received an auditory comprehension standard score of 76 which places her at the 5th percentile for her age  This score indicates that Amanda Westfall does not fall within the typical range for her age and gender       The auditory comprehension subtest test measures the childs attention skills, gestural comprehension, play (i e ; functional, relational, self-directed play, & symbolic play), vocabulary, concepts (i e; spatial, quantitative, & qualitative), and language structure (i e; verbs, pronouns, modified nouns, & prefixes), integrative language (inferences, predictions, & multistep directions), and emergent literacy (i e; book handling, concept of word, & print awareness)  Deficits in this area would be classified as a delay in responding to stimuli or language and/or a deficit in interpreting the intended communication of others  Berna Bernal received an expressive communication standard score of 82 which places her at the 12th percentile for her age  This score indicates that Berna Bernal does not fall within the typical range for her age and gender  The expressive communication subtest measures the childs vocal development, social communication (i e ; facial expressions, joint attention, & eye contact), play (i e ; symbolic & cooperative play), vocabulary, concepts (i e ; quantitative, qualitative, & temporal), language structure (i e; sentences, synonyms, irregular plurals, & modifying nouns), and integrative language (i e ; retelling stories & answering hypothetical questions)  Deficits in this area would be classified as a delay in oral language production and/or deficits in intelligibility in expressive language skills needed for communicating wants and needs  Berna Bernal received a Total Language standard score of 78 which places her at the 7 percentile for her age  Current Short Term Goals  1  Pt will complete PLS-5 tested  Goal Met    2  Complete oral motor examination  Goal not met due to pt not allowing clinician observation    3  Pt will increase vocabulary to 10 words brandt or imitated in session  Pt is able to il'y produce 1-3 word phrases throughout play and activities  Pt is observed having difficulty responding to questions and using grammatically correct sentences  She often exhibits echolalia   GOAL MET    4  Pt will ID objects and pictures in a F2 @ 80% mod cue  Pt had much difficulty with this goal across several sessions  Therapist changed language from  "show me" to "give me the" and a significant increase was noted (I e  40% to 100%)  GOAL MET      5  Pt will follow 1 step commands with gesture 80% brandt  Liam Nuñez is able to follow one step directions with a gesture approximately 60-75% of the time  We will continue to work on following directions throughout sessions and update this goal to include spatial directions as well  CONTINUE GOAL     6  Pt will respond to name consistently in session  Liam Nuñez has demonstrated a significant increase in responding to her name and overall joint attention  GOAL MET     New or Updated Short Term Goals  1  Pt will follow 1 step spatial directions @ 80% brandt  2  Pt answer wh questions (what have, what doing) @ 80% acc  Il'y  3  Pt will imitate 2-4 word sentences using grammatically correct language @ 80% acc  Il'y  4  Pt will use regular plurals (/s/, /z/, -es) @ 80% acc  il'y         Impressions/ Recommendations  Riddhi is a 2year, 9 month old female who attends speech and language therapy one time per week for 30 minutes each session  Pt has improved with her attention to activities and auditory comprehension skills as noted in weekly sessions and in testing  Liam Nuñez continues to have difficulty with using grammatically correct sentences and answering questions which greatly effects her ability to carryout conversations with communication partners  Pt is benefiting from speech and occupational therapy co-treatment sessions in which the OT is able to meet her sensory needs while playing in joint activities  Riddhi frequently exhibits echolalia which we will work to decrease through independent language growth in comments and answering questions   It is recommended that Riddhi attends speech and language therapy 1-2 times per week for 30-45 minutes each session as a co-treatment with Occupational Therapy         Recommendations:Speech/ language therapy  Frequency:1-2x weekly  Duration:Other 6 months    Intervention certification PGQD:3/62/9786  Intervention certification HOFF:2/09/0797    Visit: 6/30 A/P:  1. DEISY /ESRD: pt has chronic Tubulo-interstitial ds on renal biopsy of unclear etiology(biopsy results discussed with pt)  -pt will need long term chronic dialysis  -pt will need avf/avg as per vascular surgery as outpatient   -pt will be dialsyed later today.Hd orders written and discussed with dialysis RN  -we will plan for d/c to adult home tomorrow and start outpatient hd at [facility] on Friday  -Keep patient euvolemic and renal diet  -Avoid Nephrotoxic Meds/ Agents such as (NSAIDs, IV contrast, Aminoglycosides such as gentamicin, -Gadolinium contrast, Phosphate containing enemas, etc..)  -Adjust Medications according to eGFR  2. 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

## 2021-11-19 NOTE — H&P PST ADULT - MUSCULOSKELETAL
[FreeTextEntry1] : 18yr old female pt here with coughing, chest pain, and rhinorrhea. \par Symptoms likely due to viral URI. Recommend supportive care including antipyretics, fluids, and nasal saline. Meds admin. \par COVID PCR lab sent \par COVID handout given and quarantine discussed while waiting for results.  \par \par father (He) [phone] contacted and made aware of visit.  Father coming to school to  pt. \par See PMD or urgent care for any new or worsening symptoms.  \par  negative detailed exam no calf tenderness no joint erythema/no calf tenderness/no joint swelling/no joint warmth/normal strength details…

## 2022-03-04 NOTE — ED PROVIDER NOTE - NS_EDPROVIDERDISPOUSERTYPE_ED_A_ED
Left message to call back       Scribe Attestation (For Scribes USE Only)... Scribe Attestation (For Scribes USE Only).../Attending Attestation (For Attendings USE Only)...

## 2022-05-06 NOTE — CONSULT NOTE ADULT - MINUTES
05/05/22 2305   Patient Observation   Pulse 82   Resp 19   SpO2 97 %   Observations ETT SIZE 7.5, SECURED AT 25 LIP LINE. RIGHT. AMBU BAG AT HEAD OF BED. WATER GOOD. Breath Sounds   Right Upper Lobe Rhonchi   Right Middle Lobe Diminished   Right Lower Lobe Diminished   Left Upper Lobe Rhonchi   Left Lower Lobe Diminished   Airway Clearance   Suction ET Tube   Suction Device Inline suction catheter   Sputum Method Obtained Endotracheal   Sputum Amount Small   Sputum Consistency Thick   Vent Information   Vent Mode AC/VC   Vent Settings   FiO2  45 %   Resp Rate (Set) 18 bmp   Vt (Set, mL) 490 mL   PEEP/CPAP (cmH2O) 8   Vent Patient Data (Readings)   Vt Exhaled 658 mL   Rate Measured 23 br/min   Minute Volume 11.2 Liters   Peak Flow 50 L/min   Pressure Support 0 cmH20   I:E Ratio 1:2.10   Sensitivity 3   Peak Inspiratory Pressure 30 cmH2O   Mean Airway Pressure 18 cmH20   High Peep/I Pressure 0   I Time/ I Time % 0 s   Plateau Pressure (cm H2O) 26 cm H2O   Vent Alarm Settings   High Pressure  45 cmH2O   Low Minute Volume Alarm 4 L/min   RR High 40 br/min   Apnea (Secs) 20 secs   ETT (adult)   Placement Date/Time: 04/22/22 0945   Present on Admission/Arrival: No  Placed By: Licensed provider  Placement Verified By: Chest X-ray  Preoxygenation: Yes  Mask Ventilation: Mask ventilation not attempted (0)  Technique: Rapid sequence;Direct laryng. ..    Secured At 25 cm   Measured From Lips   ETT Placement Right   Secured By Commercial tube simon   Site Assessment Dry 30

## 2022-06-10 NOTE — H&P PST ADULT - OTHER CARE PROVIDERS
pcp Cheilitis Normal Treatment: I recommended application of Vaseline or Aquaphor numerous times a day (as often as every hour) and before going to bed.

## 2022-06-13 NOTE — CHART NOTE - NSCHARTNOTEFT_GEN_A_CORE
Patient tolerated Keytruda/Taxol/Carbo well today. ANC up to 2.2, no need to add neupogen to treatment plan at this time per MD. NAD noted upon discharge. Verbalized understanding to call MD for any questions/concerns. PICC line + blood return present, both lumens, flushed and hep locked. Discharged home, ambulated independently.    pt ppd is negative for any induration after 48 hours.

## 2022-09-16 NOTE — PROGRESS NOTE ADULT - SUBJECTIVE AND OBJECTIVE BOX
PGY 1 Note discussed with supervising resident and primary attending    Patient is a 53y old  Female who presents with a chief complaint of vomiting (31 Jan 2019 15:12)      INTERVAL HPI/OVERNIGHT EVENTS:   Patient seen and examined at the bedside.  scheduled for perma cath exchange today, IR: busy so might not happen today   Chest pain; EKG: no new changes, CE: negative     MEDICATIONS  (STANDING):  amLODIPine   Tablet 5 milliGRAM(s) Oral daily  benztropine 0.5 milliGRAM(s) Oral two times a day  calcium acetate 667 milliGRAM(s) Oral three times a day with meals  dextrose 50% Injectable 12.5 Gram(s) IV Push once  dextrose 50% Injectable 25 Gram(s) IV Push once  dextrose 50% Injectable 25 Gram(s) IV Push once  epoetin seema Injectable 6000 Unit(s) SubCutaneous <User Schedule>  fluticasone propionate 50 MICROgram(s)/spray Nasal Spray 1 Spray(s) Both Nostrils two times a day  heparin  Injectable 5000 Unit(s) SubCutaneous every 8 hours  insulin glargine Injectable (LANTUS) 5 Unit(s) SubCutaneous at bedtime  insulin lispro (HumaLOG) corrective regimen sliding scale   SubCutaneous Before meals and at bedtime  insulin lispro Injectable (HumaLOG) 2 Unit(s) SubCutaneous three times a day before meals  levothyroxine 25 MICROGram(s) Oral daily  simvastatin 20 milliGRAM(s) Oral at bedtime    MEDICATIONS  (PRN):  dextrose 40% Gel 15 Gram(s) Oral once PRN Blood Glucose LESS THAN 70 milliGRAM(s)/deciLiter  glucagon  Injectable 1 milliGRAM(s) IntraMuscular once PRN Glucose <70 milliGRAM(s)/deciLiter      __________________________________________________  REVIEW OF SYSTEMS:    CONSTITUTIONAL: No fever,   EYES: no acute visual disturbances  NECK: No pain or stiffness  RESPIRATORY: No cough; No shortness of breath  CARDIOVASCULAR: No chest pain, no palpitations  GASTROINTESTINAL: No pain. No nausea or vomiting; No diarrhea   NEUROLOGICAL: No headache or numbness, no tremors  MUSCULOSKELETAL: No joint pain, no muscle pain  GENITOURINARY: no dysuria, no frequency, no hesitancy  PSYCHIATRY: no depression , no anxiety  ALL OTHER  ROS negative        Vital Signs Last 24 Hrs  T(C): 36.8 (01 Feb 2019 15:58), Max: 37.3 (01 Feb 2019 08:13)  T(F): 98.3 (01 Feb 2019 15:58), Max: 99.2 (01 Feb 2019 08:13)  HR: 81 (01 Feb 2019 15:58) (60 - 82)  BP: 132/60 (01 Feb 2019 15:58) (124/56 - 152/66)  BP(mean): --  RR: 18 (01 Feb 2019 15:58) (16 - 18)  SpO2: 100% (01 Feb 2019 15:58) (98% - 100%)    ________________________________________________  PHYSICAL EXAM:  GENERAL: NAD  HEENT: Normocephalic;  conjunctivae and sclerae clear; moist mucous membranes;   NECK : supple  CHEST/LUNG: Clear to auscultation bilaterally with good air entry   HEART: S1 S2  regular; no murmurs, gallops or rubs  ABDOMEN: Soft, Nontender, Nondistended; Bowel sounds present  EXTREMITIES: no cyanosis; no edema; no calf tenderness  SKIN: warm and dry; no rash  NERVOUS SYSTEM:  Awake and alert; Oriented  to place, person and time ; no new deficits    _________________________________________________  LABS:                        9.9    8.2   )-----------( 218      ( 31 Jan 2019 08:28 )             31.6     01-31    133<L>  |  100  |  81<H>  ----------------------------<  202<H>  4.0   |  20<L>  |  8.56<H>    Ca    9.5      31 Jan 2019 08:28  Phos  5.4     01-31  Mg     2.3     01-31    TPro  8.2  /  Alb  3.8  /  TBili  0.4  /  DBili  x   /  AST  11  /  ALT  44  /  AlkPhos  196<H>  01-31        CAPILLARY BLOOD GLUCOSE      POCT Blood Glucose.: 210 mg/dL (01 Feb 2019 11:54)  POCT Blood Glucose.: 231 mg/dL (01 Feb 2019 08:25)  POCT Blood Glucose.: 325 mg/dL (31 Jan 2019 21:11)  POCT Blood Glucose.: 289 mg/dL (31 Jan 2019 16:37)        RADIOLOGY & ADDITIONAL TESTS:    Imaging Personally Reviewed:  YES/    Consultant(s) Notes Reviewed:   YES/    Care Discussed with Consultants :     Plan of care was discussed with patient and /or primary care giver; all questions and concerns were addressed and care was aligned with patient's wishes. no

## 2022-12-17 NOTE — PACU DISCHARGE NOTE - NAUSEA/VOMITING:
Problem: Discharge Planning  Goal: Discharge to home or other facility with appropriate resources  12/17/2022 1536 by Nancy Clark RN  Outcome: Progressing     Problem: Skin/Tissue Integrity  Goal: Absence of new skin breakdown  Description: 1. Monitor for areas of redness and/or skin breakdown  2. Assess vascular access sites hourly  3. Every 4-6 hours minimum:  Change oxygen saturation probe site  4. Every 4-6 hours:  If on nasal continuous positive airway pressure, respiratory therapy assess nares and determine need for appliance change or resting period.   Outcome: Progressing     Problem: Pain  Goal: Verbalizes/displays adequate comfort level or baseline comfort level  12/17/2022 1536 by Nancy Clark RN  Outcome: Progressing None

## 2022-12-29 NOTE — PATIENT PROFILE ADULT - HAVE YOU EXPERIENCED A TRAUMATIC EVENT?
{PROVIDER CHARTING PREFERENCE:997305}    Subjective   Gabriel is a 48 year old{ACCOMPANIED BY STATEMENT (Optional):868357}, presenting for the following health issues:  No chief complaint on file.      HPI     {SUPERLIST (Optional):735743}  {additonal problems for provider to add (Optional):011799}    Review of Systems   {ROS COMP (Optional):763880}      Objective    There were no vitals taken for this visit.  There is no height or weight on file to calculate BMI.  Physical Exam   {Exam List (Optional):915811}    {Diagnostic Test Results (Optional):994548}    {AMBULATORY ATTESTATION (Optional):260317}             no

## 2023-08-15 NOTE — PROGRESS NOTE ADULT - SUBJECTIVE AND OBJECTIVE BOX
Resident Note discussed with  primary attending    Patient is a 53y old  Female who presents with a chief complaint of vomiting (24 Jan 2019 16:58)      INTERVAL HPI/ OVERNIGHT EVENTS: no new complaints, planning for dialysis as per Nephro, spoke to IR about Permacath. Discussed with patient and sister about permacath placement and dialysis.     MEDICATIONS  (STANDING):  amLODIPine   Tablet 5 milliGRAM(s) Oral daily  aspirin enteric coated 81 milliGRAM(s) Oral daily  benztropine 0.5 milliGRAM(s) Oral two times a day  calcium acetate 667 milliGRAM(s) Oral three times a day with meals  dextrose 5%. 1000 milliLiter(s) (50 mL/Hr) IV Continuous <Continuous>  dextrose 50% Injectable 12.5 Gram(s) IV Push once  dextrose 50% Injectable 25 Gram(s) IV Push once  dextrose 50% Injectable 25 Gram(s) IV Push once  epoetin seema Injectable 6000 Unit(s) SubCutaneous <User Schedule>  fluticasone propionate 50 MICROgram(s)/spray Nasal Spray 1 Spray(s) Both Nostrils two times a day  heparin  Injectable 5000 Unit(s) SubCutaneous every 8 hours  insulin lispro (HumaLOG) corrective regimen sliding scale   SubCutaneous Before meals and at bedtime  iron sucrose IVPB 100 milliGRAM(s) IV Intermittent every 24 hours  levothyroxine 25 MICROGram(s) Oral daily  simvastatin 20 milliGRAM(s) Oral at bedtime  sodium chloride 0.9%. 1000 milliLiter(s) (100 mL/Hr) IV Continuous <Continuous>    MEDICATIONS  (PRN):  dextrose 40% Gel 15 Gram(s) Oral once PRN Blood Glucose LESS THAN 70 milliGRAM(s)/deciLiter  glucagon  Injectable 1 milliGRAM(s) IntraMuscular once PRN Glucose <70 milliGRAM(s)/deciLiter      __________________________________________________  REVIEW OF SYSTEMS:    CONSTITUTIONAL: No fever,   EYES: no acute visual disturbances  NECK: No pain or stiffness  RESPIRATORY: No cough; No shortness of breath  CARDIOVASCULAR: No chest pain, no palpitations  GASTROINTESTINAL: No pain. No nausea or vomiting; No diarrhea   NEUROLOGICAL: No headache or numbness, no tremors  MUSCULOSKELETAL: No joint pain, no muscle pain  GENITOURINARY: no dysuria, no frequency, no hesitancy  PSYCHIATRY: no depression , no anxiety  ALL OTHER  ROS negative        Vital Signs Last 24 Hrs  T(C): 37 (25 Jan 2019 08:10), Max: 37.4 (25 Jan 2019 05:07)  T(F): 98.6 (25 Jan 2019 08:10), Max: 99.3 (25 Jan 2019 05:07)  HR: 64 (25 Jan 2019 08:10) (59 - 66)  BP: 134/54 (25 Jan 2019 08:10) (119/46 - 134/54)  BP(mean): --  RR: 20 (25 Jan 2019 08:10) (18 - 20)  SpO2: 100% (25 Jan 2019 08:10) (100% - 100%)    ________________________________________________  PHYSICAL EXAM:  GENERAL: NAD  HEENT: Normocephalic; conjunctivae and sclerae clear; moist mucous membranes;   NECK : supple  CHEST/LUNG: Clear to auscultation bilaterally with good air entry   HEART: S1 S2  regular; no murmurs, gallops or rubs  ABDOMEN: Soft, Nontender, Nondistended; Bowel sounds present  EXTREMITIES: no cyanosis; no edema; no calf tenderness  NERVOUS SYSTEM:  Awake and alert; Oriented  to place, person and time ; no new deficits    _________________________________________________  LABS:                        7.2    7.6   )-----------( 227      ( 25 Jan 2019 07:09 )             23.2     01-25    138  |  111<H>  |  84<H>  ----------------------------<  114<H>  4.5   |  18<L>  |  7.91<H>    Ca    8.5      25 Jan 2019 07:09  Phos  5.0     01-25  Mg     2.0     01-25    TPro  7.1  /  Alb  3.2<L>  /  TBili  0.3  /  DBili  x   /  AST  31  /  ALT  51  /  AlkPhos  160<H>  01-25    PT/INR - ( 25 Jan 2019 07:09 )   PT: 10.6 sec;   INR: 0.96 ratio             CAPILLARY BLOOD GLUCOSE      POCT Blood Glucose.: 131 mg/dL (25 Jan 2019 08:36)  POCT Blood Glucose.: 283 mg/dL (24 Jan 2019 21:54)  POCT Blood Glucose.: 224 mg/dL (24 Jan 2019 17:03)  POCT Blood Glucose.: 289 mg/dL (24 Jan 2019 11:52)        Consultant(s) Notes Reviewed:   YES    Care Discussed with Consultants : YES    Plan of care was discussed with patient and /or primary care giver; all questions and concerns were addressed and care was aligned with patient's wishes. [FreeTextEntry1] : Patient is 20 year old male presenting for wound to the right hallux s/p wound debridement and bone biopsy x 2 , and PICC line. Patient has been applying sterile dressing to the wound as advised and has been ambulating in surgical shoe. Patient is accompanied by his mother. Patient's mother states she would like to discuss regarding surgical intervention and that to continue with conservative treatment at this time. Patient's mother states that she would have to travel to [place] and would like to be present for any decision making and if need to decide for surgical intervention.

## 2023-08-29 NOTE — H&P ADULT - PROBLEM SELECTOR PROBLEM 7
Detail Level: Zone Continue Regimen: Hydroxyzine QHS Render In Strict Bullet Format?: No Hypothyroid

## 2023-09-22 NOTE — H&P PST ADULT - NS PRO AD PATIENT TYPE
"[facility] Care Coordination Contact    09/23/2023--Care coordinator returned call to member.  Angus called earlier in day requesting call back as he needed assistance completing his EW/MA renewal paperwork.  Care coordinator gave member the phone number for [facility] Keep Your Coverage Team [phone]    Babita Mason RN  [facility]  [phone]    09/23/2023--Member called back stating he had called [facility] and they \"were going to call you.\"  He was not able to explain why they were calling.  As of end of day, no call from [facility] had been received.    Babita Mason RN  [facility]  [phone]      " Health Care Proxy (HCP)

## 2025-04-09 NOTE — H&P PST ADULT - SPO2 (%)
FAMILY HISTORY:  Sibling  Still living? Unknown  Family history of diabetes mellitus, Age at diagnosis: Age Unknown    
89

## 2025-06-17 NOTE — ED ADULT TRIAGE NOTE - NS_BH TRG QUESTION6_ED_ALL_ED
Spoke with horace and she is aware a new RX was sent to [facility] and she will contact the office if she has any problems with that.   No